# Patient Record
Sex: MALE | Race: WHITE | HISPANIC OR LATINO | URBAN - METROPOLITAN AREA
[De-identification: names, ages, dates, MRNs, and addresses within clinical notes are randomized per-mention and may not be internally consistent; named-entity substitution may affect disease eponyms.]

---

## 2023-06-10 ENCOUNTER — OFFICE VISIT (OUTPATIENT)
Dept: FAMILY MEDICINE | Facility: CLINIC | Age: 18
End: 2023-06-10
Payer: MEDICAID

## 2023-06-10 VITALS
HEART RATE: 88 BPM | RESPIRATION RATE: 16 BRPM | OXYGEN SATURATION: 98 % | TEMPERATURE: 99.8 F | DIASTOLIC BLOOD PRESSURE: 82 MMHG | SYSTOLIC BLOOD PRESSURE: 140 MMHG

## 2023-06-10 DIAGNOSIS — Z90.5 HISTORY OF NEPHRECTOMY: Primary | ICD-10-CM

## 2023-06-10 DIAGNOSIS — R21 RASH OF FACE: ICD-10-CM

## 2023-06-10 LAB
ALBUMIN SERPL BCG-MCNC: 4.5 G/DL (ref 3.5–5.2)
ALP SERPL-CCNC: 83 U/L (ref 40–129)
ALT SERPL W P-5'-P-CCNC: 22 U/L (ref 10–50)
ANION GAP SERPL CALCULATED.3IONS-SCNC: 10 MMOL/L (ref 7–15)
AST SERPL W P-5'-P-CCNC: 28 U/L (ref 10–50)
BASOPHILS # BLD AUTO: 0 10E3/UL (ref 0–0.2)
BASOPHILS NFR BLD AUTO: 0 %
BILIRUB SERPL-MCNC: 0.2 MG/DL
BUN SERPL-MCNC: 13.4 MG/DL (ref 6–20)
CALCIUM SERPL-MCNC: 9.9 MG/DL (ref 8.6–10)
CHLORIDE SERPL-SCNC: 105 MMOL/L (ref 98–107)
CREAT SERPL-MCNC: 0.9 MG/DL (ref 0.67–1.17)
DEPRECATED HCO3 PLAS-SCNC: 27 MMOL/L (ref 22–29)
EOSINOPHIL # BLD AUTO: 0.1 10E3/UL (ref 0–0.7)
EOSINOPHIL NFR BLD AUTO: 1 %
ERYTHROCYTE [DISTWIDTH] IN BLOOD BY AUTOMATED COUNT: 12.1 % (ref 10–15)
GFR SERPL CREATININE-BSD FRML MDRD: >90 ML/MIN/1.73M2
GLUCOSE SERPL-MCNC: 89 MG/DL (ref 70–99)
HCT VFR BLD AUTO: 45.5 % (ref 40–53)
HGB BLD-MCNC: 15.4 G/DL (ref 13.3–17.7)
IMM GRANULOCYTES # BLD: 0 10E3/UL
IMM GRANULOCYTES NFR BLD: 0 %
LYMPHOCYTES # BLD AUTO: 2.4 10E3/UL (ref 0.8–5.3)
LYMPHOCYTES NFR BLD AUTO: 20 %
MCH RBC QN AUTO: 27.9 PG (ref 26.5–33)
MCHC RBC AUTO-ENTMCNC: 33.8 G/DL (ref 31.5–36.5)
MCV RBC AUTO: 83 FL (ref 78–100)
MONOCYTES # BLD AUTO: 1 10E3/UL (ref 0–1.3)
MONOCYTES NFR BLD AUTO: 8 %
NEUTROPHILS # BLD AUTO: 8.7 10E3/UL (ref 1.6–8.3)
NEUTROPHILS NFR BLD AUTO: 71 %
PLATELET # BLD AUTO: 434 10E3/UL (ref 150–450)
POTASSIUM SERPL-SCNC: 4.6 MMOL/L (ref 3.4–5.3)
PROT SERPL-MCNC: 8.1 G/DL (ref 6.4–8.3)
RBC # BLD AUTO: 5.51 10E6/UL (ref 4.4–5.9)
SODIUM SERPL-SCNC: 142 MMOL/L (ref 136–145)
WBC # BLD AUTO: 12.2 10E3/UL (ref 4–11)

## 2023-06-10 PROCEDURE — 99203 OFFICE O/P NEW LOW 30 MIN: CPT | Performed by: FAMILY MEDICINE

## 2023-06-10 PROCEDURE — 36415 COLL VENOUS BLD VENIPUNCTURE: CPT | Performed by: FAMILY MEDICINE

## 2023-06-10 PROCEDURE — 80053 COMPREHEN METABOLIC PANEL: CPT | Performed by: FAMILY MEDICINE

## 2023-06-10 PROCEDURE — 85025 COMPLETE CBC W/AUTO DIFF WBC: CPT | Performed by: FAMILY MEDICINE

## 2023-06-10 RX ORDER — DOXYCYCLINE 50 MG/1
50 CAPSULE ORAL 2 TIMES DAILY
Qty: 28 CAPSULE | Refills: 0 | Status: SHIPPED | OUTPATIENT
Start: 2023-06-10 | End: 2023-07-09

## 2023-06-10 RX ORDER — DOXYCYCLINE 50 MG/1
50 CAPSULE ORAL 2 TIMES DAILY
Qty: 28 CAPSULE | Refills: 0 | Status: SHIPPED | OUTPATIENT
Start: 2023-06-10 | End: 2023-06-10

## 2023-06-10 NOTE — PROGRESS NOTES
Assessment & Plan     Rash of face  Contact/irritant dermatitis vs eczema vs periorificial dermatitis. Favor periorificial dermatitis. Dermatology referral placed. While waiting for appt, recommend trial of treatment. 'Zero therapy' discussed. Stop using 1% hydrocortisone. Start metronidazole. I suspect he will not tolerate topical therapy, since the skin is so inflamed and burns with lotion application, so he was also prescribed doxycycline.   - Adult Dermatology Referral  - metroNIDAZOLE (METROCREAM) 0.75 % external cream  Dispense: 45 g; Refill: 0  - doxycycline monohydrate (MONODOX) 50 MG capsule  Dispense: 28 capsule; Refill: 0    History of nephrectomy  Elevated BP  New to [place], just got insurance coverage. Baseline labs drawn today, as he has not seen a provider in years. Recheck of BP improved but still borderline. Recommend he establish care asap w PCP.  WBC mildly elevated, but appears well with no signs/symptoms of infection. Consider recheck when he established care w PCP. CMP pending at completion of visit, will notify of results when available.   - Comprehensive metabolic panel (BMP + Alb, Alk Phos, ALT, AST, Total. Bili, TP)  - CBC with platelets and differential                 No follow-ups on file.    Vivian Gonzalez MD  [facility]   Quan is a 17 year old, presenting for the following health issues:  Facial Swelling (Started 3 months ago when he moved to [place] from [place]. Left eye is worse. Left eye has been swollen shut. Ice pack and warm compresses helps relieve at time. Right eye does not get as bad. Is bothering vision. Using lotions. on left arm there is a spot that has been there same amount of time and has noticed around lips. Pain and itching and constant burning.  COntstant stuffy nose, )         View : No data to display.              HPI   Rash, dry skin around eyes, lips x 3 mos. Burns. Skin around eyes gets puffy. Does not recall anything  specific that started it    Has tried 4 types of OTC lotions for sensitive skin, everything burns/stings, makes it worse  Has tried 1% hydrocortisone    Inflammation comes and goes, can get so severe that eye swells shut    When eyes swell, lashes invert and scratch eye    Tries not to rub, but cannot help it sometimes    PMH: Had renal obstruction, kidney was removed about 5 years ago. Previously on lisinopril for HTN, but stopped after nephrectomy, states he no longer needed it. Has not seen a kidney doctor in at least a few years. No PCP.     Just got insurance coverage, and is getting set up w nephrologist here              Review of Systems         Objective    BP (!) 140/82   Pulse 88   Temp 99.8  F (37.7  C) (Tympanic)   Resp 16   SpO2 98%   There is no height or weight on file to calculate BMI.  Physical Exam   General: Pleasant well-appearing no acute distress  Skin: skin around eyes is erythematous, lichenified, and slightly swollen. Skin around lips is erythematous, with scattered tiny erythematous papules. On left inner upper arm, lichenified erythematous plaque approx 2-3cm, well demarcated

## 2023-06-11 NOTE — PATIENT INSTRUCTIONS
Schedule follow up wit a primary care doctor for your BP, kidney, and general health    Schedule with dermatology (skin specialist) for your rash.  In the meantime you can start doxycycline 50 mg twice daily, I have prescribed 2 weeks worth.  You should get a call from dermatology within 2 business days to schedule.  In the meantime, do not use any products other than plain vaseline

## 2023-06-12 ENCOUNTER — TELEPHONE (OUTPATIENT)
Dept: OTHER | Age: 18
End: 2023-06-12

## 2023-06-12 NOTE — TELEPHONE ENCOUNTER
This encounter is being sent to inform the clinic that this patient has a referral from Vivian Gonzalez MD for the diagnoses of Rash of face and has requested that this patient be seen within Priority: 1-2 Weeks. Based on the availability of our provider(s), we are unable to accommodate this request.      Were all sites offered this patient?  Yes      Does scheduling algorithm request to schedule next available?  Patient has been scheduled for the first available opening with Jake Lundberg MD on 12/21/2023.  We have informed the patient that the clinic will review their referral and reach out if a sooner appointment is medically necessary.     Verbal consent from patient that can speak with sister/Kelly if he is unavailable.

## 2023-06-15 ENCOUNTER — OFFICE VISIT (OUTPATIENT)
Dept: DERMATOLOGY | Facility: CLINIC | Age: 18
End: 2023-06-15
Attending: FAMILY MEDICINE
Payer: MEDICAID

## 2023-06-15 DIAGNOSIS — R21 RASH OF FACE: ICD-10-CM

## 2023-06-15 DIAGNOSIS — H01.119 CONTACT DERMATITIS OF EYELID, UNSPECIFIED LATERALITY: Primary | ICD-10-CM

## 2023-06-15 PROCEDURE — 99204 OFFICE O/P NEW MOD 45 MIN: CPT | Mod: GC | Performed by: DERMATOLOGY

## 2023-06-15 RX ORDER — DESONIDE 0.5 MG/G
OINTMENT TOPICAL 2 TIMES DAILY
Qty: 60 G | Refills: 1 | Status: SHIPPED | OUTPATIENT
Start: 2023-06-15 | End: 2023-09-26

## 2023-06-15 RX ORDER — TACROLIMUS 1 MG/G
OINTMENT TOPICAL 2 TIMES DAILY
Qty: 60 G | Refills: 1 | Status: SHIPPED | OUTPATIENT
Start: 2023-06-15 | End: 2023-09-26

## 2023-06-15 ASSESSMENT — PAIN SCALES - GENERAL: PAINLEVEL: MODERATE PAIN (4)

## 2023-06-15 NOTE — LETTER
6/15/2023       RE: Quan Davidson  [address]  [place] [address]     Dear Colleague,    Thank you for referring your patient, Quan Davidson, to the [facility] DERMAT[facility] [place] [facility]. Please see a copy of my visit note below.    [facility] Dermatology Note  Encounter Date: Shiva 15, 2023  Office Visit     Dermatology Problem List:  # Eyelid dermatitis, suspected ACD  - desonide ointment, protopic ointment, gentle skin cares  - allergy referral placed 06/15/23     ____________________________________________    Assessment & Plan:   # Eyelid dermatitis, suspect ACD  # Mild flexural eczema, keratosis pilaris, and rhinitis likely atopic predisposition   Physical exam as below likely consistent with a allergic contact dermatitis given eyelid involvement.  Likely has a background  of atopic dermatitis given other concurrent findings and symptoms.  Etiology natural history of this condition was discussed with the patient as well as treatment options and the rationale for use.  We will start topical therapies including desonide ointment twice daily for the next 2 weeks.  Side effects of steroids discussed including risk of glaucoma and cataracts with prolonged use.  We will then transition to topical tacrolimus thereafter with repeat use of desonide as needed.  Additionally allergy referral was placed for further evaluation and work-up.   - desonide (DESOWEN) 0.05 % external ointment; Apply topically 2 times daily  Dispense: 60 g; Refill: 1  - tacrolimus (PROTOPIC) 0.1 % external ointment; Apply topically 2 times daily  Dispense: 60 g; Refill: 1  - stop doxycycline, stop metronidazole  - Adult Dermatology Referral; Future    Procedures Performed:   None    Follow-up: 2 months, prn for new or changing lesions    Staff and Resident: Shakila Perez MD  PGY-3 Dermatology  Pager: [phone]    I have  personally examined this patient and agree with the resident doctor's documentation and plan of care. I have reviewed and amended the resident's note above. The documentation accurately reflects my clinical observations, diagnoses, treatment and follow-up plans.     Lashawn Jhaveri MD  Dermatology Staff  ____________________________________________    CC: Rash (Patient reports rash on face causes severe swelling. Patient states rash on L arm also swells.)    HPI:  Mr. Quan Davidson is a(n) 17 year old male who presents today as a new patient for rash.    Patient reports approximately 3 months ago he started having intermittent rash around his eyes to the point that they would swell and close chart.  Has happened approximately 4-5 times and severe episodes.  Does note a little bit of runny nose.  Has sometimes has a rash on his left flexural elbow.  Recently moved here from [place].  Wonders if related to sunlight as worsens with sunlight exposure.  Tries to wear sunscreen on a regular basis.  Currently unemployed.  Washes hands with Dove soap.  Uses [employer] Spring soap for body wash and dress and may for shampoo.  Was prescribed doxycycline as well as metronidazole cream for suspected periorificial dermatitis however is not using those as did not find any layer of them helpful.  Currently just using Carmex topically.  No other concerns at this time  - Patient is otherwise feeling well, without additional skin concerns.    Labs Reviewed:  N/A    Physical Exam:  Vitals: There were no vitals taken for this visit.  SKIN: Waist-up skin, which includes the head/face, neck, both arms, chest, back, abdomen, digits and/or nails was examined.  -Lichenified and eczematous papules and plaques on the upper and lower eyelids extending onto the cheeks.  Increase hyper linearity of the lower eyelid folds.  Few eczematous papules and plaques on the left flexural elbow with a larger nummular lichenified hyperpigmented plaque.   Tiny hard  papules on the posterior arms.   No other lesions of concern on areas examined.             Medications:  Current Outpatient Medications   Medication    doxycycline monohydrate (MONODOX) 50 MG capsule    metroNIDAZOLE (METROCREAM) 0.75 % external cream     No current facility-administered medications for this visit.      Past Medical History:   There is no problem list on file for this patient.    No past medical history on file.    CC Vivian Gonzalez MD  [address] [place] [address] on close of this encounter.    Rub eyes  3 months  405 times  Worsens with the sun  Not using metrondaziole  Eczema on the arms   Hx of sensitive skin to sun  - no eczema  - no seasonal allergies  - no asthma  Little brother with eczema  - shower day  - shampoo uses tressme  - soap, [place] spring  No rhass elsewhere     Thinks related to the sun    V

## 2023-06-15 NOTE — NURSING NOTE
Dermatology Rooming Note    Quan Davidson's goals for this visit include:   Chief Complaint   Patient presents with     Rash     Patient reports rash on face causes severe swelling. Patient states rash on L arm also swells.     Adilene Lopez

## 2023-06-15 NOTE — PROGRESS NOTES
Rub eyes  3 months  405 times  Worsens with the sun  Not using metrondaziole  Eczema on the arms   Hx of sensitive skin to sun  - no eczema  - no seasonal allergies  - no asthma  Little brother with eczema  - shower day  - shampoo uses tressme  - soap, mar[employer] spring  No rhass elsewhere     Thinks related to the sun    V

## 2023-06-15 NOTE — PATIENT INSTRUCTIONS
- Start desonide ointment twice a day for 2 weeks on the eyelids and face  - Then start topical tacrolimus twice a day as needed  - Allergy referral placed        Gentle Skin Care    Below is a list of products our providers recommend for gentle skin care.  Moisturizers:  Lighter; Exederm Intensive Moisture Cream, Cetaphil Cream, CeraVe, Aveeno Positively radiant and Vanicream Light   Thicker; Aquaphor Ointment, Vaseline, Petroleum Jelly, Eucerin Original Healing Cream and Vanicream, CeraVe Healing Ointment, Aquaphor Body Spray  Avoid Lotions (too thin)  Mild Cleansers:  Dove- Fragrance Free bar or wash  CeraVe   Vanicream Cleansing bar  Cetaphil Cleanser   Aquaphor 2 in1 Gentle Wash and Shampoo  Dove Baby wash  Exederm Body wash       Laundry Products:    All Free and Clear  Cheer Free  Generic Brands are okay as long as they are  Fragrance Free    Avoid fabric softeners  and dryer sheets   Sunscreens: SPF 30 or greater     Sunscreens that contain Zinc Oxide and/or Titanium Dioxide should be applied, these are physical blockers. One or both of these should be listed in the  Active Ingredients   Any other listed ingredients under the active ingredients would be a chemically based sunscreen which might be irritating.  Spray sunscreens should be avoided because these are typically chemical sunscreens.      Shampoo and Conditioners:  Free and Clear by Vanicream  Aquaphor 2 in 1 Gentle Wash and Shampoo   Oils:  Mineral Oil   Emu Oil   For some patients: Coconut (raw, unrefined, organic) and Sunflower seed oil              Generic Products are an okay substitute, but make sure they are fragrance free.  *Reading the product ingredients list is very important  *Avoid product that have fragrance added to them.   *Organic does not mean  fragrance free.  In fact patients with sensitive skin can become quite irritated by some organic products.     Daily bathing is recommended. Make sure you are applying a good moisturizer  after bathing every time.  Use Moisturizing creams at least twice daily to the whole body. Your provider may recommend a lighter or heavier moisturizer based on your child s severity and that time of year it is.  Creams are more moisturizing than lotions.       Care Plan:  Keep bathing and showering short, less than 15 minutes   Always use lukewarm warm when possible. AVOID HOT or COLD water  DO NOT use bubble bath  Limit the use of soaps. Focus on the skin folds, face, armpits, groin and feet towards the end of the bath  Do NOT vigorously scrub when you cleanse the skin  After bathing, PAT your skin lightly with a towel. DO NOT rub or scrub when drying  ALWAYS apply a moisturizer immediately after bathing. This helps to  lock in  the moisture. * IF YOU WERE PRESCRIBED A TOPICAL MEDICATION, APPLY YOUR MEDICATION FIRST THEN COVER WITH YOUR DAILY MOISTURIZER  Reapply moisturizing agents at least twice daily to your whole body    Other helpful tips:  Do not use products such as powders, perfumes, or colognes on your skin  Diffusers can be harsh on sensitive skin, use with caution if you or your child has sensitive skin   Avoid saunas and steam baths. This temperature is too HOT  Avoid tight or  scratchy  clothing such as wool  Always wash new clothing before wearing them for the first time  Sometimes a humidifier or vaporizer can be used at night can help the dry skin. Remember to keep these items clean to avoid mold growth.

## 2023-06-15 NOTE — PROGRESS NOTES
[facility] Dermatology Note  Encounter Date: Shiva 15, 2023  Office Visit     Dermatology Problem List:  # Eyelid dermatitis, suspected ACD  - desonide ointment, protopic ointment, gentle skin cares  - allergy referral placed 06/15/23     ____________________________________________    Assessment & Plan:   # Eyelid dermatitis, suspect ACD  # Mild flexural eczema, keratosis pilaris, and rhinitis likely atopic predisposition   Physical exam as below likely consistent with a allergic contact dermatitis given eyelid involvement.  Likely has a background  of atopic dermatitis given other concurrent findings and symptoms.  Etiology natural history of this condition was discussed with the patient as well as treatment options and the rationale for use.  We will start topical therapies including desonide ointment twice daily for the next 2 weeks.  Side effects of steroids discussed including risk of glaucoma and cataracts with prolonged use.  We will then transition to topical tacrolimus thereafter with repeat use of desonide as needed.  Additionally allergy referral was placed for further evaluation and work-up.   - desonide (DESOWEN) 0.05 % external ointment; Apply topically 2 times daily  Dispense: 60 g; Refill: 1  - tacrolimus (PROTOPIC) 0.1 % external ointment; Apply topically 2 times daily  Dispense: 60 g; Refill: 1  - stop doxycycline, stop metronidazole  - Adult Dermatology Referral; Future    Procedures Performed:   None    Follow-up: 2 months, prn for new or changing lesions    Staff and Resident: Shakila Perez MD  PGY-3 Dermatology  Pager: [phone]    I have personally examined this patient and agree with the resident doctor's documentation and plan of care. I have reviewed and amended the resident's note above. The documentation accurately reflects my clinical observations, diagnoses, treatment and follow-up plans.     Lashawn Jhaveri MD  Dermatology  Staff  ____________________________________________    CC: Rash (Patient reports rash on face causes severe swelling. Patient states rash on L arm also swells.)    HPI:  Mr. Quan Davidson is a(n) 17 year old male who presents today as a new patient for rash.    Patient reports approximately 3 months ago he started having intermittent rash around his eyes to the point that they would swell and close chart.  Has happened approximately 4-5 times and severe episodes.  Does note a little bit of runny nose.  Has sometimes has a rash on his left flexural elbow.  Recently moved here from [place].  Wonders if related to sunlight as worsens with sunlight exposure.  Tries to wear sunscreen on a regular basis.  Currently unemployed.  Washes hands with Dove soap.  Uses [employer] Spring soap for body wash and dress and may for shampoo.  Was prescribed doxycycline as well as metronidazole cream for suspected periorificial dermatitis however is not using those as did not find any layer of them helpful.  Currently just using Carmex topically.  No other concerns at this time  - Patient is otherwise feeling well, without additional skin concerns.    Labs Reviewed:  N/A    Physical Exam:  Vitals: There were no vitals taken for this visit.  SKIN: Waist-up skin, which includes the head/face, neck, both arms, chest, back, abdomen, digits and/or nails was examined.  -Lichenified and eczematous papules and plaques on the upper and lower eyelids extending onto the cheeks.  Increase hyper linearity of the lower eyelid folds.  Few eczematous papules and plaques on the left flexural elbow with a larger nummular lichenified hyperpigmented plaque.  Tiny hard  papules on the posterior arms.   No other lesions of concern on areas examined.             Medications:  Current Outpatient Medications   Medication     doxycycline monohydrate (MONODOX) 50 MG capsule     metroNIDAZOLE (METROCREAM) 0.75 % external cream     No current  facility-administered medications for this visit.      Past Medical History:   There is no problem list on file for this patient.    No past medical history on file.    CC Vivian Gonzalez MD  [address] [place] [address] on close of this encounter.

## 2023-06-26 ENCOUNTER — OFFICE VISIT (OUTPATIENT)
Dept: FAMILY MEDICINE | Facility: CLINIC | Age: 18
End: 2023-06-26
Payer: MEDICAID

## 2023-06-26 VITALS
HEIGHT: 63 IN | DIASTOLIC BLOOD PRESSURE: 96 MMHG | HEART RATE: 102 BPM | OXYGEN SATURATION: 98 % | BODY MASS INDEX: 55.81 KG/M2 | RESPIRATION RATE: 14 BRPM | TEMPERATURE: 98.6 F | WEIGHT: 315 LBS | SYSTOLIC BLOOD PRESSURE: 146 MMHG

## 2023-06-26 DIAGNOSIS — M25.561 CHRONIC PAIN OF RIGHT KNEE: Primary | ICD-10-CM

## 2023-06-26 DIAGNOSIS — H53.9 VISION CHANGES: ICD-10-CM

## 2023-06-26 DIAGNOSIS — G89.29 CHRONIC PAIN OF RIGHT KNEE: Primary | ICD-10-CM

## 2023-06-26 DIAGNOSIS — E66.01 MORBID OBESITY (H): ICD-10-CM

## 2023-06-26 DIAGNOSIS — Z90.5 S/P NEPHRECTOMY: ICD-10-CM

## 2023-06-26 PROCEDURE — 99214 OFFICE O/P EST MOD 30 MIN: CPT | Performed by: FAMILY MEDICINE

## 2023-06-26 RX ORDER — DOXYCYCLINE 50 MG/1
1 TABLET ORAL
COMMUNITY
Start: 2023-06-10 | End: 2023-09-26

## 2023-06-26 NOTE — PATIENT INSTRUCTIONS
Give me the day to write an exercise prescription for you!  It was nice to meet you. Meet with the orthopedic specialists for the knee pain.  Meet with the weight center for the weight management.  We will repeat a blood pressure after this, but meet with the kidney doctors.

## 2023-06-26 NOTE — PROGRESS NOTES
"  Assessment & Plan     Chronic pain of right knee  Chronic, stable. Patient would like to follow up with orthopedic surgeon. Referral placed.   - Orthopedic  Referral    Vision changes  Chronic, likely age related myopia. Referral for optometrist.   - REVIEW OF HEALTH MAINTENANCE PROTOCOL ORDERS  - Adult Eye  Referral  - Primary Care - Care Coordination Referral    S/p nephrectomy  Chronic, stable. Denies any hematuria. BP elevated today. Would want to be cautious given solitary kidney. Referral placed.   - Adult Nephrology  Referral    Morbid obesity (H)  Chronic, patient would like to consider medication versus surgery.   - Adult Comprehensive Weight Management  Referral      Ordering of each unique test  I spent a total of 29 minutes on the day of the visit.   Time spent by me doing chart review, history and exam, documentation and further activities per the note       BMI:   Estimated body mass index is 57.71 kg/m  as calculated from the following:    Height as of this encounter: 1.607 m (5' 3.25\").    Weight as of this encounter: 149 kg (328 lb 6.4 oz).   Weight management plan: Patient referred to endocrine and/or weight management specialty Discussed healthy diet and exercise guidelines    See Patient Instructions    DO EUGENIO Gilliam [facility] AMIRA Glass is a 18 year old, presenting for the following health issues:  Establish Care        6/26/2023    10:32 AM   Additional Questions   Roomed by Courtney WILLIS CMA   Accompanied by sister     History of Present Illness       Reason for visit:  To start primary care    He eats 0-1 servings of fruits and vegetables daily.He consumes 0 sweetened beverage(s) daily.He exercises with enough effort to increase his heart rate 10 to 19 minutes per day.  He exercises with enough effort to increase his heart rate 4 days per week.   He is taking medications regularly.     Patient would like a new primary. " "He is s/p nephrectomy due to a congenital kidney pathology?    Knee Injury  -Traumatic fall to the knee when in flexion  -Patient experiences knee giving out frequently  -Patient is seeing a chiropractor    Patient has a target weight of 170-180 lb  Lowest weight was 199 lb  Patient used to be on lisinopril.     Review of Systems   Constitutional, HEENT, cardiovascular, pulmonary, gi and gu systems are negative, except as otherwise noted.      Objective    BP (!) 144/82 (BP Location: Left arm, Patient Position: Sitting, Cuff Size: Adult Large)   Pulse 102   Temp 98.6  F (37  C) (Oral)   Resp 14   Ht 1.607 m (5' 3.25\")   Wt 149 kg (328 lb 6.4 oz)   SpO2 98%   BMI 57.71 kg/m    Body mass index is 57.71 kg/m .  Physical Exam   GENERAL: healthy, alert and no distress  EYES: Eyes grossly normal to inspection, PERRL and conjunctivae and sclerae normal  NECK: no adenopathy, no asymmetry, masses, or scars and thyroid normal to palpation  MS: no gross musculoskeletal defects noted, no edema  SKIN: no suspicious lesions or rashes  PSYCH: mentation appears normal, affect normal/bright                "

## 2023-06-26 NOTE — LETTER
June 26, 2023      Quan FLORA Davidson  [address]  [place] [address]        {Comm Man dear:[id number]}          Sincerely,        Nidhi Louis, DO

## 2023-06-27 ENCOUNTER — TELEPHONE (OUTPATIENT)
Dept: NEPHROLOGY | Facility: CLINIC | Age: 18
End: 2023-06-27

## 2023-06-27 ENCOUNTER — PATIENT OUTREACH (OUTPATIENT)
Dept: CARE COORDINATION | Facility: CLINIC | Age: 18
End: 2023-06-27

## 2023-06-27 NOTE — PROGRESS NOTES
Clinic Care Coordination Contact  Community Health Worker Initial Outreach    CHW Initial Information Gathering:  Referral Source: PCP  Preferred Urgent Care: [facility] [place], [phone]  Current living arrangement:: I live in a private home with family  Type of residence:: Private home - stairs  Community Resources: [facility]  Informal Support system:: Family, Friends, Parent  No PCP office visit in Past Year: No  Transportation means:: Family, Friend, Regular car  CHW Additional Questions  If ED/Hospital discharge, follow-up appointment scheduled as recommended?: N/A  Medication changes made following ED/Hospital discharge?: N/A  MyChart active?: Yes  Patient sent Social Determinants of Health questionnaire?: Yes    Patient accepts CC: Yes. Patient scheduled for assessment with CC RN  on Wednesday 6/28/2023 at 3:00pm. Patient noted desire to discuss recent CC referral- patient requesting to establish care with new providers.     Order Questions    Question Answer   Reason for Referral: Other   My Clinical Question Is: Resource support- looking for establishing care with all new providers   Clinical Staff have discussed the Care Coordination Referral with the patient and/or caregiver: Yes         Siri EVANS  Community Health Worker  [facility] Care Coordination  [facility] [email or web address]  Office: [phone]

## 2023-06-27 NOTE — TELEPHONE ENCOUNTER
Labs will be placed closer to appointment  
[facility] Call Center    Phone Message    May a detailed message be left on voicemail: yes     Reason for Call: Order(s): Other:   Reason for requested: Labs  Date needed: 8/30/23  Provider name: Stacey      Action Taken: Other: Neph    Travel Screening: Not Applicable                                                                      
abd

## 2023-06-28 ENCOUNTER — PATIENT OUTREACH (OUTPATIENT)
Dept: NURSING | Facility: CLINIC | Age: 18
End: 2023-06-28

## 2023-06-28 ASSESSMENT — ACTIVITIES OF DAILY LIVING (ADL): DEPENDENT_IADLS:: INDEPENDENT
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
## 2023-06-28 NOTE — CONFIDENTIAL NOTE
DIAGNOSIS:  S/p nephrectomy   DATE RECEIVED: 09.06.2023   NOTES STATUS DETAILS   OFFICE NOTE from referring provider Internal 06/28/2023 Nidhi Louis, DO   OFFICE NOTE from other specialist  Internal 06.10.2023 Vivian Gonzalez MD   *Only VASCULITIS or LUPUS gather office notes for the following     *PULMONARY       *ENT     *DERMATOLOGY     *RHEUMATOLOGY     DISCHARGE SUMMARY from hospital     DISCHARGE REPORT from the ER     MEDICATION LIST Internal    IMAGING  (NEED IMAGES AND REPORTS)     KIDNEY CT SCAN     KIDNEY ULTRASOUND     MR ABDOMEN     NUCLEAR MEDICINE RENAL     LABS     CBC Internal 06.10.2023   CMP Internal 06.10.2023   BMP     UA     URINE PROTEIN     RENAL PANEL     BIOPSY     KIDNEY BIOPSY

## 2023-06-28 NOTE — LETTER
[facility] CARE COORDINATION  [facility]    July 9, 2023    Quan HINES Joey Mendezra  [address]  [place] [address]      Dear Quan,      I am a  clinic care coordinator who works with Nidhi Louis DO with the [facility]. I wanted to thank you for spending the time to talk with me.  Below is a description of clinic care coordination and how I can further assist you.       The clinic care coordination team is made up of a registered nurse, , financial resource worker and community health worker who understand the health care system. The goal of clinic care coordination is to help you manage your health and improve access to the health care system. Our team works alongside your provider to assist you in determining your health and social needs. We can help you obtain health care and community resources, providing you with necessary information and education. We can work with you through any barriers and develop a care plan that helps coordinate and strengthen the communication between you and your care team.  Our services are voluntary and are offered without charge to you personally.    Please feel free to contact me with any questions or concerns regarding care coordination and what we can offer.      We are focused on providing you with the highest-quality healthcare experience possible.    Sincerely,     David Myhre, RN  CCC RN    Enclosed: I have enclosed a copy of the Patient Centered Plan of Care. This has helpful information and goals that we have talked about. Please keep this in an easy to access place to use as needed.

## 2023-06-28 NOTE — LETTER
[facility]  Patient Centered Plan of Care  About Me:        Patient Name:  Quan Davidson    YOB: 2005  Age:         18 year old   Thai MRN:    [id number] Telephone Information:  Home Phone [phone]   Mobile [phone]   Home Phone [phone]   Home Phone [phone]       Address:  [address] [place] [address] Email address:  [email or web address]      Emergency Contact(s)    Name Relationship Lgl Grd Work Phone Home Phone Mobile Phone   1. JASON JACKSON Father   [phone]    2. CAROLYN JACKSON Mother   [phone]    3. SEPIDEH JACKSON Father   [phone]    4. WILMAR JOHNS Sister    [phone]           Primary language:  English     needed? No   [employer]:  [phone] op. 1  Other communication barriers:None    Preferred Method of Communication:     Current living arrangement: I live in a private home with family    Mobility Status/ Medical Equipment: Independent        Health Maintenance  Health Maintenance Reviewed: Due/Overdue   Health Maintenance Due   Topic Date Due    YEARLY PREVENTIVE VISIT  Never done    ADVANCE CARE PLANNING  Never done    HEPATITIS B IMMUNIZATION (1 of 3 - 3-dose series) Never done    COVID-19 Vaccine (1) Never done    DTAP/TDAP/TD IMMUNIZATION (1 - Tdap) Never done    HPV IMMUNIZATION (1 - Risk male 3-dose series) Never done    HIV SCREENING  Never done    HEPATITIS C SCREENING  Never done          My Access Plan  Medical Emergency 911   Primary Clinic Line  - [phone]   24 Hour Appointment Line [phone] or  [phone]) (toll-free)   24 Hour Nurse Line [phone] (toll-free)   Preferred Urgent Care [facility] [phone]     [facility]  [phone]     Preferred Pharmacy [facility] [address] [facility] [address] [place] [address]     Behavioral Health Crisis Line The National Suicide Prevention  [facility] at 1-775.663.6848 or Text/Call 988             My Care Team Members  Patient Care Team         Relationship Specialty Notifications Start End    Nidhi Louis DO PCP - General Family Medicine  7/9/23     Phone: [phone] Fax: [phone]         [address] [facility] [address]    Edyta Hawkins, NP Nurse Practitioner Nurse Practitioner  3/3/15     ref to nephro    Phone: [phone] Fax: [phone]         [facility] [address] [address]    Piil Hernandez MD MD Pediatric Nephrology  3/3/15     Phone: [phone] Fax: [phone]         [address] [address]    Edyta Hawkins NP  Nurse Practitioner  6/10/23     Merged    Phone: [phone] Fax: [phone]         [facility] [address] [facility] [address]    Vivian Gonzalez MD MD Family Medicine  6/12/23     Referring to DERM    Phone: [phone] Fax: [phone]         [address] [facility] [address] [facility] [address]    Jake Lundberg MD MD Dermapathology  6/12/23     Phone: [phone] Fax: [phone]         [address] [address]    Bebeto Vinson MD MD Allergy & Immunology  6/15/23     Phone: [phone] Fax: [phone]         [address] [place] [address]    Lashawn Jhaveri MD MD Dermatology  6/15/23     referring to Allergy    Phone: [phone] Fax: [phone]         [address] [place] [address] [address] [place] [address]    Enrrique Ibarra MD MD Nephrology  6/27/23     Phone: [phone] Fax: [phone]         [address] [place] [address]              My Care Plans  Self Management and Treatment Plan  Care Plan  Care Plan: General       Problem: HP GENERAL PROBLEM       Long-Range Goal: I would like to work with the Bariatric Clinic to assist me with weight loss.       Start Date: 6/28/2023 Expected End Date: 9/28/2023    This Visit's Progress: 10%    Priority: High    Note:     Barriers: elevated  BMI  Strengths: strong advocate for himself.  Patient expressed understanding of goal: yes  Action steps to achieve this goal:  1. I understand Dr. Louis has sent  referral to the Bariatric Clinic on my behalf.   2. I understand a representative from the Bariatric Clinic will contact me directly to scheduled an intake appointment.   3. I will report progress towards this goal at scheduled outreach telephone calls from my Care Coordination team.                                 Action Plans on File:                       Advance Care Plans/Directives Type:   No data recorded    My Medical and Care Information  Problem List   Patient Active Problem List   Diagnosis    Elevated blood pressure    UPJ (ureteropelvic junction) obstruction      Current Medications and Allergies:  See printed Medication Report.    Care Coordination Start Date: 6/26/2023   Frequency of Care Coordination: monthly     Form Last Updated: 07/09/2023

## 2023-07-09 ENCOUNTER — HEALTH MAINTENANCE LETTER (OUTPATIENT)
Age: 18
End: 2023-07-09
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
## 2023-07-26 ENCOUNTER — OFFICE VISIT (OUTPATIENT)
Dept: OPHTHALMOLOGY | Facility: CLINIC | Age: 18
End: 2023-07-26
Attending: OPTOMETRIST
Payer: MEDICAID

## 2023-07-26 DIAGNOSIS — H40.053 BORDERLINE GLAUCOMA OF BOTH EYES WITH OCULAR HYPERTENSION: ICD-10-CM

## 2023-07-26 DIAGNOSIS — H04.123 DRY EYE SYNDROME OF BOTH EYES: ICD-10-CM

## 2023-07-26 DIAGNOSIS — H52.13 HIGH MYOPIA, BILATERAL: ICD-10-CM

## 2023-07-26 DIAGNOSIS — H35.9 WHITE WITHOUT PRESSURE OF PERIPHERAL RETINA OF BOTH EYES: ICD-10-CM

## 2023-07-26 DIAGNOSIS — H52.203 HIGH DEGREE OF ASTIGMATISM IN BOTH EYES: Primary | ICD-10-CM

## 2023-07-26 PROCEDURE — 92004 COMPRE OPH EXAM NEW PT 1/>: CPT | Performed by: OPTOMETRIST

## 2023-07-26 PROCEDURE — G0463 HOSPITAL OUTPT CLINIC VISIT: HCPCS | Performed by: OPTOMETRIST

## 2023-07-26 PROCEDURE — 92015 DETERMINE REFRACTIVE STATE: CPT

## 2023-07-26 ASSESSMENT — REFRACTION_WEARINGRX
OS_AXIS: 074
OS_CYLINDER: +6.25
OD_SPHERE: -7.25
OD_AXIS: 100
OD_CYLINDER: +5.75
OS_SPHERE: -7.25

## 2023-07-26 ASSESSMENT — REFRACTION_MANIFEST
OS_SPHERE: -7.25
OS_AXIS: 065
OD_AXIS: 105
OD_SPHERE: -7.50
OD_CYLINDER: +5.25
OS_CYLINDER: +6.00

## 2023-07-26 ASSESSMENT — SLIT LAMP EXAM - LIDS
COMMENTS: NORMAL
COMMENTS: NORMAL

## 2023-07-26 ASSESSMENT — CONF VISUAL FIELD
OD_SUPERIOR_TEMPORAL_RESTRICTION: 0
OS_SUPERIOR_TEMPORAL_RESTRICTION: 0
OD_NORMAL: 1
OS_NORMAL: 1
OD_INFERIOR_TEMPORAL_RESTRICTION: 0
OS_INFERIOR_TEMPORAL_RESTRICTION: 0
OD_SUPERIOR_NASAL_RESTRICTION: 0
OS_SUPERIOR_NASAL_RESTRICTION: 0
METHOD: COUNTING FINGERS
OD_INFERIOR_NASAL_RESTRICTION: 0
OS_INFERIOR_NASAL_RESTRICTION: 0

## 2023-07-26 ASSESSMENT — VISUAL ACUITY
OS_PH_CC+: -1
OS_CC+: -1
OD_CC+: -2
OD_CC: 20/25
OS_PH_CC: 20/25
METHOD: SNELLEN - LINEAR
OS_CC: 20/30

## 2023-07-26 ASSESSMENT — TONOMETRY
OD_IOP_MMHG: 27
IOP_METHOD: APPLANATION
OD_IOP_MMHG: 22
OD_IOP_MMHG: 24
OS_IOP_MMHG: 25
IOP_METHOD: APPLANATION
OS_IOP_MMHG: 24
IOP_METHOD: TONOPEN
OS_IOP_MMHG: 24

## 2023-07-26 ASSESSMENT — EXTERNAL EXAM - LEFT EYE: OS_EXAM: NORMAL

## 2023-07-26 ASSESSMENT — EXTERNAL EXAM - RIGHT EYE: OD_EXAM: NORMAL

## 2023-07-26 NOTE — NURSING NOTE
Chief Complaints and History of Present Illnesses   Patient presents with    Annual Eye Exam     Chief Complaint(s) and History of Present Illness(es)       Annual Eye Exam              Laterality: both eyes    Onset: gradual    Associated symptoms: tearing (LE) and itching (LE).  Negative for dryness, eye pain, flashes and floaters    Pain scale: 0/10              Comments    Quan is here new to clinic, referred by Dr Louis (Optometrist) for a vision changes. He wears glasses but they ar not working for him. He has a history of glaucoma and has not seen anyone for glaucoma for the past eight months.     Will Mixon COT 8:33 AM July 26, 2023

## 2023-08-02 ENCOUNTER — PATIENT OUTREACH (OUTPATIENT)
Dept: CARE COORDINATION | Facility: CLINIC | Age: 18
End: 2023-08-02
Payer: MEDICAID

## 2023-08-02 DIAGNOSIS — H40.053 BORDERLINE GLAUCOMA OF BOTH EYES WITH OCULAR HYPERTENSION: Primary | ICD-10-CM

## 2023-08-02 NOTE — PROGRESS NOTES
Clinic Care Coordination Contact  Follow Up Progress Note      Assessment: CCC RN spoke with patient today to follow up on goals and to discuss any needs or concerns. Patient stated she was able to get scheduled with the the Bariatric Clinic in October 2023. He stated in the meantime, he was been working losing weight through diet and exercise. He stated he has followed up with the Ophthalmologist and said she feels she is really going to help him with his vision concerns. Writer went over all upcoming appointments with patient. He denied any other needs or concerns at this time.     Care Gaps:    Health Maintenance Due   Topic Date Due    YEARLY PREVENTIVE VISIT  Never done    ADVANCE CARE PLANNING  Never done    HEPATITIS B IMMUNIZATION (1 of 3 - 3-dose series) Never done    COVID-19 Vaccine (1) Never done    DTAP/TDAP/TD IMMUNIZATION (1 - Tdap) Never done    HPV IMMUNIZATION (1 - Risk male 3-dose series) Never done    HIV SCREENING  Never done    HEPATITIS C SCREENING  Never done       Scheduled with PCP on 9/26/23 for preventative care visit.       Care Plans  Care Plan: General       Problem: HP GENERAL PROBLEM       Long-Range Goal: I would like to work with the Bariatric Clinic to assist me with weight loss.       Start Date: 6/28/2023 Expected End Date: 9/28/2023    Recent Progress: 10%    Priority: High    Note:     Barriers: elevated BMI  Strengths: strong advocate for himself.  Patient expressed understanding of goal: yes  Action steps to achieve this goal:  1. I understand Dr. Louis has sent  referral to the Bariatric Clinic on my behalf.   2. I understand a representative from the Bariatric Clinic will contact me directly to scheduled an intake appointment.   3. I will report progress towards this goal at scheduled outreach telephone calls from my Care Coordination team.      Discussed on 8/2/23                              Intervention/Education provided during outreach: Encouraged patient to continue  to work on his goal. Encouraged him to attend all scheduled appointments. Encouraged him to contact Care Coordination for any other needs or concerns.      Outreach Frequency: monthly        Plan: CCC RN will continue to monitor, support patient with current goals and will be available to assist as nursing needs arise. CCC CHW will reach out to patient on a monthly basis to discuss progression of his goal.     Care Coordinator will perform Chart Review in 45 days.

## 2023-08-31 DIAGNOSIS — R79.89 ELEVATED SERUM CREATININE: Primary | ICD-10-CM

## 2023-09-01 ENCOUNTER — PATIENT OUTREACH (OUTPATIENT)
Dept: CARE COORDINATION | Facility: CLINIC | Age: 18
End: 2023-09-01
Payer: COMMERCIAL

## 2023-09-05 NOTE — PROGRESS NOTES
Nephrology Clinic Visit  Quan Davidson MRN: [id number] YOB: 2005  Primary Care Provider: Nidhi Little  ----------------------------------------------------------------------------------------------------------------------  Visit 2023:  --BP Control: 143/73 today in office.   --Current Regimen: None (previously on Lisinopril - this was about 4 years ago)  -DM Control: Fasting glucose acceptable. Unlikely to have DM  --Current Regimen: None  -Creatinine Trend: 0.94 (2023) from 0.9 (6/10/2023)  -Nocturia:0x  -Hematuria: No  -Nephrolithiasis: No  -NSAID Use: Tylenol, very rare.   -Herbal/OTC Medication Use: no  -Family History of Kidney Disease: No  -Family/Friends on RRT/Kidney Transplant: No/No    -Other:  --History of Nephrectomy: Procedure went smoothly.   --History of Hypertension: Was treated for about 2.5 years with Lisnoril. Came off about 4 years ago.;   --History of Obesity: Referred to medical weight management clinic.  Also doing intermittent fasting and this has helped. Has lost about 5lbs.   --Last saw Nephrology about 4 years ago.   --He had COVID when he lived in [place]   --He is currently doing [employer] classes online. Interesting in [employer]    Objective:  PAST MEDICAL HISTORY:  Past Medical History:   Diagnosis Date    Glaucoma (increased eye pressure)     Term birth of male      Traumatic brain injury (H) 2014    UPJ obstruction, congenital 2014    identified on imaging for unrelated trauma       PAST SURGICAL HISTORY:  Past Surgical History:   Procedure Laterality Date    NO HISTORY OF SURGERY         MEDICATIONS:  Current Outpatient Medications   Medication Instructions    desonide (DESOWEN) 0.05 % external ointment Topical, 2 TIMES DAILY    doxycycline monohydrate (ADOXA) 50 MG tablet 1 tablet, Oral, 2 TIMES DAILY (Diuretics and Nitrates)    tacrolimus (PROTOPIC) 0.1 % external ointment Topical, 2 TIMES DAILY       FAMILY MEDICAL HISTORY:  "  Family History   Problem Relation Age of Onset    Diabetes Father     Hypertension Father        PHYSICAL EXAM:   BP (!) 143/73   Pulse 101   Ht 1.651 m (5' 5\")   Wt 143.9 kg (317 lb 4.8 oz)   BMI 52.80 kg/m    GENERAL APPEARANCE: alert and no distress  EYES: nonicteric  HENT: mouth without ulcers or lesions  RESP: lungs clear to auscultation   CV: regular rhythm, normal rate, no rub  ABDOMEN: soft, nontender, normal bowel sounds, no HSM   Extremities: No LE edema  MS: no evidence of inflammation in joints, no muscle tenderness  SKIN: no rash  NEURO: mentation intact and speech normal  PSYCH: affect normal    LABS REVIEWED BY ME:   ANEMIA  Recent Labs   Lab Test 06/10/23  1901   HGB 15.4       BMP  Recent Labs   Lab Test 06/10/23  1901      POTASSIUM 4.6   CHLORIDE 105   CO2 27   ANIONGAP 10   BUN 13.4   CR 0.90   GFRESTIMATED >90   PROTTOTAL 8.1       CBC  Recent Labs   Lab Test 06/10/23  1901   HGB 15.4   WBC 12.2*   HCT 45.5   MCV 83          DIABETESNo lab results found.    HYPONATREMIANo lab results found.    Invalid input(s): UOSM, OSM    MBD  Recent Labs   Lab Test 06/10/23  1901   JOSE ARMANDO 9.9   ALBUMIN 4.5        URINE STUDIES  No lab results found.  No lab results found.    ADDITIONAL LABS ORDERED/REVIEWED BY ME:  See below    Assessment/Plan  CKD Stage G1A1  Established care with me at [facility] 9/6/2023    History of UPJ Obstruction and s/p R Nephrectomy.(Due to poor function and pediatric HTN). Was previously on Lisinopril for about 2.5 years or so. Thinks he came off BP medications about 4 years prior to establishign care with me. His BP is elevated in office today. He does not have a home BP cuff.    Creatinine is stable at his baseline of 0.9. No hematuria or pyuria on his UA. UPCR is 0.11g/gr.     He has glucosuria. We are sending A1c    He has appt with medical weight loss clinic.     CKD Etiology (Biopsy Proven: No):  -Reduced Renal Mass (R Nephrectomy 2015    Plan:  -Optimize BP " Control: I have sent a DME order for BP cuff. He does nto currenlty have proteinuria. Follow up A1c and his BP trends (he will send me readings in 1-2 weeks). Pending his readings and his A1c results we will likely want to get him on an ACEi or ARB. I think we are okay to hold off on secondary workup for HTN at this point given his history of morbid obesity and his history of nephrectomy.   -Avoid NSAIDs  -Agree with referral to medical weight loss clinic. We briefly discussed some medication options to help with weight loss. Encouraged him to keep his appt with the medical weight loss folks.     Other:  -Specialty Care Coordination Referral - CKD G1-G3b w/o imminent RRT planning/needs (Yes/no, Date Referral Placed): No  -Med Therapy Management Referral (Yes/No, Date of Referral): No    Return to clinic: 6 months    Enrrique Ibarra MD   of Medicine  Division of Nephrology and Hypertension  [facility]    35 minutes spent on the date of the encounter doing chart review, history and exam, documentation and further activities as noted above

## 2023-09-06 ENCOUNTER — LAB (OUTPATIENT)
Dept: LAB | Facility: CLINIC | Age: 18
End: 2023-09-06
Payer: COMMERCIAL

## 2023-09-06 ENCOUNTER — OFFICE VISIT (OUTPATIENT)
Dept: NEPHROLOGY | Facility: CLINIC | Age: 18
End: 2023-09-06
Attending: FAMILY MEDICINE
Payer: COMMERCIAL

## 2023-09-06 ENCOUNTER — PRE VISIT (OUTPATIENT)
Dept: NEPHROLOGY | Facility: CLINIC | Age: 18
End: 2023-09-06

## 2023-09-06 VITALS
HEIGHT: 65 IN | DIASTOLIC BLOOD PRESSURE: 73 MMHG | BODY MASS INDEX: 52.48 KG/M2 | SYSTOLIC BLOOD PRESSURE: 143 MMHG | WEIGHT: 315 LBS | HEART RATE: 101 BPM

## 2023-09-06 DIAGNOSIS — Z90.5 S/P NEPHRECTOMY: ICD-10-CM

## 2023-09-06 DIAGNOSIS — R79.89 ELEVATED SERUM CREATININE: ICD-10-CM

## 2023-09-06 LAB
ALBUMIN MFR UR ELPH: 15.9 MG/DL
ALBUMIN SERPL BCG-MCNC: 4.4 G/DL (ref 3.5–5.2)
ALBUMIN UR-MCNC: 10 MG/DL
ANION GAP SERPL CALCULATED.3IONS-SCNC: 11 MMOL/L (ref 7–15)
APPEARANCE UR: CLEAR
BILIRUB UR QL STRIP: NEGATIVE
BUN SERPL-MCNC: 9.3 MG/DL (ref 6–20)
CALCIUM SERPL-MCNC: 9.6 MG/DL (ref 8.6–10)
CHLORIDE SERPL-SCNC: 104 MMOL/L (ref 98–107)
COLOR UR AUTO: ABNORMAL
CREAT SERPL-MCNC: 0.94 MG/DL (ref 0.67–1.17)
CREAT UR-MCNC: 184 MG/DL
CREAT UR-MCNC: 185 MG/DL
DEPRECATED HCO3 PLAS-SCNC: 25 MMOL/L (ref 22–29)
EGFRCR SERPLBLD CKD-EPI 2021: >90 ML/MIN/1.73M2
ERYTHROCYTE [DISTWIDTH] IN BLOOD BY AUTOMATED COUNT: 12.5 % (ref 10–15)
GLUCOSE SERPL-MCNC: 163 MG/DL (ref 70–99)
GLUCOSE UR STRIP-MCNC: 300 MG/DL
HBA1C MFR BLD: 6 %
HCT VFR BLD AUTO: 43.9 % (ref 40–53)
HGB BLD-MCNC: 14.9 G/DL (ref 13.3–17.7)
HGB UR QL STRIP: NEGATIVE
KETONES UR STRIP-MCNC: NEGATIVE MG/DL
LEUKOCYTE ESTERASE UR QL STRIP: NEGATIVE
MCH RBC QN AUTO: 27.3 PG (ref 26.5–33)
MCHC RBC AUTO-ENTMCNC: 33.9 G/DL (ref 31.5–36.5)
MCV RBC AUTO: 80 FL (ref 78–100)
MICROALBUMIN UR-MCNC: 37.3 MG/L
MICROALBUMIN/CREAT UR: 20.16 MG/G CR (ref 0–17)
MUCOUS THREADS #/AREA URNS LPF: PRESENT /LPF
NITRATE UR QL: NEGATIVE
PH UR STRIP: 5.5 [PH] (ref 5–7)
PHOSPHATE SERPL-MCNC: 2.6 MG/DL (ref 2.5–4.5)
PLATELET # BLD AUTO: 419 10E3/UL (ref 150–450)
POTASSIUM SERPL-SCNC: 3.9 MMOL/L (ref 3.4–5.3)
PROT/CREAT 24H UR: 0.09 MG/MG CR (ref 0–0.2)
RBC # BLD AUTO: 5.46 10E6/UL (ref 4.4–5.9)
RBC URINE: 1 /HPF
SODIUM SERPL-SCNC: 140 MMOL/L (ref 136–145)
SP GR UR STRIP: 1.02 (ref 1–1.03)
UROBILINOGEN UR STRIP-MCNC: NORMAL MG/DL
WBC # BLD AUTO: 10.5 10E3/UL (ref 4–11)
WBC URINE: 1 /HPF

## 2023-09-06 PROCEDURE — G0463 HOSPITAL OUTPT CLINIC VISIT: HCPCS | Performed by: STUDENT IN AN ORGANIZED HEALTH CARE EDUCATION/TRAINING PROGRAM

## 2023-09-06 PROCEDURE — 85027 COMPLETE CBC AUTOMATED: CPT | Performed by: PATHOLOGY

## 2023-09-06 PROCEDURE — 80069 RENAL FUNCTION PANEL: CPT | Performed by: PATHOLOGY

## 2023-09-06 PROCEDURE — 99000 SPECIMEN HANDLING OFFICE-LAB: CPT | Performed by: PATHOLOGY

## 2023-09-06 PROCEDURE — 99204 OFFICE O/P NEW MOD 45 MIN: CPT | Performed by: STUDENT IN AN ORGANIZED HEALTH CARE EDUCATION/TRAINING PROGRAM

## 2023-09-06 PROCEDURE — 82570 ASSAY OF URINE CREATININE: CPT | Performed by: STUDENT IN AN ORGANIZED HEALTH CARE EDUCATION/TRAINING PROGRAM

## 2023-09-06 PROCEDURE — 81001 URINALYSIS AUTO W/SCOPE: CPT | Performed by: PATHOLOGY

## 2023-09-06 PROCEDURE — 36415 COLL VENOUS BLD VENIPUNCTURE: CPT | Performed by: PATHOLOGY

## 2023-09-06 PROCEDURE — 84156 ASSAY OF PROTEIN URINE: CPT | Performed by: PATHOLOGY

## 2023-09-06 PROCEDURE — 83036 HEMOGLOBIN GLYCOSYLATED A1C: CPT | Performed by: STUDENT IN AN ORGANIZED HEALTH CARE EDUCATION/TRAINING PROGRAM

## 2023-09-06 ASSESSMENT — PAIN SCALES - GENERAL: PAINLEVEL: NO PAIN (0)

## 2023-09-06 NOTE — PATIENT INSTRUCTIONS
"It was a pleasure to see you in nephrology clinic today. I've included a brief summary of our discussion and care plan from today's visit below.  _______________________________________________________________________    My recommendations are summarized as follows:  -Keep a Blood Pressure log. Please make sure that you are using a validated blood pressure device (check \"www.validatebp.org\").  -Avoid all NSAID's. Examples include Ibuprofen (Advil, Motrin), naprosyn (Aleve), celebrex among others. Acetaminophen (Tylenol) is ok with maximum dose in 24 hours of 3000mg.  -Healthy lifestyle measures will keep your kidney's functioning at their current best. This includes regular exercise, maintaining a healthy body weight and smoking cessation.   -Please get a blood pressure cuff. I am sending a DME prescription and my nurses will contact you. Let me know if this doesn't work out.   -Please send me your blood pressure readings in 1 week after you have your cuff.     Please return to Nephrology Clinic in 6 months to review your progress.     Who do I call with any questions after my visit?  There are multiple ways to contact your nephrology care team:    -To schedule or reschedule an appointment, please call [phone].  -Reach out via [employer]. These messages are answered by your nurse or doctor during business hours and typically in 1-2 days. [employer] messages are best for quick questions/clarifications/updates. Frequently, your doctor or nurse will recommend setting up a follow up appointment to address any significant questions/concerns.  -For urgent questions after business hours, you may reach the on-call Nephrology Fellow by contacting the [facility] at [phone].    To schedule imaging:   -Please call [phone]     To schedule your lab appointment at the [facility]:  -Please call [phone]    Sincerely,    Dr. Enrrique Ibarra   of Medicine  Division of " Nephrology and Hypertension  [facility]

## 2023-09-06 NOTE — NURSING NOTE
"Chief Complaint   Patient presents with    Consult     Establish care     BP (!) 143/73   Pulse 101   Ht 1.651 m (5' 5\")   Wt 143.9 kg (317 lb 4.8 oz)   BMI 52.80 kg/m    Rhoda Ervin CMA on 9/6/2023 at 2:23 PM    "

## 2023-09-06 NOTE — LETTER
2023       RE: Quan Davidson  [address]  [place] [address]     Dear Colleague,    Thank you for referring your patient, Quan Davidson, to the [facility] [place] [facility]. Please see a copy of my visit note below.        Nephrology Clinic Visit  Quan Davidson MRN: [id number] YOB: 2005  Primary Care Provider: Nidhi Little  ----------------------------------------------------------------------------------------------------------------------  Visit 2023:  --BP Control: 143/73 today in office.   --Current Regimen: None (previously on Lisinopril - this was about 4 years ago)  -DM Control: Fasting glucose acceptable. Unlikely to have DM  --Current Regimen: None  -Creatinine Trend: 0.94 (2023) from 0.9 (6/10/2023)  -Nocturia:0x  -Hematuria: No  -Nephrolithiasis: No  -NSAID Use: Tylenol, very rare.   -Herbal/OTC Medication Use: no  -Family History of Kidney Disease: No  -Family/Friends on RRT/Kidney Transplant: No/No    -Other:  --History of Nephrectomy: Procedure went smoothly.   --History of Hypertension: Was treated for about 2.5 years with Lisnoril. Came off about 4 years ago.;   --History of Obesity: Referred to medical weight management clinic.  Also doing intermittent fasting and this has helped. Has lost about 5lbs.   --Last saw Nephrology about 4 years ago.   --He had COVID when he lived in [place]   --He is currently doing [employer] classes online. Interesting in [employer]    Objective:  PAST MEDICAL HISTORY:  Past Medical History:   Diagnosis Date    Glaucoma (increased eye pressure)     Term birth of male      Traumatic brain injury (H) 2014    UPJ obstruction, congenital 2014    identified on imaging for unrelated trauma       PAST SURGICAL HISTORY:  Past Surgical History:   Procedure Laterality Date    NO HISTORY OF SURGERY    "      MEDICATIONS:  Current Outpatient Medications   Medication Instructions    desonide (DESOWEN) 0.05 % external ointment Topical, 2 TIMES DAILY    doxycycline monohydrate (ADOXA) 50 MG tablet 1 tablet, Oral, 2 TIMES DAILY (Diuretics and Nitrates)    tacrolimus (PROTOPIC) 0.1 % external ointment Topical, 2 TIMES DAILY       FAMILY MEDICAL HISTORY:   Family History   Problem Relation Age of Onset    Diabetes Father     Hypertension Father        PHYSICAL EXAM:   BP (!) 143/73   Pulse 101   Ht 1.651 m (5' 5\")   Wt 143.9 kg (317 lb 4.8 oz)   BMI 52.80 kg/m    GENERAL APPEARANCE: alert and no distress  EYES: nonicteric  HENT: mouth without ulcers or lesions  RESP: lungs clear to auscultation   CV: regular rhythm, normal rate, no rub  ABDOMEN: soft, nontender, normal bowel sounds, no HSM   Extremities: No LE edema  MS: no evidence of inflammation in joints, no muscle tenderness  SKIN: no rash  NEURO: mentation intact and speech normal  PSYCH: affect normal    LABS REVIEWED BY ME:   ANEMIA  Recent Labs   Lab Test 06/10/23  1901   HGB 15.4       BMP  Recent Labs   Lab Test 06/10/23  1901      POTASSIUM 4.6   CHLORIDE 105   CO2 27   ANIONGAP 10   BUN 13.4   CR 0.90   GFRESTIMATED >90   PROTTOTAL 8.1       CBC  Recent Labs   Lab Test 06/10/23  1901   HGB 15.4   WBC 12.2*   HCT 45.5   MCV 83          DIABETESNo lab results found.    HYPONATREMIANo lab results found.    Invalid input(s): UOSM, OSM    MBD  Recent Labs   Lab Test 06/10/23  1901   JOSE ARMANDO 9.9   ALBUMIN 4.5        URINE STUDIES  No lab results found.  No lab results found.    ADDITIONAL LABS ORDERED/REVIEWED BY ME:  See below    Assessment/Plan  CKD Stage G1A1  Established care with me at [facility] 9/6/2023    History of UPJ Obstruction and s/p R Nephrectomy.(Due to poor function and pediatric HTN). Was previously on Lisinopril for about 2.5 years or so. Thinks he came off BP medications about 4 years prior to establishign care with me. His BP " is elevated in office today. He does not have a home BP cuff.    Creatinine is stable at his baseline of 0.9. No hematuria or pyuria on his UA. UPCR is 0.11g/gr.     He has glucosuria. We are sending A1c    He has appt with medical weight loss clinic.     CKD Etiology (Biopsy Proven: No):  -Reduced Renal Mass (R Nephrectomy 2015    Plan:  -Optimize BP Control: I have sent a DME order for BP cuff. He does nto currenlty have proteinuria. Follow up A1c and his BP trends (he will send me readings in 1-2 weeks). Pending his readings and his A1c results we will likely want to get him on an ACEi or ARB. I think we are okay to hold off on secondary workup for HTN at this point given his history of morbid obesity and his history of nephrectomy.   -Avoid NSAIDs  -Agree with referral to medical weight loss clinic. We briefly discussed some medication options to help with weight loss. Encouraged him to keep his appt with the medical weight loss folks.     Other:  -Specialty Care Coordination Referral - CKD G1-G3b w/o imminent RRT planning/needs (Yes/no, Date Referral Placed): No  -Med Therapy Management Referral (Yes/No, Date of Referral): No    Return to clinic: 6 months    Enrrique Ibarra MD   of Medicine  Division of Nephrology and Hypertension  [facility]    35 minutes spent on the date of the encounter doing chart review, history and exam, documentation and further activities as noted above      Again, thank you for allowing me to participate in the care of your patient.      Sincerely,    Enrrique Ibarra MD

## 2023-09-14 ENCOUNTER — PATIENT OUTREACH (OUTPATIENT)
Dept: CARE COORDINATION | Facility: CLINIC | Age: 18
End: 2023-09-14
Payer: COMMERCIAL

## 2023-09-14 NOTE — LETTER
[facility] CARE COORDINATION  [address] [address]    September 14, 2023    Quandelroy Davidson  [address]  [place] [address]      Dear Quan,    I have been attempting to reach you since our last contact. I would like to continue to work with you and provide any additional support you may need on achieving your health care related goals. I would appreciate if you would give me a call at [phone] to let me know if you would like to continue working together. I know that there are many things that can affect our ability to communicate and I hope we can continue to work together.    All of us at the [facility] are invested in your health and are here to assist you in meeting your goals.     Sincerely,    Siri EVANS  Community Health Worker  [facility] Care Coordination  [email or web address]  [email or web address]  Office: [phone]

## 2023-09-14 NOTE — PROGRESS NOTES
Clinic Care Coordination Contact  [facility]/Voicemail       Clinical Data: Care Coordinator Outreach  Outreach attempted x 2.  Left message on patient's voicemail with call back information and requested return call.  Plan: Care Coordinator will send unable to contact letter with care coordinator contact information via [employer]. Care Coordinator will try to reach patient again in 3 weeks= 10/5/2023.    Next PCP appointment is scheduled - 9/26/2023    Siri EVANS  Community Health Worker  [facility] Care Coordination  [facility] [email or web address]  Office: [phone]    
Adequate: hears normal conversation without difficulty

## 2023-09-26 ENCOUNTER — OFFICE VISIT (OUTPATIENT)
Dept: FAMILY MEDICINE | Facility: CLINIC | Age: 18
End: 2023-09-26
Attending: FAMILY MEDICINE
Payer: COMMERCIAL

## 2023-09-26 VITALS
TEMPERATURE: 98.1 F | SYSTOLIC BLOOD PRESSURE: 142 MMHG | OXYGEN SATURATION: 97 % | BODY MASS INDEX: 53.35 KG/M2 | RESPIRATION RATE: 20 BRPM | HEART RATE: 109 BPM | HEIGHT: 64 IN | DIASTOLIC BLOOD PRESSURE: 92 MMHG | WEIGHT: 312.5 LBS

## 2023-09-26 DIAGNOSIS — I10 ESSENTIAL HYPERTENSION: ICD-10-CM

## 2023-09-26 DIAGNOSIS — J30.89 SEASONAL ALLERGIC RHINITIS DUE TO OTHER ALLERGIC TRIGGER: ICD-10-CM

## 2023-09-26 DIAGNOSIS — H01.119 CONTACT DERMATITIS OF EYELID, UNSPECIFIED LATERALITY: ICD-10-CM

## 2023-09-26 DIAGNOSIS — Z00.00 ROUTINE GENERAL MEDICAL EXAMINATION AT A HEALTH CARE FACILITY: Primary | ICD-10-CM

## 2023-09-26 DIAGNOSIS — Z23 ENCOUNTER FOR VACCINATION: ICD-10-CM

## 2023-09-26 DIAGNOSIS — R49.0 VOICE HOARSENESS: ICD-10-CM

## 2023-09-26 PROCEDURE — 90472 IMMUNIZATION ADMIN EACH ADD: CPT | Performed by: FAMILY MEDICINE

## 2023-09-26 PROCEDURE — 90471 IMMUNIZATION ADMIN: CPT | Performed by: FAMILY MEDICINE

## 2023-09-26 PROCEDURE — 90651 9VHPV VACCINE 2/3 DOSE IM: CPT | Performed by: FAMILY MEDICINE

## 2023-09-26 PROCEDURE — 99214 OFFICE O/P EST MOD 30 MIN: CPT | Mod: 25 | Performed by: FAMILY MEDICINE

## 2023-09-26 PROCEDURE — 99395 PREV VISIT EST AGE 18-39: CPT | Mod: 25 | Performed by: FAMILY MEDICINE

## 2023-09-26 PROCEDURE — 90715 TDAP VACCINE 7 YRS/> IM: CPT | Performed by: FAMILY MEDICINE

## 2023-09-26 PROCEDURE — 90686 IIV4 VACC NO PRSV 0.5 ML IM: CPT | Performed by: FAMILY MEDICINE

## 2023-09-26 RX ORDER — CETIRIZINE HYDROCHLORIDE 10 MG/1
10 TABLET ORAL DAILY
Qty: 90 TABLET | Refills: 3 | Status: SHIPPED | OUTPATIENT
Start: 2023-09-26 | End: 2023-11-28

## 2023-09-26 RX ORDER — DESONIDE 0.5 MG/G
OINTMENT TOPICAL 2 TIMES DAILY
Qty: 60 G | Refills: 1 | Status: SHIPPED | OUTPATIENT
Start: 2023-09-26 | End: 2023-11-28

## 2023-09-26 RX ORDER — DOXYCYCLINE 50 MG/1
50 TABLET ORAL
Qty: 120 TABLET | Refills: 0 | Status: SHIPPED | OUTPATIENT
Start: 2023-09-26 | End: 2023-11-28

## 2023-09-26 RX ORDER — TACROLIMUS 1 MG/G
OINTMENT TOPICAL 2 TIMES DAILY
Qty: 60 G | Refills: 1 | Status: SHIPPED | OUTPATIENT
Start: 2023-09-26 | End: 2023-11-28

## 2023-09-26 ASSESSMENT — ENCOUNTER SYMPTOMS
DIZZINESS: 0
JOINT SWELLING: 0
HEARTBURN: 0
PALPITATIONS: 0
SORE THROAT: 0
EYE PAIN: 0
NAUSEA: 0
DYSURIA: 0
PARESTHESIAS: 0
HEMATOCHEZIA: 0
HEADACHES: 0
FREQUENCY: 0
DIARRHEA: 0
CONSTIPATION: 0
WEAKNESS: 0
MYALGIAS: 0
HEMATURIA: 0
NERVOUS/ANXIOUS: 0
FEVER: 0
CHILLS: 0
ARTHRALGIAS: 0
ABDOMINAL PAIN: 0
SHORTNESS OF BREATH: 0
COUGH: 0

## 2023-09-26 ASSESSMENT — PAIN SCALES - GENERAL: PAINLEVEL: MODERATE PAIN (4)

## 2023-09-26 NOTE — PROGRESS NOTES
"SUBJECTIVE:   CC: Quan is an 18 year old who presents for preventative health visit.       9/26/2023    12:49 PM   Additional Questions   Roomed by Maninder NASCIMENTO   Accompanied by N/A       Healthy Habits:     Getting at least 3 servings of Calcium per day:  NO    Bi-annual eye exam:  Yes    Dental care twice a year:  NO    Sleep apnea or symptoms of sleep apnea:  None    Diet:  Regular (no restrictions)    Frequency of exercise:  4-5 days/week    Duration of exercise:  15-30 minutes    Taking medications regularly:  Yes    Medication side effects:  None    Additional concerns today:  Yes    Difficulty Swallowing  -Whenever he has a swallowing action, he has a \"bruised sensation\" on swallowing for five seconds afterwards  -He stopped smoking three years ago, and he moved his nhan's apple and heard a cracking nose.   -Feeling ongoing for a month, getting progressively worse.  -He can \"pop and snap it\". Stopped smoking two years ago, no history of seasonal allergies. 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     PreDiabetes  -No restrictions in his diet  -Patient has only been drinking elias, no sugary beverages, energy drinks, coffee drinks.  -Patient is doing intermittent fasting, he will fast for 36 hours and will eat for 12 hours. He has been doing this for three months.   -Patient was also adding in cardio activity, he feels this is fairly sustainable. He is working up to 100 sit ups, push ups, and and curl ups. Patient reports he eats a lot less during these times.     Have you ever done Advance Care Planning? (For example, a Health Directive, POLST, or a discussion with a medical provider or your loved ones about your wishes): No, advance care planning information given to patient to review.  Advanced care planning was discussed at today's visit.    Social History     Tobacco Use    Smoking status: Never    Smokeless tobacco: Never   Substance Use Topics    Alcohol " "use: Not on file         9/26/2023    12:42 PM   Alcohol Use   Prescreen: >3 drinks/day or >7 drinks/week? Not Applicable       Last PSA: No results found for: PSA    Reviewed orders with patient. Reviewed health maintenance and updated orders accordingly - Yes  Lab work is in process    Reviewed and updated as needed this visit by clinical staff   Tobacco  Allergies  Meds  Problems  Med Hx  Surg Hx  Fam Hx          Reviewed and updated as needed this visit by Provider   Tobacco  Allergies  Meds  Problems  Med Hx  Surg Hx  Fam Hx           Review of Systems   Constitutional:  Negative for chills and fever.   HENT:  Positive for congestion. Negative for ear pain, hearing loss and sore throat.    Eyes:  Negative for pain and visual disturbance.   Respiratory:  Negative for cough and shortness of breath.    Cardiovascular:  Negative for chest pain, palpitations and peripheral edema.   Gastrointestinal:  Negative for abdominal pain, constipation, diarrhea, heartburn, hematochezia and nausea.   Genitourinary:  Negative for dysuria, frequency, genital sores, hematuria and urgency.   Musculoskeletal:  Negative for arthralgias, joint swelling and myalgias.   Skin:  Positive for rash.   Neurological:  Negative for dizziness, weakness, headaches and paresthesias.   Psychiatric/Behavioral:  Negative for mood changes. The patient is not nervous/anxious.      OBJECTIVE:   BP (!) 142/92 (BP Location: Right arm, Patient Position: Sitting, Cuff Size: Adult Large)   Pulse 109   Temp 98.1  F (36.7  C) (Oral)   Resp 20   Ht 1.613 m (5' 3.5\")   Wt 141.7 kg (312 lb 8 oz)   SpO2 97%   BMI 54.49 kg/m      Physical Exam  GENERAL: healthy, alert and no distress  EYES: Eyes grossly normal to inspection, PERRL and conjunctivae and sclerae normal  HENT: normal cephalic/atraumatic, ear canals and TM's normal, nose and mouth without ulcers or lesions, oral mucous membranes moist, tonsillar erythema, and cobblestoning present "   NECK: no adenopathy, no asymmetry, masses, or scars and thyroid normal to palpation  RESP: lungs clear to auscultation - no rales, rhonchi or wheezes  CV: regular rate and rhythm, normal S1 S2, no S3 or S4, no murmur, click or rub, no peripheral edema and peripheral pulses strong  ABDOMEN: soft, nontender, no hepatosplenomegaly, no masses and bowel sounds normal  MS: no gross musculoskeletal defects noted, no edema  SKIN: scattered small hyperpigmented, scaly lesions on the arm with some excoriations present  PSYCH: mentation appears normal, affect normal/bright    Diagnostic Test Results:  NONE    ASSESSMENT/PLAN:   Quan was seen today for physical, pre-diabetic and throat problem.    Diagnoses and all orders for this visit:    Routine general medical examination at a health care facility  Spent majority of the visit reviewing healthy exercise and dietary habits. Patient is intermittent fasting, concerned that he is not getting recommended dietary intake of minerals, vitamins. Patient however feels mentally well and is seeing excellent results. Will message RD to see if other monitoring is needed.    Essential hypertension  Chronic, not well controlled. Concern for possible white coat hypertension, or natural spike due to break in his fasting schedule. Recommend patient track it at home over the next month and revisit.   -     Home Blood Pressure Monitor Order for DME - ONLY FOR DME    Voice hoarseness  Chronic, not well controlled. Concern for possible oshea's nodule. Patient denies any symptoms of acid reflux.   -     Adult ENT  Referral; Future    Contact dermatitis of eyelid, unspecified laterality  Chronic, worsened due to lapse in medication. Refills as below.   -     desonide (DESOWEN) 0.05 % external ointment; Apply topically 2 times daily  -     doxycycline monohydrate (ADOXA) 50 MG tablet; Take 1 tablet (50 mg) by mouth 2 times daily  -     tacrolimus (PROTOPIC) 0.1 % external ointment; Apply  topically 2 times daily    Seasonal allergic rhinitis due to other allergic trigger  -     cetirizine (ZYRTEC) 10 MG tablet; Take 1 tablet (10 mg) by mouth daily    Encounter for vaccination  -     INFLUENZA VACCINE >6 MONTHS (AFLURIA/FLUZONE)  -     HPV 9Y+ (Gardasil 9)  -     TDAP 7+ (ADACEL,BOOSTRIX)    Other orders  -     PRIMARY CARE FOLLOW-UP SCHEDULING        Patient has been advised of split billing requirements and indicates understanding: Yes      COUNSELING:   Reviewed preventive health counseling, as reflected in patient instructions        He reports that he has never smoked. He has never used smokeless tobacco.      NACHO AGUILAR [facility]

## 2023-09-26 NOTE — Clinical Note
Dsetin Mariano, I am the primary care provider for Quan. He is doing a great job with losing weight, and is on a schedule of 36 hour fasting, 12 hours with eating. Due to this severe calorie reduction, I am concerned about monitoring labs. I advised him on getting adequate amounts of protein. Is this something that bariatric medicine docs specialize in and will help with?  Thanks.  NACHO AGUILAR, DO

## 2023-09-26 NOTE — PROGRESS NOTES
SUBJECTIVE:   CC: Quan is an 18 year old who presents for preventative health visit.   {(!) Visit Details have not yet been documented.  Please enter Visit Details and then use this list to pull in documentation. (Optional):300348}    Healthy Habits:     Getting at least 3 servings of Calcium per day:  NO    Bi-annual eye exam:  Yes    Dental care twice a year:  NO    Sleep apnea or symptoms of sleep apnea:  None    Diet:  Regular (no restrictions)    Frequency of exercise:  4-5 days/week    Duration of exercise:  15-30 minutes    Taking medications regularly:  Yes    Medication side effects:  None    Additional concerns today:  Yes          {Add if <65 person on Medicare  - Required Questions (Optional):730045}  {Outside tests to abstract? :716598}    {additional problems to add (Optional):350093}  Have you ever done Advance Care Planning? (For example, a Health Directive, POLST, or a discussion with a medical provider or your loved ones about your wishes): { :587953}    Social History     Tobacco Use    Smoking status: Never    Smokeless tobacco: Never   Substance Use Topics    Alcohol use: Not on file     {Rooming staff  Click this link to complete the Prescreen if response below is not for today's visit  Alcohol Use Prescreen >3 drinks/day or > 7 drinks/week.  If the prescreen question answer is YES, complete the full AUDIT  :964581}        9/26/2023    12:42 PM   Alcohol Use   Prescreen: >3 drinks/day or >7 drinks/week? Not Applicable   {add AUDIT responses (Optional) (A score of 7 for adult men is an indication of hazardous drinking; a score of 8 or more is an indication of an alcohol use disorder.  A score of 7 or more for adult women is an indication of hazardous drinking or an alchohol use disorder):868682}    Last PSA: No results found for: PSA    Reviewed orders with patient. Reviewed health maintenance and updated orders accordingly - { :532151}  {Chronicprobdata (optional):959957}    Reviewed and  "updated as needed this visit by clinical staff                  Reviewed and updated as needed this visit by Provider                 {HISTORY OPTIONS (Optional):593984}    Review of Systems   Constitutional:  Negative for chills and fever.   HENT:  Positive for congestion. Negative for ear pain, hearing loss and sore throat.    Eyes:  Negative for pain and visual disturbance.   Respiratory:  Negative for cough and shortness of breath.    Cardiovascular:  Negative for chest pain, palpitations and peripheral edema.   Gastrointestinal:  Negative for abdominal pain, constipation, diarrhea, heartburn, hematochezia and nausea.   Genitourinary:  Negative for dysuria, frequency, genital sores, hematuria and urgency.   Musculoskeletal:  Negative for arthralgias, joint swelling and myalgias.   Skin:  Positive for rash.   Neurological:  Negative for dizziness, weakness, headaches and paresthesias.   Psychiatric/Behavioral:  Negative for mood changes. The patient is not nervous/anxious.      {MALE ROS (Optional):787983}    OBJECTIVE:   There were no vitals taken for this visit.    Physical Exam  {Exam Choices (Optional):836326}    {Diagnostic Test Results (Optional):636756}    ASSESSMENT/PLAN:   {Diag Picklist:594642}    {Patient advised of split billing (Optional):709869}      COUNSELING:   {MALE COUNSELING MESSAGES:663244::\"Reviewed preventive health counseling, as reflected in patient instructions\"}        He reports that he has never smoked. He has never used smokeless tobacco.      {Counseling Resources  [place] Preventive Services Task Force  Cholesterol Screening  Health diet/nutrition  Pooled Cohorts Equation Calculator  USDA's MyPlate  ASA Prophylaxis  Lung CA Screening  Osteoporosis prevention/bone health :621687}  {Prostate Cancer Screening  Consider for men 55-69 per guidance from USPSTF :928968}    NACHO AGUILAR DO  [facility]  "

## 2023-09-28 ENCOUNTER — OFFICE VISIT (OUTPATIENT)
Dept: OPHTHALMOLOGY | Facility: CLINIC | Age: 18
End: 2023-09-28
Attending: OPTOMETRIST
Payer: COMMERCIAL

## 2023-09-28 DIAGNOSIS — H10.89 PAPILLARY CONJUNCTIVITIS: ICD-10-CM

## 2023-09-28 DIAGNOSIS — H52.203 HIGH DEGREE OF ASTIGMATISM IN BOTH EYES: ICD-10-CM

## 2023-09-28 DIAGNOSIS — H04.129 DRY EYE: Primary | ICD-10-CM

## 2023-09-28 PROCEDURE — 99213 OFFICE O/P EST LOW 20 MIN: CPT | Performed by: OPTOMETRIST

## 2023-09-28 RX ORDER — OLOPATADINE HYDROCHLORIDE 2 MG/ML
1 SOLUTION/ DROPS OPHTHALMIC DAILY
Qty: 2.5 ML | Refills: 4 | Status: SHIPPED | OUTPATIENT
Start: 2023-09-28 | End: 2023-11-28

## 2023-09-28 RX ORDER — FLUOROMETHOLONE 0.1 %
1 SUSPENSION, DROPS(FINAL DOSAGE FORM)(ML) OPHTHALMIC (EYE) 2 TIMES DAILY
Qty: 5 ML | Refills: 0 | Status: SHIPPED | OUTPATIENT
Start: 2023-09-28 | End: 2023-11-28

## 2023-09-28 ASSESSMENT — CONF VISUAL FIELD
OD_SUPERIOR_NASAL_RESTRICTION: 0
OD_SUPERIOR_TEMPORAL_RESTRICTION: 0
OD_INFERIOR_TEMPORAL_RESTRICTION: 0
OS_SUPERIOR_NASAL_RESTRICTION: 0
METHOD: COUNTING FINGERS
OD_INFERIOR_NASAL_RESTRICTION: 0
OD_NORMAL: 1
OS_SUPERIOR_TEMPORAL_RESTRICTION: 0
OS_INFERIOR_NASAL_RESTRICTION: 0
OS_INFERIOR_TEMPORAL_RESTRICTION: 0
OS_NORMAL: 1

## 2023-09-28 ASSESSMENT — VISUAL ACUITY
OD_CC: 20/20
METHOD: SNELLEN - LINEAR
OD_CC+: -2
OS_CC+: -2
CORRECTION_TYPE: GLASSES
OS_CC: 20/20

## 2023-09-28 ASSESSMENT — KERATOMETRY
OS_K2POWER_DIOPTERS: 47.00
OD_K2POWER_DIOPTERS: 46.75
OD_AXISANGLE_DEGREES: 097
OS_K1POWER_DIOPTERS: 41.25
OS_AXISANGLE_DEGREES: 068
OS_AXISANGLE2_DEGREES: 158
OD_K1POWER_DIOPTERS: 41.50
OD_AXISANGLE2_DEGREES: 007

## 2023-09-28 ASSESSMENT — REFRACTION_MANIFEST
OS_CYLINDER: +7.75
OD_SPHERE: -9.25
METHOD_AUTOREFRACTION: 1
OD_AXIS: 097
OS_SPHERE: -9.00
OS_AXIS: 065
OD_CYLINDER: +7.25

## 2023-09-28 ASSESSMENT — REFRACTION_WEARINGRX
OD_SPHERE: -7.50
OD_AXIS: 105
OD_CYLINDER: +5.25
OS_CYLINDER: +6.00
OS_SPHERE: -7.25
OS_AXIS: 065

## 2023-09-28 ASSESSMENT — REFRACTION_CURRENTRX
OS_BRAND: ZENLENS RC
OD_DIAMETER: 14.8
OS_SPHERE: -2.00
OD_SPHERE: -2.00
OS_DIAMETER: 15.4
OD_BRAND: ZENLENS RC

## 2023-09-28 ASSESSMENT — TONOMETRY
OS_IOP_MMHG: 25
OD_IOP_MMHG: 25
IOP_METHOD: ICARE

## 2023-09-28 NOTE — PATIENT INSTRUCTIONS
Diagnoses:  Dry eye  - Primary H04.129    High degree of astigmatism in both eyes H52.203      Lens code:  Scleral Cover Shell. OR  Scleral, Gas permeable    ------------------------    What is a scleral lens?  A scleral lens is a large diameter rigid gas permeable contact lens that vaults completely over the cornea (clear tissue on the front of the eye) and touches only on the white part of the eye (sclera). A liquid layer between the lens and the cornea lubricates the ocular surface and protects from physical rubbing/irritation on the sensitive cornea. These lenses are helpful for a variety of conditions including dry eyes and irregularly shaped corneas.    How are they different than small RGP lenses?  Scleral lenses do not move much in the eye and do not touch the sensitive cornea, so they tend to be much easier to get used to. Insertion is quite different, but removal is similar. To insert them properly they need to be filled with preservative free saline    How do you insert and remove them?  See this website for video demonstration: https://sclerallens.org/for-patients/patient-videos/   We will start you out with all the solutions you need at first, but you will need to buy more if you continue to wear lenses.    How long do they last?  Once the final fit is obtained (the best lens shape for your eye may require several revisions), lenses can last from 1-2 years depending on your eyes.    Does insurance cover them?  Sometimes - this is very dependent on your insurance and which diagnoses they are for. It is usually best if you check directly with your insurance, however we are able to help you with this: call [phone]. We always submit medically necessary lenses to insurance but if your insurance does not cover them you may be responsible for them.     What happens if I lose or break a lens?  In the first 90 days after initial order you are covered under the manufacturing lab's warranty. Please  just call the clinic to let us know. After 90 days we typically need to order a new lens, which will carry a new charge. Custom lenses take 1-2 weeks to arrive, so if you cannot function well without a lens it may be worth getting a backup.

## 2023-09-28 NOTE — PROGRESS NOTES
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 Would prefer scleral lens in the setting of ocular surface disease    Order lenses, RTC 2-3 weeks for lens dispense, I&R    I have confirmed the patient's CC, HPI and reviewed Past Medical History, Past Surgical History, Social History, Family History, Problem List, Medication List and agree with Tech note.     Michelle Rosario, [facility]

## 2023-09-28 NOTE — NURSING NOTE
Chief Complaints and History of Present Illnesses   Patient presents with    Follow Up     Chief Complaint(s) and History of Present Illness(es)       Follow Up              Laterality: both eyes    Associated symptoms: Negative for eye pain, headache, fatigue and floaters    Pain scale: 0/10              Comments    Patient states vision issues each eye. Patient states pressure issue each eye.   TIFFANY Gomez September 28, 2023 11:44 AM

## 2023-10-02 ENCOUNTER — TELEPHONE (OUTPATIENT)
Dept: NEPHROLOGY | Facility: CLINIC | Age: 18
End: 2023-10-02
Payer: COMMERCIAL

## 2023-10-02 NOTE — TELEPHONE ENCOUNTER
LVM & sent mychart // pt needs to schedule 6 month Return Neph with Dr. Ibarra around 3.6.24 with labs prior // first attempt, AN 10.2.23

## 2023-10-04 ENCOUNTER — TELEPHONE (OUTPATIENT)
Dept: FAMILY MEDICINE | Facility: CLINIC | Age: 18
End: 2023-10-04
Payer: COMMERCIAL

## 2023-10-04 ENCOUNTER — PATIENT OUTREACH (OUTPATIENT)
Dept: CARE COORDINATION | Facility: CLINIC | Age: 18
End: 2023-10-04
Payer: COMMERCIAL

## 2023-10-04 NOTE — PROGRESS NOTES
Clinic Care Coordination Contact  CCC RN attempted to reach patient today, but was only able to reach his sister. She stated patient was not available. She asked writer to call back another time. Writer will call back in one week.

## 2023-10-04 NOTE — LETTER
[facility]  Patient Centered Plan of Care  About Me:        Patient Name:  Quan Glass,     I hope you are well! Please give me a call when you can. I would like to hear how you are doing.   Here is a copy of your Care Plan with the goal we are supporting you with at this time. Please let me know if we can help in any other way.     Thanks,     Dave Myhre, RN   CCC RN  [phone]    YOB: 2005  Age:         18 year old   Thai MRN:    [id number] Telephone Information:  Home Phone [phone]   Mobile [phone]   Home Phone [phone]   Home Phone [phone]       Address:  [address] [place] [address] [address] [place], [place] [id number] Email address:  [email or web address]      Emergency Contact(s)    Name Relationship Lgl Grd Work Phone Home Phone Mobile Phone   1. WILMAR JOHNS Sister    [phone]           Primary language:  English     needed? No   [employer]:  [phone] op. 1  Other communication barriers:None    Preferred Method of Communication:     Current living arrangement: I live in a private home with family    Mobility Status/ Medical Equipment: Independent        Health Maintenance  Health Maintenance Reviewed: Due/Overdue   Health Maintenance Due   Topic Date Due    HEPATITIS B IMMUNIZATION (1 of 3 - 3-dose series) Never done    COVID-19 Vaccine (1) Never done    HIV SCREENING  Never done    HEPATITIS C SCREENING  Never done          My Access Plan  Medical Emergency 911   Primary Clinic Line [facility] - [phone]   24 Hour Appointment Line [phone] or  [phone]) (toll-free)   24 Hour Nurse Line [phone] (toll-free)   Preferred Urgent Care [facility] [phone]     Preferred [facility]  [phone]     Preferred Pharmacy [id number] - [place], [place] - [address]      Behavioral Health Crisis Line The National Suicide Prevention Lifeline at 1-539.441.3043 or Text/Call 988             My Care Team Members  Patient Care Team         Relationship Specialty Notifications Start End    Nidhi Little DO PCP - General Family Medicine  7/9/23     Phone: [phone] Fax: [phone] [address] [facility] [address]    Edyta Hawkins, NP Nurse Practitioner Nurse Practitioner  3/3/15     ref to nephro    Phone: [phone] Fax: [phone]         [facility] [address] [facility] [address]    Pili Hernandez MD MD Pediatric Nephrology  3/3/15     Phone: [phone] Fax: [phone]         [address] [address]    Edyta Hawkins NP  Nurse Practitioner  6/10/23     Merged    Phone: [phone] Fax: [phone]         [facility] [address] [facility] [address]    Vivian Gonzalez MD MD Family Medicine  6/12/23     Referring to DERM    Phone: [phone] Fax: [phone]         [address] [facility] [address] [facility] [address]    Jake Lundberg MD MD Dermapathology  6/12/23     Phone: [phone] Fax: [phone]         [address] [address]    Bebeto Vinson MD MD Allergy & Immunology  6/15/23     Phone: [phone] Fax: [phone]         [address] [place] [address]    Lashawn Jhaveri MD MD Dermatology  6/15/23     referring to Allergy    Phone: [phone] Fax: [phone]         [address] [place] [address] [address] [place] [address]    Nidhi Little DO Assigned PCP   6/15/23     Phone: [phone] Fax: [phone] [address] [facility] [address]    Enrrique Ibarra MD MD Nephrology  6/27/23     Phone: [phone] Fax: [phone] [address] [facility] [address]    Myhre, David J, RN Lead Care Coordinator Primary Care - CC Admissions 6/27/23     Nidhi Little DO Referring Physician Family Medicine   7/10/23     Referring Physician to eye    Phone: [phone] Fax: [phone]         [address] [place] [address]    Kailyn Matias MD MD Ophthalmology  7/10/23     Phone: [phone] Fax: [phone]         [address] [facility] [address]    Veronica Abbasi Chi, OD MD Optometry  7/18/23     Phone: [phone] Fax: [phone]          [facility] [address]    Shaina Saunders Community Health Worker  [facility] 8/2/23     Norman Couch MD MD Dermatology  8/30/23     Phone: [phone] Fax: [phone]         [address] [facility] [address]    Uche Islas MD MD Otolaryngology  9/11/23     Phone: [phone] Fax: [phone]         [address] [place] [address]    Enrrique Ibarra MD Assigned Nephrology Provider   9/16/23     Phone: [phone] Fax: [phone]         [address] [place] [address]    Michelle Rosario OD Assigned Surgical Provider   9/30/23     Phone: [phone] Fax: [phone]         [address] [place] [address]              My Care Plans  Self Management and Treatment Plan  Care Plan  Care Plan: General       Problem: HP GENERAL PROBLEM       Long-Range Goal: I would like to work with the [facility] Clinic to assist me with weight loss.       Start Date: 6/28/2023 Expected End Date: 9/28/2023    Recent Progress: 10%    Priority: High    Note:     Barriers: elevated BMI  Strengths: strong advocate for himself.  Patient expressed understanding of goal: yes  Action steps to achieve this goal:  1. I understand Dr. Louis has sent  referral to the B[facility]ric Clinic on my behalf.   2. I understand a representative from the Bariatric Clinic will contact me directly to scheduled an intake appointment.   3. I will report progress towards this goal at scheduled outreach telephone calls from my Care Coordination team.      Discussed on 8/2/23                               Action Plans on File:                        Advance Care Plans/Directives Type:   No data recorded    My Medical and Care Information  Problem List   Patient Active Problem List   Diagnosis    Elevated blood pressure    UPJ (ureteropelvic junction) obstruction      Current Medications and Allergies:  See printed Medication Report.    Care Coordination Start Date: 6/26/2023   Frequency of Care Coordination: monthly     Form Last Updated: 10/04/2023

## 2023-10-04 NOTE — TELEPHONE ENCOUNTER
FYI - Status Update    Who is Calling: nurseStacia with [facility]    Update: Stacia is with patient, [id number] reviewing AFS from 9/26/23 office visit with Reinier Farris NP. Noticed that DME order for patient Quan Rothman attached to AFS for [id number]    Calling to make sure patient receives DME order.      Does caller want a call/response back: No if care team does need to speak with Stacia call back number is

## 2023-10-05 ENCOUNTER — TELEPHONE (OUTPATIENT)
Dept: NEPHROLOGY | Facility: CLINIC | Age: 18
End: 2023-10-05
Payer: COMMERCIAL

## 2023-10-05 NOTE — TELEPHONE ENCOUNTER
Spoke with Behzad MATHIS. Patient she is seeing has the DME for this patient attached to her 9/26/23 AVS summary. DME request has been submitted to [facility].  [facility] report submitted.

## 2023-10-12 ASSESSMENT — SLEEP AND FATIGUE QUESTIONNAIRES
HOW LIKELY ARE YOU TO NOD OFF OR FALL ASLEEP WHILE WATCHING TV: WOULD NEVER DOZE
HOW LIKELY ARE YOU TO NOD OFF OR FALL ASLEEP WHILE SITTING AND TALKING TO SOMEONE: WOULD NEVER DOZE
HOW LIKELY ARE YOU TO NOD OFF OR FALL ASLEEP WHILE SITTING AND READING: WOULD NEVER DOZE
HOW LIKELY ARE YOU TO NOD OFF OR FALL ASLEEP WHILE SITTING INACTIVE IN A PUBLIC PLACE: WOULD NEVER DOZE
HOW LIKELY ARE YOU TO NOD OFF OR FALL ASLEEP WHEN YOU ARE A PASSENGER IN A CAR FOR AN HOUR WITHOUT A BREAK: WOULD NEVER DOZE
HOW LIKELY ARE YOU TO NOD OFF OR FALL ASLEEP WHILE LYING DOWN TO REST IN THE AFTERNOON WHEN CIRCUMSTANCES PERMIT: MODERATE CHANCE OF DOZING
HOW LIKELY ARE YOU TO NOD OFF OR FALL ASLEEP IN A CAR, WHILE STOPPED FOR A FEW MINUTES IN TRAFFIC: WOULD NEVER DOZE
HOW LIKELY ARE YOU TO NOD OFF OR FALL ASLEEP WHILE SITTING QUIETLY AFTER LUNCH WITHOUT ALCOHOL: WOULD NEVER DOZE

## 2023-10-13 NOTE — TELEPHONE ENCOUNTER
FUTURE VISIT INFORMATION      FUTURE VISIT INFORMATION:  Date: 1.10.24  Time: 1:00  Location: [facility]  REFERRAL INFORMATION:  Referring provider:  [facility]  Referring providers clinic:  Derm  Reason for visit/diagnosis  Contact dermatitis of eyelid, unspecified laterality [H01.119]/ referred by Dr Lashawn Jhaveri/ recs in Epic/ appt sched per pt      RECORDS REQUESTED FROM:       Clinic name Comments Records Status Imaging Status   Ophth 9.28.23  [facility]    FP 9.26.23  [facility]    6.10.23  [facility]    Derm 6.15.23  [facility] Epic

## 2023-10-17 ENCOUNTER — VIRTUAL VISIT (OUTPATIENT)
Dept: ENDOCRINOLOGY | Facility: CLINIC | Age: 18
End: 2023-10-17
Payer: COMMERCIAL

## 2023-10-17 VITALS — BODY MASS INDEX: 53.78 KG/M2 | WEIGHT: 315 LBS | HEIGHT: 64 IN

## 2023-10-17 DIAGNOSIS — E66.813 CLASS 3 SEVERE OBESITY WITH SERIOUS COMORBIDITY AND BODY MASS INDEX (BMI) OF 50.0 TO 59.9 IN ADULT, UNSPECIFIED OBESITY TYPE (H): Primary | ICD-10-CM

## 2023-10-17 DIAGNOSIS — E66.01 CLASS 3 SEVERE OBESITY WITH SERIOUS COMORBIDITY AND BODY MASS INDEX (BMI) OF 50.0 TO 59.9 IN ADULT, UNSPECIFIED OBESITY TYPE (H): Primary | ICD-10-CM

## 2023-10-17 PROBLEM — R73.03 PRE-DIABETES: Status: ACTIVE | Noted: 2023-10-17

## 2023-10-17 PROCEDURE — 99205 OFFICE O/P NEW HI 60 MIN: CPT | Mod: 95

## 2023-10-17 ASSESSMENT — PAIN SCALES - GENERAL: PAINLEVEL: MODERATE PAIN (5)

## 2023-10-17 NOTE — NURSING NOTE
Is the patient currently in the state of [place]? YES    Visit mode:VIDEO    If the visit is dropped, the patient can be reconnected by: VIDEO VISIT: Text to cell phone:   Telephone Information:   Mobile [phone]       Will anyone else be joining the visit? NO  (If patient encounters technical issues they should call [phone] :[phone])    How would you like to obtain your AVS? MyChart    Are changes needed to the allergy or medication list? Pt stated no changes to allergies and Pt stated no med changes    Reason for visit: Consult    Paula ROMEROF

## 2023-10-17 NOTE — PROGRESS NOTES
"Virtual Visit Details    Type of service:  Video Visit   Video Start Time: 2:00PM  Video End Time:2:45PM    Originating Location (pt. Location): Home    Distant Location (provider location):  Off-site  Platform used for Video Visit: Meniga    60 minutes spent by me on the date of the encounter doing chart review, history and exam, documentation and further activities per the note    New Medical Weight Management Consult    PATIENT:  Quan Davidson  MRN:         [id number]  :         2005  RINA:         10/17/2023        I had the pleasure of seeing your patient, Quan Davidson. Full intake/assessment was done to determine barriers to weight loss success and develop a treatment plan. Quan Davidson is a 18 year old male interested in treatment of medical problems associated with excess weight. He has a height of 5' 3.504\", a weight of 315 lbs 0 oz, and the calculated Body mass index is 54.92 kg/m .        Assessment & Plan   Problem List Items Addressed This Visit       Class 3 severe obesity with serious comorbidity and body mass index (BMI) of 50.0 to 59.9 in adult (H) - Primary     Overweight onset in childhood. Was 260lbs in high school. Was able to lose from 260lbs to 214lb wyatt year through going to the gym daily and low calorie diet. Was not sustainble through the pandemic, and regain weight. Weight also influenced by ETOH and drug use. Has been sober for the past 2 years. Highest weight in life of 330lbs. Recently abl to lose 15lbs through extreme intermittent fasting for 1.5 months. Has tried to lose weight in the past year through other diets, but did not see any results. Wants to lose weight to improve overall health.     Discussed current diet of intermittent fasting - 36 hour fast and 12 hours of eating. Was doing this for 1.5 months and lost weight, but did not find it sustainable long term. However, he has a goal to complete for a year to see continued weight loss. Discussed " "my concerns for how this long of a fast is unhealthy, especially long term. Also concerned for longevity and sustainability, like he has already seen - stopped fast so could enjoy meals with family. Will have further discussion with dietitian about more healthy and sustainable options. Also discussed all or nothing mentality and finding more middle group with diet and exercise in general.     Discussed that AOMs in the future could be an option. He would like to start with life style changes at this time. Will discuss AOMs in the future.              Will continue to work on life style changes at this time. Can consider weight loss medications in the future.   GILDARDO Mariano on 10/19/23 - discuss sustainable diets   Follow up with Nirmala Nelson in 3 months         Quan FLORA Joey Davidson is a 18 year old male who presents to clinic today for the following health issues.     He has the following co-morbidities:        10/12/2023     9:22 AM   --   I have the following health issues associated with obesity Pre-Diabetes    High Blood Pressure   I have the following symptoms associated with obesity Knee Pain    Back Pain    Fatigue    Hip Pain     Pre-diabetes - A1C 6.0 9/6/23     HTN - followed by nephrology and PCP. 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 Very Concerned   I became overweight As a Teenager   The following factors have contributed to my weight gain Eating Wrong Types of Food    Eating Too Much    Lack of Exercise    Stress   I have tried the following methods to lose weight Watching Portions or Calories    Exercise    Fasting   My lowest weight since age 16 was 285   My " highest weight since age 16 was 333   The most weight I have ever lost was (lbs) 40   I have the following family history of obesity/being overweight Many of my relatives are overweight   How has your weight changed over the last year? Gained   How many pounds? 50     Overweight onset in childhood. Previously was able to lose weight through increase activity and low calorie diet - lost from 260lbs to 214lbs in a years time. Weight regain was gradual after it was no longer sustainable. Influenced by significant symptoms of depression - leading to increase in THC and ETOH. 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 Highest weight of 330lbs. Started intermitenet fasting and has lost 14lbs in the past year. Stopped strict intermintent fasting due to wanting to enjoy time with family. Wants to lose weight improve overall health    Is currently fasting 36hours and eating 12hours - will this twice a week. Not currently very strict Decrease in portion sizes, otherwise will vomit. Previously had increase hunger and thoughts of food prior to fasting. No cravings. Can get full, previously would eat past full. But struggles to stay full. Drinks water. Eats out/orders food daily.     Activity - Is doing working out daily.     Has not tried AOMs in the past.         10/12/2023     9:22 AM   Diet Recall Review with Patient   If you do eat lunch, what types of food do you typically eat? some kind of protein with tortilla chips   If you do eat supper, what types of food do you typically eat? typically leftover of lunch   If you do snack, what types of food do you typically eat? sweets and chips or peanuts   How many glasses of juice do you drink in a typical day? 0   How many of glasses of milk do you drink in a typical day? 0   How many 8oz glasses of sugar containing drinks such as Leobardo-Aid/sweet tea do you drink in a day? 0   How many cans/bottles of sugar pop/soda/tea/sports drinks do you drink in a day? 0   How many cans/bottles of diet  pop/soda/tea or sports drink do you drink in a day? 0   How often do you have a drink of alcohol? Never           10/12/2023     9:22 AM   Eating Habits   Generally, my meals include foods like these bread, pasta, rice, potatoes, corn, crackers, sweet dessert, pop, or juice Less Than Weekly   Generally, my meals include foods like these fried meats, brats, burgers, french fries, pizza, cheese, chips, or ice cream Once a Week   Eat fast food (like McDonal[employer], [employer], Taco Bell) Less Than Weekly   Eat at a buffet or sit-down restaurant Less Than Weekly   Eat most of my meals in front of the TV or computer A Few Times a Week   Often skip meals, eat at random times, have no regular eating times Everyday   Rarely sit down for a meal but snack or graze throughout Less Than Weekly   Eat extra snacks between meals A Few Times a Week   Eat most of my food at the end of the day Less Than Weekly   Eat in the middle of the night or wake up at night to eat Never   Eat extra snacks to prevent or correct low blood sugar Never   Eat to prevent acid reflux or stomach pain Never   Worry about not having enough food to eat Never   I eat when I am depressed Never   I eat when I am stressed Never   I eat when I am bored Never   I eat when I am anxious Never   I eat when I am happy or as a reward A Few Times a Week   I feel hungry all the time even if I just have eaten Never   Feeling full is important to me Never   I finish all the food on my plate even if I am already full Less Than Weekly   I can't resist eating delicious food or walk past the good food/smell Never   I eat/snack without noticing that I am eating Never   I eat when I am preparing the meal Less Than Weekly   I eat more than usual when I see others eating Never   I have trouble not eating sweets, ice cream, cookies, or chips if they are around the house A Few Times a Week   I think about food all day Never   What foods, if any, do you crave? Chips/Crackers   Please  list any other foods you crave? pastas or anything with tomato sauce           10/12/2023     9:22 AM   Amount of Food   I feel out of control when eating Never   I eat a large amount of food, like a loaf of bread, a box of cookies, a pint/quart of ice cream, all at once Never   I eat a large amount of food even when I am not hungry Never   I eat rapidly Never   I eat alone because I feel embarrassed and do not want others to see how much I have eaten Never   I eat until I am uncomfortably full Never   I feel bad, disgusted, or guilty after I overeat Almost Everyday           10/12/2023     9:22 AM   Activity/Exercise History   How much of a typical 12 hour day do you spend sitting? Most of the Day   How much of a typical 12 hour day do you spend lying down? Less Than Half the Day   How much of a typical day do you spend walking/standing? Half the Day   How many hours (not including work) do you spend on the TV/Video Games/Computer/Tablet/Phone? 6 Hours or More   How many times a week are you active for the purpose of exercise? 6-7 Times a Week   What keeps you from being more active? Unsure What To Do    Other   How many total minutes do you spend doing some activity for the purpose of exercising when you exercise? 15-30 Minutes       PAST MEDICAL HISTORY:  Past Medical History:   Diagnosis Date    Glaucoma (increased eye pressure)     Term birth of male      Traumatic brain injury (H) 2014    UPJ obstruction, congenital 2014    identified on imaging for unrelated trauma           10/12/2023     9:22 AM   Work/Social History Reviewed With Patient   My employment status is Unemployed    Student   My job is single/school   How much of your job is spent on the computer or phone? 100%   How many hours do you spend commuting to work daily? 0   What is your marital status? Single   Who do you live with? my sister   Who does the food shopping? my sister     Currently in [employer] programs - is online. Has 3  "more tests to complete. Very supportive     No ETOH, drugs, THC, or nicotine         10/12/2023     9:22 AM   Mental Health History Reviewed With Patient   Have you ever been physically or sexually abused? No   How often in the past 2 weeks have you felt little interest or pleasure in doing things? Not at all   Over the past 2 weeks how often have you felt down, depressed, or hopeless? Not at all           10/12/2023     9:22 AM   Sleep History Reviewed With Patient   How many hours do you sleep at night? 6       MEDICATIONS:   Current Outpatient Medications   Medication Sig Dispense Refill    cetirizine (ZYRTEC) 10 MG tablet Take 1 tablet (10 mg) by mouth daily 90 tablet 3    fluorometholone (FML LIQUIFILM) 0.1 % ophthalmic suspension Place 1 drop into both eyes 2 times daily 5 mL 0    olopatadine (PATADAY) 0.2 % ophthalmic solution Place 0.05 mLs (1 drop) into both eyes daily 2.5 mL 4    desonide (DESOWEN) 0.05 % external ointment Apply topically 2 times daily 60 g 1    doxycycline monohydrate (ADOXA) 50 MG tablet Take 1 tablet (50 mg) by mouth 2 times daily 120 tablet 0    tacrolimus (PROTOPIC) 0.1 % external ointment Apply topically 2 times daily 60 g 1       ALLERGIES:   Allergies   Allergen Reactions    Ibuprofen            10/12/2023     9:01 AM   KAROL Score (Last Two)   KAROL Raw Score 39   Activation Score 90.2   KAROL Level 4           Objective    Ht 1.613 m (5' 3.5\")   Wt 142.9 kg (315 lb)   BMI 54.92 kg/m           Vitals:  No vitals were obtained today due to virtual visit.    Physical Exam   GENERAL: Healthy, alert and no distress  EYES: Eyes grossly normal to inspection.  No discharge or erythema, or obvious scleral/conjunctival abnormalities.  RESP: No audible wheeze, cough, or visible cyanosis.  No visible retractions or increased work of breathing.    SKIN: Visible skin clear. No significant rash, abnormal pigmentation or lesions.  NEURO: Cranial nerves grossly intact.  Mentation and speech " appropriate for age.  PSYCH: Mentation appears normal, affect normal/bright, judgement and insight intact, normal speech and appearance well-groomed.     Anti-obesity medication ROS:    HEENT  Hx of glaucoma: No    Cardiovascular  CAD:No  HTN:Yes    Gastrointestinal  GERD:No  Constipation:No  Liver Dz:No  H/O Pancreatitis:No    Psychiatric  Bipolar: No  Anxiety:No  Depression:No  History of alcohol/drug abuse: No  Hx of eating disorder:No    Endocrine  Personal or family hx of MTC or MEN2:No  Diabetes/prediabetes: Yes    Neurologic:  Hx of seizures: No  Hx of migraines: No  Memory Impairment: No      History of kidney stones: No  Kidney disease: No, hx of nephrectomy   Current birth control:  N/A    Taking Opioid/Narcotic: No        Sincerely,    OJ FERNANDEZ PA-C

## 2023-10-17 NOTE — LETTER
"10/17/2023       RE: Quan Davidson  [address]  [place] [address]     Dear Colleague,    Thank you for referring your patient, Quan Davidson, to the [facility] [place] at [facility]. Please see a copy of my visit note below.    Virtual Visit Details    Type of service:  Video Visit   Video Start Time: 2:00PM  Video End Time:2:45PM    Originating Location (pt. Location): Home    Distant Location (provider location):  Off-site  Platform used for Video Visit: InRiver    60 minutes spent by me on the date of the encounter doing chart review, history and exam, documentation and further activities per the note    New Medical Weight Management Consult    PATIENT:  Quan Davidson  MRN:         [id number]  :         2005  RINA:         10/17/2023        I had the pleasure of seeing your patient, Quan Davidson. Full intake/assessment was done to determine barriers to weight loss success and develop a treatment plan. Quan Davidson is a 18 year old male interested in treatment of medical problems associated with excess weight. He has a height of 5' 3.504\", a weight of 315 lbs 0 oz, and the calculated Body mass index is 54.92 kg/m .        Assessment & Plan  Problem List Items Addressed This Visit       Class 3 severe obesity with serious comorbidity and body mass index (BMI) of 50.0 to 59.9 in adult (H) - Primary     Overweight onset in childhood. Was 260lbs in high school. Was able to lose from 260lbs to 214lb wyatt year through going to the gym daily and low calorie diet. Was not sustainble through the pandemic, and regain weight. Weight also influenced by ETOH and drug use. Has been sober for the past 2 years. Highest weight in life of 330lbs. Recently abl to lose 15lbs through extreme intermittent fasting for 1.5 months. Has tried to lose weight in the past year through other diets, but " "did not see any results. Wants to lose weight to improve overall health.     Discussed current diet of intermittent fasting - 36 hour fast and 12 hours of eating. Was doing this for 1.5 months and lost weight, but did not find it sustainable long term. However, he has a goal to complete for a year to see continued weight loss. Discussed my concerns for how this long of a fast is unhealthy, especially long term. Also concerned for longevity and sustainability, like he has already seen - stopped fast so could enjoy meals with family. Will have further discussion with dietitian about more healthy and sustainable options. Also discussed all or nothing mentality and finding more middle group with diet and exercise in general.     Discussed that AOMs in the future could be an option. He would like to start with life style changes at this time. Will discuss AOMs in the future.              Will continue to work on life style changes at this time. Can consider weight loss medications in the future.   GILDARDO Mariano on 10/19/23 - discuss sustainable diets   Follow up with Nirmala Nelson in 3 months         Quan I Joey Davidson is a 18 year old male who presents to clinic today for the following health issues.     He has the following co-morbidities:        10/12/2023     9:22 AM   --   I have the following health issues associated with obesity Pre-Diabetes    High Blood Pressure   I have the following symptoms associated with obesity Knee Pain    Back Pain    Fatigue    Hip Pain     Pre-diabetes - A1C 6.0 9/6/23     HTN - followed by nephrology and PCP. 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 Very Concerned   I became overweight As a Teenager   The following factors have contributed to my weight gain Eating Wrong Types of Food    Eating Too Much    Lack of Exercise    Stress   I have tried the following methods to lose weight Watching Portions or Calories    Exercise    Fasting   My lowest weight since age 16 was 285   My highest weight since age 16 was 333   The most weight I have ever lost was (lbs) 40   I have the following family history of obesity/being overweight Many of my relatives are overweight   How has your weight changed over the last year? Gained   How many pounds? 50     Overweight onset in childhood. Previously was able to lose weight through increase activity and low calorie diet - lost from 260lbs to 214lbs in a years time. Weight regain was gradual after it was no longer sustainable. Influenced by significant symptoms of depression - leading to increase in THC and ETOH. 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 Highest weight of 330lbs. Started intermitenet fasting and has lost 14lbs in the past year. Stopped strict intermintent fasting due to wanting to enjoy time with family. Wants to lose weight improve overall health    Is currently fasting 36hours and eating 12hours - will this twice a week. Not currently very strict Decrease in portion sizes, otherwise will vomit. Previously had increase hunger and thoughts of food prior to fasting. No cravings. Can get full, previously would eat past full. But struggles to stay full. Drinks water. Eats out/orders food daily.     Activity - Is doing working out daily.     Has not tried AOMs in the past.         10/12/2023     9:22 AM   Diet Recall Review with Patient   If you do eat lunch, what types of food do you typically eat? some kind of protein with tortilla chips   If you do eat supper, what types of food do you typically eat? typically leftover of lunch   If you do snack, what types of food do you typically eat? sweets and chips or peanuts    How many glasses of juice do you drink in a typical day? 0   How many of glasses of milk do you drink in a typical day? 0   How many 8oz glasses of sugar containing drinks such as Leobardo-Aid/sweet tea do you drink in a day? 0   How many cans/bottles of sugar pop/soda/tea/sports drinks do you drink in a day? 0   How many cans/bottles of diet pop/soda/tea or sports drink do you drink in a day? 0   How often do you have a drink of alcohol? Never           10/12/2023     9:22 AM   Eating Habits   Generally, my meals include foods like these bread, pasta, rice, potatoes, corn, crackers, sweet dessert, pop, or juice Less Than Weekly   Generally, my meals include foods like these fried meats, brats, burgers, french fries, pizza, cheese, chips, or ice cream Once a Week   Eat fast food (like McDonalds, Burger Lew, Taco Bell) Less Than Weekly   Eat at a buffet or sit-down restaurant Less Than Weekly   Eat most of my meals in front of the TV or computer A Few Times a Week   Often skip meals, eat at random times, have no regular eating times Everyday   Rarely sit down for a meal but snack or graze throughout Less Than Weekly   Eat extra snacks between meals A Few Times a Week   Eat most of my food at the end of the day Less Than Weekly   Eat in the middle of the night or wake up at night to eat Never   Eat extra snacks to prevent or correct low blood sugar Never   Eat to prevent acid reflux or stomach pain Never   Worry about not having enough food to eat Never   I eat when I am depressed Never   I eat when I am stressed Never   I eat when I am bored Never   I eat when I am anxious Never   I eat when I am happy or as a reward A Few Times a Week   I feel hungry all the time even if I just have eaten Never   Feeling full is important to me Never   I finish all the food on my plate even if I am already full Less Than Weekly   I can't resist eating delicious food or walk past the good food/smell Never   I eat/snack without  noticing that I am eating Never   I eat when I am preparing the meal Less Than Weekly   I eat more than usual when I see others eating Never   I have trouble not eating sweets, ice cream, cookies, or chips if they are around the house A Few Times a Week   I think about food all day Never   What foods, if any, do you crave? Chips/Crackers   Please list any other foods you crave? pastas or anything with tomato sauce           10/12/2023     9:22 AM   Amount of Food   I feel out of control when eating Never   I eat a large amount of food, like a loaf of bread, a box of cookies, a pint/quart of ice cream, all at once Never   I eat a large amount of food even when I am not hungry Never   I eat rapidly Never   I eat alone because I feel embarrassed and do not want others to see how much I have eaten Never   I eat until I am uncomfortably full Never   I feel bad, disgusted, or guilty after I overeat Almost Everyday           10/12/2023     9:22 AM   Activity/Exercise History   How much of a typical 12 hour day do you spend sitting? Most of the Day   How much of a typical 12 hour day do you spend lying down? Less Than Half the Day   How much of a typical day do you spend walking/standing? Half the Day   How many hours (not including work) do you spend on the TV/Video Games/Computer/Tablet/Phone? 6 Hours or More   How many times a week are you active for the purpose of exercise? 6-7 Times a Week   What keeps you from being more active? Unsure What To Do    Other   How many total minutes do you spend doing some activity for the purpose of exercising when you exercise? 15-30 Minutes       PAST MEDICAL HISTORY:  Past Medical History:   Diagnosis Date     Glaucoma (increased eye pressure)      Term birth of male       Traumatic brain injury (H) 2014     UPJ obstruction, congenital 2014    identified on imaging for unrelated trauma           10/12/2023     9:22 AM   Work/Social History Reviewed With Patient  "  My employment status is Unemployed    Student   My job is single/school   How much of your job is spent on the computer or phone? 100%   How many hours do you spend commuting to work daily? 0   What is your marital status? Single   Who do you live with? my sister   Who does the food shopping? my sister     Currently in [employer] programs - is online. Has 3 more tests to complete. Very supportive     No ETOH, drugs, THC, or nicotine         10/12/2023     9:22 AM   Mental Health History Reviewed With Patient   Have you ever been physically or sexually abused? No   How often in the past 2 weeks have you felt little interest or pleasure in doing things? Not at all   Over the past 2 weeks how often have you felt down, depressed, or hopeless? Not at all           10/12/2023     9:22 AM   Sleep History Reviewed With Patient   How many hours do you sleep at night? 6       MEDICATIONS:   Current Outpatient Medications   Medication Sig Dispense Refill     cetirizine (ZYRTEC) 10 MG tablet Take 1 tablet (10 mg) by mouth daily 90 tablet 3     fluorometholone (FML LIQUIFILM) 0.1 % ophthalmic suspension Place 1 drop into both eyes 2 times daily 5 mL 0     olopatadine (PATADAY) 0.2 % ophthalmic solution Place 0.05 mLs (1 drop) into both eyes daily 2.5 mL 4     desonide (DESOWEN) 0.05 % external ointment Apply topically 2 times daily 60 g 1     doxycycline monohydrate (ADOXA) 50 MG tablet Take 1 tablet (50 mg) by mouth 2 times daily 120 tablet 0     tacrolimus (PROTOPIC) 0.1 % external ointment Apply topically 2 times daily 60 g 1       ALLERGIES:   Allergies   Allergen Reactions     Ibuprofen            10/12/2023     9:01 AM   KAROL Score (Last Two)   KAROL Raw Score 39   Activation Score 90.2   KAROL Level 4           Objective   Ht 1.613 m (5' 3.5\")   Wt 142.9 kg (315 lb)   BMI 54.92 kg/m           Vitals:  No vitals were obtained today due to virtual visit.    Physical Exam   GENERAL: Healthy, alert and no distress  EYES: Eyes grossly " normal to inspection.  No discharge or erythema, or obvious scleral/conjunctival abnormalities.  RESP: No audible wheeze, cough, or visible cyanosis.  No visible retractions or increased work of breathing.    SKIN: Visible skin clear. No significant rash, abnormal pigmentation or lesions.  NEURO: Cranial nerves grossly intact.  Mentation and speech appropriate for age.  PSYCH: Mentation appears normal, affect normal/bright, judgement and insight intact, normal speech and appearance well-groomed.     Anti-obesity medication ROS:    HEENT  Hx of glaucoma: No    Cardiovascular  CAD:No  HTN:Yes    Gastrointestinal  GERD:No  Constipation:No  Liver Dz:No  H/O Pancreatitis:No    Psychiatric  Bipolar: No  Anxiety:No  Depression:No  History of alcohol/drug abuse: No  Hx of eating disorder:No    Endocrine  Personal or family hx of MTC or MEN2:No  Diabetes/prediabetes: Yes    Neurologic:  Hx of seizures: No  Hx of migraines: No  Memory Impairment: No      History of kidney stones: No  Kidney disease: No, hx of nephrectomy   Current birth control:  N/A    Taking Opioid/Narcotic: No        Sincerely,    OJ FERNANDEZ PA-C      Again, thank you for allowing me to participate in the care of your patient.      Sincerely,    OJ FERNANDEZ PA-C

## 2023-10-17 NOTE — PROGRESS NOTES
"Virtual Visit Details    Type of service:  Video Visit   Video Start Time: {video visit start/end time for provider to select:742741}  Video End Time:{video visit start/end time for provider to select:533283}    Originating Location (pt. Location): {video visit patient location:452559::\"Home\"}  {PROVIDER LOCATION On-site should be selected for visits conducted from your clinic location or adjoining [facility] hospital, academic office, or other nearby [facility] building. Off-site should be selected for all other provider locations, including home:395492}  Distant Location (provider location):  {virtual location provider:633381}  Platform used for Video Visit: {Virtual Visit Platforms:879676::\"[employer]\"}    "

## 2023-10-18 PROBLEM — E66.813 CLASS 3 SEVERE OBESITY WITH SERIOUS COMORBIDITY AND BODY MASS INDEX (BMI) OF 50.0 TO 59.9 IN ADULT (H): Status: ACTIVE | Noted: 2023-10-18

## 2023-10-18 PROBLEM — E66.01 MORBID OBESITY (H): Status: ACTIVE | Noted: 2023-10-18

## 2023-10-19 ENCOUNTER — VIRTUAL VISIT (OUTPATIENT)
Dept: ENDOCRINOLOGY | Facility: CLINIC | Age: 18
End: 2023-10-19
Payer: COMMERCIAL

## 2023-10-19 DIAGNOSIS — E66.813 CLASS 3 SEVERE OBESITY WITH SERIOUS COMORBIDITY AND BODY MASS INDEX (BMI) OF 50.0 TO 59.9 IN ADULT, UNSPECIFIED OBESITY TYPE (H): ICD-10-CM

## 2023-10-19 DIAGNOSIS — E66.01 CLASS 3 SEVERE OBESITY WITH SERIOUS COMORBIDITY AND BODY MASS INDEX (BMI) OF 50.0 TO 59.9 IN ADULT, UNSPECIFIED OBESITY TYPE (H): ICD-10-CM

## 2023-10-19 DIAGNOSIS — Z71.3 NUTRITIONAL COUNSELING: Primary | ICD-10-CM

## 2023-10-19 PROCEDURE — 97802 MEDICAL NUTRITION INDIV IN: CPT | Mod: 95 | Performed by: DIETITIAN, REGISTERED

## 2023-10-19 PROCEDURE — 99207 PR NO CHARGE LOS: CPT | Mod: 95 | Performed by: DIETITIAN, REGISTERED

## 2023-10-19 NOTE — PATIENT INSTRUCTIONS
Destin Helton,     Follow-up with RD in 1-2 months    Thank you,    Montserrat Nazario, RD, LD  If you would like to schedule or reschedule an appointment with the RD, please call [phone]    Nutrition Goals  1) Follow 2300 calorie/day plan  2) At least 80 gm protein daily, at most 150 gm/day.   3) Limit carb to 60-75 gm per meal, and less than 15 gm per snack.   4) Follow 16:8 time-restricted eating. Fasting for 16 hours, 8 hour eating window daily.        The Plate Method:  Plate method can be used for general guidance on balanced meals/portion sizes (see link below for picture/more information)                 Make 1/2 your plate non starchy vegetables (cauliflower, broccoli, asparagus, [place] sprouts, lettuce, carrots, for example).                3+ oz lean protein sources (salmon/skinless chicken/turkey breast/pork loin/lean cuts of beef/ or non-animal protein such as black, kidney or regalado beans, tofu/edamame/tempeh)    (Note: 3 oz = deck of cards size)                1/2 to 1 cup carbohydrate choices such as whole grain starches or starchy vegetables or fruit. For example: quinoa, brown rice, barley, potatoes, sweet potatoes, winter squash, peas, corn, or fruit.                Choose ~0-2 added fat servings at a meal (avocados, nut butters, nuts or seeds, olive oil, vegetable oils).    Http://www.[place]/[email or web address]    https://[place]/[email or web address]    https://www.cdc.gov/diabetes/images/managing/Diabetes-Manage-Eat-Well-Plate-Graphic_600px.jpg    Protein Sources   http://[place]/755401.[email or web address]     Carbohydrates  [email or web address]://[email or web address]     Mindful Eating  http://[place]/[email or web address]     Summary of Volumetrics Eating Plan  [email or web address]     Meal Replacement Shake Options:   *Protein Shake Criteria: no more than 210 Calories, at least 20 grams of protein, and less than 10 grams of sugar   [employer] [facility] smoothie (160 Calories, 20 g protein)   Premier Protein (160 Calories, 30 g  protein)  Slim Fast Advanced Nutrition (180 Calories, 20 g protein)  Muscle Milk, lactose-free, 17 oz bottle (210 Calories, 30 g protein)  Integrated Supplements, no artificial sugars (110 Calories, 20 g protein)  Boost/Ensure Max (160 calories, 30 gm protein)   Fairlife Protein Shakes (160-230 calories, 26-42 gm protein)  Aldi's Elevation Protein Powder (180 calories, 30 gm protein)   Orgain Protein Shakes (130-160 calories, 20-26 gm protein)     Meal Replacement Bar Options:  [facility] Protein Shake (160 Calories, 15 g protein)  Quest Protein Bars (190 Calories, 20 g protein)  Built Bar (170 Calories, 15-20 g protein)  One Protein Bar (210 calories, 20 g protein)  G[place]man Signature Protein Bar (Costco) (190 Calories, 21 g protein)  Pure Protein Bars (180 Calories, 21 g protein)    Low Calorie Frozen Meal:  Healthy Choice Power Bowls  Lean Cuisine  Smart Ones  Danilo Olivares    At Home Exercise Resources  ACE Fitness - Exercises by Muscle Group  https://www.acefitness.org/resources/everyone/exercise-library/    Plus Size Beginner Workout - Standing (low impact)  https://www.BabyListube.com/watch?v=6FlAPUz3DMU      Plus Size Full Body (created by a woman who struggled to find videos that were inclusive to larger bodies so she decided to make her own!)   https://www.BabyListube.com/watch?v=2NgSE5e-go1     Standing Core Workout for Beginners   https://www.BabyListube.com/watch?v=L83z3glrg21     Dance Workouts: The Fitness Liborio   https://www.BabyListube.com/user/Sailaja    HIIT Workouts: PopSugar Fitness  https://www.BabyListube.com/user/popsugartvfit    Pilates: Blogilates  https://www.BabyListube.com/user/blogilates    Yoga: Yoga with Suma   https://www.BabyListube.com/user/yogawithadriene/featured    Strength Training: HASfit  https://www.BabyListube.com/channel/OOBAW3-ZAFNf65IM-r1GMxmC    Handouts Relating to Exercise :    1.     Learning About Being Physically Active     2.      Muscle Conditionin Exercises    3.      Resistance Training with Free Weights: 3 Exercises    4.      Resistance Training with Surgical Tubin Exercises    5.     Stretchin Exercises    6.     Seated Exercises for Arms and Legs: 11 Exercises        COMPREHENSIVE WEIGHT MANAGEMENT PROGRAM  VIRTUAL SUPPORT GROUPS    For Support Group Information:      We offer support groups for patients who are working on weight loss and considering, preparing for or have had weight loss surgery.   There is no cost for this opportunity.  You are invited to attend the?Virtual Support Groups?provided by any of the following locations:    [facility] via Microsoft Teams with Karen Hendrix RN  2.   [place] via Pensqr with Bebeto Liu, PhD, LP  3.   [place] via Pensqr with Brianda Short RN  4.   [facility] via Care Team Connect Teams with Brianda Miller [facility]    The following Support Group information can also be found on our website:  https://www.[email or web address]/treatments/weight-loss-surgery-support-groups    https://www.[facility].org/treatments/weight-loss-and-weight-loss-surgery-support-groups      [facility] Weight Loss Surgery Support Group    [facility] Weight Loss Surgery Support Group  The support group is a patient-lead forum that meets monthly to share experiences, encouragement and education. It is open to those who have had weight loss surgery, are scheduled for surgery, or are considering surgery.   WHEN: This group meets on the  of each month from 5:00PM - 6:00PM virtually using Microsoft Teams.   FACILITATOR: Led by Karen Abdullahi RD, BASIL, RN, the program's Clinical Coordinator.   TO REGISTER: Please contact the clinic via [employer] or call the nurse line directly at [phone] to inform our staff that you would like an invite sent to you and to let us know the email you would like the invite sent to. Prior to the meeting, a link with directions on how to join the meeting will  "be sent to you.    2023 Meetings April 19: Guest Speaker, Dav Hernadez, RD, LD, \"Maintaining Weight Loss after Bariatric Surgery\".  May 17: \"Let's Talk\" a time for the group to share.  June 21: \"Let's Talk\" a time for the group to share.  July 19  August 16  September 20  October 18  November 15  December 20    [facility] Support Groups    Connections Bariatric Care Support Group?  This is open to all pre- and post- operative bariatric surgery patients as well as their support system.   WHEN: This group meets the 2nd Tuesday of each month from 6:30 PM - 8:00 PM virtually using Microsoft Teams.   FACILITATOR: Led by Bebeto Liu, Ph.D who is a Licensed Psychologist with the [facility] Comprehensive Weight Management Program.   TO REGISTER: Please send an email to Bebeto Liu, Ph.D., LP at?[email or web address]?if you would like an invitation to the group and to learn about using Microsoft Teams.    2023 Meetings April 11: Guest speaker, Justine Calloway MD, Bariatrician, [facility] Comprehensive Weight Management Program, \"Injectable Weight Loss Medications\".  May 9  Nayely 13  July 11  August 8  September 12  October 10  November 14  December 12    Connections Post-Operative Bariatric Surgery Support Group  This is a support group for [facility] bariatric patients (and those external to [facility]) who have had bariatric surgery and are at least 3 months post-surgery.  WHEN: This support group meets the 4th Wednesday of the month from 11:00 AM - 12:00 PM virtually using Microsoft Teams.   FACILITATOR: Led by Certified Bariatric Nurse, Brianda Short RN.   TO REGISTER: Please send an email to Brianda at [email or web address] if you would like an invitation to the group and to learn about using Microsoft Teams.    2023 Meetings  April 26  May 24  Nayely 28  July 26  August 23  September 27  October 25  November 22  December 27      [facility]" "[facility]   Healthy Lifestyle Group    Healthy Lifestyle Group?  This is a 60 minute virtual coaching group for those who want to lead a healthier lifestyle. Come together to set goals and overcome barriers in a supportive group environment. We will address the four pillars of health--nutrition, exercise, sleep and emotional well-being.  This group is highly recommended for those who are participating in the 24 week Healthy Lifestyle Plan and our Health Coaching sessions.    WHEN: This group meets the first Friday of the month, 12:30 PM - 1:30 PM online, via a zoom meeting.      FACILITATOR: Led by National Board Certified Health and , Brianda Miller [facility].     TO REGISTER: Please call the Call Center at [phone] to register. You will get an appointment to attend in [facility]. Fifteen minutes prior to the meeting, complete the e-check in and you will get the link to join the meeting.  There is no charge to attend this group and space is limited.       2023 and 2024 Meeting Topics and Dates:  November 3: Introduction to Mindfulness (Learn simple and effective mindfulness practices and how it can benefit you)  December 8: Let's Talk (guided discussion on our wins and challenges)  January 5: New Years Vision: Manifest your Best 2024! (Guided imagery,  journaling and discussion)  February 2: Let's Talk  March 1: 10 Percent Happier by Juan Jose Munoz (Book Bites; a guided discussion on the nuggets of wisdom from favorite wellness books; no need to read the book but highly encouraged)  April 5: Let's Talk  May 3: \"Essentialism; The Disciplined Pursuit of Less by Glen Lara (book bites discussion)  June 7: Let's Talk  July 5: NO MEETING, off for the 4th of July Holiday  August 2: The Blue Zones, Secrets for Living a Longer Life by Juan Jose Hopkins (book bites discussion)                         "

## 2023-10-19 NOTE — PROGRESS NOTES
"Video-Visit Details    Type of service:  Video Visit    Video Start Time: 1:32 PM   Video End Time: 2:13 PM    Originating Location (pt. Location): Home    Distant Location (provider location): Offsite (providers home) [facility] [place]     Platform used for Video Visit: [employer]    New Weight Management Nutrition Consultation    Quan \"Danie\" FLORA Davidson is a 18 year old male presents today for new weight management nutrition consultation.  Patient referred by Nirmala Torre PA-C on October 19, 2023.    Patient with Co-morbidities of obesity including:      10/12/2023     9:22 AM   --   I have the following health issues associated with obesity Pre-Diabetes    High Blood Pressure   I have the following symptoms associated with obesity Knee Pain    Back Pain    Fatigue    Hip Pain   Only has one kidney.     Anthropometrics:  Estimated body mass index is 54.92 kg/m  as calculated from the following:    Height as of 10/17/23: 1.613 m (5' 3.5\").    Weight as of 10/17/23: 142.9 kg (315 lb).    Medications for Weight Loss:  None    NUTRITION HISTORY  Food allergies: None  Food intolerances: seafood  Vitamin/Mineral Supplements: None   Previous methods of diet modification for weight loss: Was 260lbs in high school. Was able to lose from 260lbs to 214lb wyatt year through going to the gym daily and low calorie diet. Was not sustainble through the pandemic, and regain weight   Doing 36 hr fast followed by 12 h eating window, consistently for a month. Lost ~10-15 lbs while following. 2.5 years ago, stopped drinking all calorie-containing beverages. Also cut out eating chips and most desserts.   Tracked intake over past 3 days in [place] called \"Nutricheck.\" Consuming 7571-8088 cole/day, protein:  gm, carb 192-220 gm.     Recent Diet Recall:  Breakfast: 8 am eggs; pancake and sausage or skip most days  Lunch: 12-1 pm chicken tinga; beef, red sauce, potatoes and rice; spaghetti mashed " potato and corn  Dinner: 7 pm left-overs from lunch; bowl of cereal; candy; ice cream   Snacks: apple, bananas   Beverages: Water, milk with cereal   Alcohol: none   Dining Out: 1-2 times per week fast-food ([employer])     On days he is fasting will drink lots of water. In general drinking about 1 gallon daily.           10/12/2023     9:22 AM   Diet Recall Review with Patient   If you do eat lunch, what types of food do you typically eat? some kind of protein with tortilla chips   If you do eat supper, what types of food do you typically eat? typically leftover of lunch   If you do snack, what types of food do you typically eat? sweets and chips or peanuts   How many glasses of juice do you drink in a typical day? 0   How many of glasses of milk do you drink in a typical day? 0   How many 8oz glasses of sugar containing drinks such as Leobardo-Aid/sweet tea do you drink in a day? 0   How many cans/bottles of sugar pop/soda/tea/sports drinks do you drink in a day? 0   How many cans/bottles of diet pop/soda/tea or sports drink do you drink in a day? 0   How often do you have a drink of alcohol? Never           10/12/2023     9:22 AM   Eating Habits   Generally, my meals include foods like these bread, pasta, rice, potatoes, corn, crackers, sweet dessert, pop, or juice Less Than Weekly   Generally, my meals include foods like these fried meats, brats, burgers, french fries, pizza, cheese, chips, or ice cream Once a Week   Eat fast food (like McDonalds, Aníbal Hackett, Taco Bell) Less Than Weekly   Eat at a buffet or sit-down restaurant Less Than Weekly   Eat most of my meals in front of the TV or computer A Few Times a Week   Often skip meals, eat at random times, have no regular eating times Everyday   Rarely sit down for a meal but snack or graze throughout Less Than Weekly   Eat extra snacks between meals A Few Times a Week   Eat most of my food at the end of the day Less Than Weekly   Eat in the middle of the  night or wake up at night to eat Never   Eat extra snacks to prevent or correct low blood sugar Never   Eat to prevent acid reflux or stomach pain Never   Worry about not having enough food to eat Never   I eat when I am depressed Never   I eat when I am stressed Never   I eat when I am bored Never   I eat when I am anxious Never   I eat when I am happy or as a reward A Few Times a Week   I feel hungry all the time even if I just have eaten Never   Feeling full is important to me Never   I finish all the food on my plate even if I am already full Less Than Weekly   I can't resist eating delicious food or walk past the good food/smell Never   I eat/snack without noticing that I am eating Never   I eat when I am preparing the meal Less Than Weekly   I eat more than usual when I see others eating Never   I have trouble not eating sweets, ice cream, cookies, or chips if they are around the house A Few Times a Week   I think about food all day Never   What foods, if any, do you crave? Chips/Crackers   Please list any other foods you crave? pastas or anything with tomato sauce           10/12/2023     9:22 AM   Amount of Food   I feel out of control when eating Never   I eat a large amount of food, like a loaf of bread, a box of cookies, a pint/quart of ice cream, all at once Never   I eat a large amount of food even when I am not hungry Never   I eat rapidly Never   I eat alone because I feel embarrassed and do not want others to see how much I have eaten Never   I eat until I am uncomfortably full Never   I feel bad, disgusted, or guilty after I overeat Almost Everyday       Physical Activity:  Pt describes as light. 3500 steps/day. Doing 100 push-up, sit-ups and curls.         10/12/2023     9:22 AM   Activity/Exercise History   How much of a typical 12 hour day do you spend sitting? Most of the Day   How much of a typical 12 hour day do you spend lying down? Less Than Half the Day   How much of a typical day do you  spend walking/standing? Half the Day   How many hours (not including work) do you spend on the TV/Video Games/Computer/Tablet/Phone? 6 Hours or More   How many times a week are you active for the purpose of exercise? 6-7 Times a Week   What keeps you from being more active? Unsure What To Do    Other   How many total minutes do you spend doing some activity for the purpose of exercising when you exercise? 15-30 Minutes       Nutrition Prescription  Recommended energy/nutrient modification.  2300 calories/day    Nutrition Diagnosis  Obesity r/t long history of positive energy balance aeb BMI >30.    Nutrition Intervention  Materials/education provided on hypocaloric diet for weight loss. Discussed risk for micronutrient and protein deficiency with long fasts, and possible negative effect on energy metabolism. Encouraged shorter fast like 16:8, eating 3 meals about 4 hours apart in eating window.  Discussed 2300 calorie/day diet, Volumetric eating to help satiety level on fewer calories; portion control and healthy food choices (Plate Method and Volumetrics handouts), 100 calorie snack choices, meal and snack planning and websites, and low/moderate consistent carb intake for mgmt of prediabetes. Patient demonstrates understanding.  Co-developed goals to work towards.   Provided pt with list of goals and resources below via [employer].     Expected Engagement: good  Follow-Up Plans:      Nutrition Goals  1) Follow 2300 calorie/day plan  2) At least 80 gm protein daily, at most 150 gm/day.   3) Limit carb to 60-75 gm per meal, and less than 15 gm per snack.   4) Follow 16:8 time-restricted eating. Fasting for 16 hours, 8 hour eating window daily.        The Plate Method:  Plate method can be used for general guidance on balanced meals/portion sizes (see link below for picture/more information)                 Make 1/2 your plate non starchy vegetables (cauliflower, broccoli, asparagus, [place] sprouts, lettuce, carrots,  for example).                3+ oz lean protein sources (salmon/skinless chicken/turkey breast/pork loin/lean cuts of beef/ or non-animal protein such as black, kidney or regalado beans, tofu/edamame/tempeh)    (Note: 3 oz = deck of cards size)                1/2 to 1 cup carbohydrate choices such as whole grain starches or starchy vegetables or fruit. For example: quinoa, brown rice, barley, potatoes, sweet potatoes, winter squash, peas, corn, or fruit.                Choose ~0-2 added fat servings at a meal (avocados, nut butters, nuts or seeds, olive oil, vegetable oils).    Http://www.[employer]/168270.pdf    https://[employer]/332140.pdf    https://www.cdc.gov/diabetes/images/managing/Diabetes-Manage-Eat-Well-Plate-Graphic_600px.jpg    Protein Sources   http://[employer]/254251.pdf     Carbohydrates  http://fvfiles.[email or web address]     Mindful Eating  http://[employer]/647026.pdf     Summary of Volumetrics Eating Plan  http://[email or web address]     Meal Replacement Shake Options:   *Protein Shake Criteria: no more than 210 Calories, at least 20 grams of protein, and less than 10 grams of sugar   [facility] smoothie (160 Calories, 20 g protein)   Premier Protein (160 Calories, 30 g protein)  Slim Fast Advanced Nutrition (180 Calories, 20 g protein)  Muscle Milk, lactose-free, 17 oz bottle (210 Calories, 30 g protein)  Integrated Supplements, no artificial sugars (110 Calories, 20 g protein)  Boost/Ensure Max (160 calories, 30 gm protein)   Fairlife Protein Shakes (160-230 calories, 26-42 gm protein)  Aldi's Elevation Protein Powder (180 calories, 30 gm protein)   Orgain Protein Shakes (130-160 calories, 20-26 gm protein)     Meal Replacement Bar Options:  [facility] Protein Shake (160 Calories, 15 g protein)  Quest Protein Bars (190 Calories, 20 g protein)  Built Bar (170 Calories, 15-20 g protein)  One Protein Bar (210 calories, 20 g protein)  Grossman Signature Protein Bar ([employer]) (190 Calories, 21 g  protein)  Pure Protein Bars (180 Calories, 21 g protein)    Low Calorie Frozen Meal:  Healthy Choice Power Bowls  Lean Cuisine  Smart Ones  Danilo Reddyrichelle Olivares    At Home Exercise Resources  ACE Fitness - Exercises by Muscle Group  https://www.acefitness.org/resources/everyone/exercise-library/    Plus Size Beginner Workout - Standing (low impact)  https://www.Vanna's Vanityube.com/watch?v=5MuDGMw1SMZ      Plus Size Full Body (created by a woman who struggled to find videos that were inclusive to larger bodies so she decided to make her own!)   https://www.Vanna[employer] Vanityube.com/watch?v=5UoAR9k-gw4     Standing Core Workout for Beginners   https://www.Vanna's Vanityube.com/watch?v=E68i2flcy07     Dance Workouts: The Fitness Liborio   https://www.SecondMarket.Nextbit Systems/user/[facility] Center[facility]    HIIT Workouts: PopSugar Fitness  https://www.SecondMarket.Nextbit Systems/user/popsugartvfit    Pilates: Blogilates  https://www.SecondMarket.[employer]bit Systems/user/blogilates    Yoga: Yoga with Suma   https://www.Vanna's Vanityube.com/user/yogawithadriene/featured    Strength Training: HASfit  https://www.SecondMarket.com/channel/IQFJM1-YWXRn29PB-w9JMahH    Handouts Relating to Exercise :    1.     Learning About Being Physically Active     2.      Muscle Conditionin Exercises    3.     Resistance Training with Free Weights: 3 Exercises    4.      Resistance Training with Surgical Tubin Exercises    5.     Stretchin Exercises    6.     Seated Exercises for Arms and Legs: 11 Exercises          Follow-Up:  1-2 months, PRN    Time spent with patient: 41 minutes.  Montserrat Nazario RD, LD

## 2023-10-19 NOTE — ASSESSMENT & PLAN NOTE
Overweight onset in childhood. Was 260lbs in high school. Was able to lose from 260lbs to 214lb wyatt year through going to the gym daily and low calorie diet. Was not sustainble through the pandemic, and regain weight. Weight also influenced by ETOH and drug use. Has been sober for the past 2 years. Highest weight in life of 330lbs. Recently abl to lose 15lbs through extreme intermittent fasting for 1.5 months. Has tried to lose weight in the past year through other diets, but did not see any results. Wants to lose weight to improve overall health.     Discussed current diet of intermittent fasting - 36 hour fast and 12 hours of eating. Was doing this for 1.5 months and lost weight, but did not find it sustainable long term. However, he has a goal to complete for a year to see continued weight loss. Discussed my concerns for how this long of a fast is unhealthy, especially long term. Also concerned for longevity and sustainability, like he has already seen - stopped fast so could enjoy meals with family. Will have further discussion with dietitian about more healthy and sustainable options. Also discussed all or nothing mentality and finding more middle group with diet and exercise in general.     Discussed that AOMs in the future could be an option. He would like to start with life style changes at this time. Will discuss AOMs in the future.

## 2023-10-19 NOTE — NURSING NOTE
Is the patient currently in the state of [place]? YES    Visit mode:VIDEO    If the visit is dropped, the patient can be reconnected by: VIDEO VISIT: Text to cell phone:   Telephone Information:   Mobile [phone]    and VIDEO VISIT: Send to e-mail at: [email or web address]    Will anyone else be joining the visit? NO  (If patient encounters technical issues they should call [phone]    How would you like to obtain your AVS? MyChart    Are changes needed to the allergy or medication list? No    Reason for visit: Consult    Steph VEGA

## 2023-10-19 NOTE — PATIENT INSTRUCTIONS
"Destin Prajapatiaac, it was nice to meet you today!  Thank you for allowing us the privilege of caring for you. We hope we provided you with the excellent service you deserve.   Please let us know if there is anything else we can do for you so that we can be sure you are completely satisfied with your care experience.    To ensure the quality of our services you may be receiving a patient satisfaction survey from an independent patient satisfaction monitoring company.    The greatest compliment you can give is a \"Likely to Recommend\"    Your visit was with OJ FERNANDEZ PA-C today.    Instructions per today's visit:     Will continue to work on life style changes at this time. Can consider weight loss medications in the future.   GILDARDO Mariano on 10/19/23 - discuss sustainable diets   Follow up with Oj Nelson in 3 months     ___________________________________________________________________________  Important contact and scheduling information:  Please call our contact center at [phone] to schedule your next appointments.  For any nursing questions or concerns call Rhina Nava LPN at [phone] or Radha Braun RN at [phone]  Please call during clinic hours Monday through Friday 8:00a - 4:00p if you have questions or you can contact us via [employer] at anytime and we will reply during clinic hours.    Lab results will be communicated through My Chart or letter (if My Chart not used). Please call the clinic if you have not received communication after 1 week or if you have any questions.?  Clinic Fax: [phone]  __________________________________________________________________________    If labs were ordered today:    Please make an appointment to have them drawn at your convenience.     To schedule the Lab Appointment using [employer]:  Select \"Schedule an Appointment\"  Select \"Lab Only\"  For \"A couple of questions\", select \"Other\"  For \"Which locations work for you?, select the location and set up the " appointment    To schedule by phone call [phone] to schedule a lab only appointment at any [facility] lab.  ___________________________________________________________________________  Work with [facility] !  Virtual Sessions are Available through [facility]    To learn more, call to schedule a free, Health  Q&A appointment: [phone]     What is Health Coaching?  Do you know what you are supposed to do, but you just aren't doing it?  Then, HEALTH COACHING may help you!   Get unstuck and move forward with the support of a professionally trained NBC-HWC (National Board-Certified Health and ) who uses evidence-based approaches to help you move forward with healthy lifestyle changes in the areas of weight loss, stress management and overall well-being.    Health Coaches help you identify goals that will work best for you. Health Coaches provide support and encouragement with overcoming barriers and help you to find inspiration and motivation to lead a healthy lifestyle.    Option one:  Health Coaching 3-Pack; Three, 30-minute Health Coaching Visits, for $99  Visits are done virtually (phone or video)  This is a self pay service; we do not accept insurance for diego coaching.    Option two:   The 24 week Plan; 11 Health Coaching Visits, and a 7 months subscription to [employer]-- on-demand fitness, nutrition and mindfulness classes, for $499 (employee discounts may be available). Participants will also meet regularly with a weight management Medical Provider and a Registered/Licensed Dietician.  This is a self-pay service; we do not accept insurance for health coaching.    To Schedule a free Health  Q&A appointment to learn more,  call [phone].  ____________________________________________________________________  [facility]  Healthy Lifestyle Group    Healthy Lifestyle Group  This is a 60 minute  "virtual coaching group for those who want to lead a healthier lifestyle. Come together to set goals and overcome barriers in a supportive group environment. We will address the four pillars of health--nutrition, exercise, sleep and emotional well-being.  This group is highly recommended for those who are participating in the 24 week Healthy Lifestyle Plan and our Health Coaching sessions.    WHEN: This group meets the first Friday of the month, 12:30 PM - 1:30 PM online, via a zoom meeting.      FACILITATOR: Led by National Board Certified Health and , Brianda Miller, [facility].    TO REGISTER: Please call the Call Center at [phone] to register. You will get an appointment to attend in [employer]. Fifteen minutes prior to the meeting, complete the e-check in and you will get the link to join the meeting.  There is no charge to attend this group and space is limited.      2023 and 2024 Meeting Topics and Dates:    November 3: Introduction to Mindfulness (Learn simple and effective mindfulness practices and how it can benefit you)    December 8: Let's Talk (guided discussion on our wins and challenges)    January 5: New Years Vision: Manifest your Best 2024! (Guided imagery,  journaling and discussion)    February 2: Let's Talk    March 1: 10 Percent Happier by Juan Jose Munoz (Book Bites; a guided discussion on the nuggets of wisdom from favorite wellness books; no need to read the book but highly encouraged)    April 5: Let's Talk    May 3: \"Essentialism; The Disciplined Pursuit of Less by Glen Lara (book bites discussion)    June 7: Let's Talk    July 5: NO MEETING, off for the 4th of July Holiday    August 2: The Blue Zones, Secrets for Living a Longer Life by Juan Jose Hopkins (book bites discussion)      If you would like bariatric surgery specific support group info please let your care team know.         Thank you,   [facility] Comprehensive Weight Management Team                          "

## 2023-10-19 NOTE — LETTER
"10/19/2023       RE: Quan Davidson  [address]  [place] [address]     Dear Colleague,    Thank you for referring your patient, Quan Davidson, to the [facility] WEIGHT MANAGEMENT [facility]INIC [place] at [facility]. Please see a copy of my visit note below.    Video-Visit Details    Type of service:  Video Visit    Video Start Time: 1:32 PM   Video End Time: 2:13 PM    Originating Location (pt. Location): Home    Distant Location (provider location): Offsite (providers home) [facility] [place]     Platform used for Video Visit: [email or web address]    New Weight Management Nutrition Consultation    Quan \"Danie\" FLORA Davidson is a 18 year old male presents today for new weight management nutrition consultation.  Patient referred by Nirmala Torre PA-C on October 19, 2023.    Patient with Co-morbidities of obesity including:      10/12/2023     9:22 AM   --   I have the following health issues associated with obesity Pre-Diabetes    High Blood Pressure   I have the following symptoms associated with obesity Knee Pain    Back Pain    Fatigue    Hip Pain   Only has one kidney.     Anthropometrics:  Estimated body mass index is 54.92 kg/m  as calculated from the following:    Height as of 10/17/23: 1.613 m (5' 3.5\").    Weight as of 10/17/23: 142.9 kg (315 lb).    Medications for Weight Loss:  None    NUTRITION HISTORY  Food allergies: None  Food intolerances: seafood  Vitamin/Mineral Supplements: None   Previous methods of diet modification for weight loss: Was 260lbs in high school. Was able to lose from 260lbs to 214lb wyatt year through going to the gym daily and low calorie diet. Was not sustainble through the pandemic, and regain weight   Doing 36 hr fast followed by 12 h eating window, consistently for a month. Lost ~10-15 lbs while following. 2.5 years ago, stopped drinking all calorie-containing beverages. " "Also cut out eating chips and most desserts.   Tracked intake over past 3 days in guilherme called \"Nutricheck.\" Consuming 1147-6675 cole/day, protein:  gm, carb 192-220 gm.     Recent Diet Recall:  Breakfast: 8 am eggs; pancake and sausage or skip most days  Lunch: 12-1 pm chicken tinga; beef, red sauce, potatoes and rice; spaghetti mashed potato and corn  Dinner: 7 pm left-overs from lunch; bowl of cereal; candy; ice cream   Snacks: apple, bananas   Beverages: Water, milk with cereal   Alcohol: none   Dining Out: 1-2 times per week fast-food ([employer])     On days he is fasting will drink lots of water. In general drinking about 1 gallon daily.           10/12/2023     9:22 AM   Diet Recall Review with Patient   If you do eat lunch, what types of food do you typically eat? some kind of protein with tortilla chips   If you do eat supper, what types of food do you typically eat? typically leftover of lunch   If you do snack, what types of food do you typically eat? sweets and chips or peanuts   How many glasses of juice do you drink in a typical day? 0   How many of glasses of milk do you drink in a typical day? 0   How many 8oz glasses of sugar containing drinks such as Leobardo-Aid/sweet tea do you drink in a day? 0   How many cans/bottles of sugar pop/soda/tea/sports drinks do you drink in a day? 0   How many cans/bottles of diet pop/soda/tea or sports drink do you drink in a day? 0   How often do you have a drink of alcohol? Never           10/12/2023     9:22 AM   Eating Habits   Generally, my meals include foods like these bread, pasta, rice, potatoes, corn, crackers, sweet dessert, pop, or juice Less Than Weekly   Generally, my meals include foods like these fried meats, brats, burgers, french fries, pizza, cheese, chips, or ice cream Once a Week   Eat fast food (like McDonalds, Burger Lew, Taco Bell) Less Than Weekly   Eat at a buffet or sit-down restaurant Less Than Weekly   Eat most of my meals in " front of the TV or computer A Few Times a Week   Often skip meals, eat at random times, have no regular eating times Everyday   Rarely sit down for a meal but snack or graze throughout Less Than Weekly   Eat extra snacks between meals A Few Times a Week   Eat most of my food at the end of the day Less Than Weekly   Eat in the middle of the night or wake up at night to eat Never   Eat extra snacks to prevent or correct low blood sugar Never   Eat to prevent acid reflux or stomach pain Never   Worry about not having enough food to eat Never   I eat when I am depressed Never   I eat when I am stressed Never   I eat when I am bored Never   I eat when I am anxious Never   I eat when I am happy or as a reward A Few Times a Week   I feel hungry all the time even if I just have eaten Never   Feeling full is important to me Never   I finish all the food on my plate even if I am already full Less Than Weekly   I can't resist eating delicious food or walk past the good food/smell Never   I eat/snack without noticing that I am eating Never   I eat when I am preparing the meal Less Than Weekly   I eat more than usual when I see others eating Never   I have trouble not eating sweets, ice cream, cookies, or chips if they are around the house A Few Times a Week   I think about food all day Never   What foods, if any, do you crave? Chips/Crackers   Please list any other foods you crave? pastas or anything with tomato sauce           10/12/2023     9:22 AM   Amount of Food   I feel out of control when eating Never   I eat a large amount of food, like a loaf of bread, a box of cookies, a pint/quart of ice cream, all at once Never   I eat a large amount of food even when I am not hungry Never   I eat rapidly Never   I eat alone because I feel embarrassed and do not want others to see how much I have eaten Never   I eat until I am uncomfortably full Never   I feel bad, disgusted, or guilty after I overeat Almost Everyday       Physical  Activity:  Pt describes as light. 3500 steps/day. Doing 100 push-up, sit-ups and curls.         10/12/2023     9:22 AM   Activity/Exercise History   How much of a typical 12 hour day do you spend sitting? Most of the Day   How much of a typical 12 hour day do you spend lying down? Less Than Half the Day   How much of a typical day do you spend walking/standing? Half the Day   How many hours (not including work) do you spend on the TV/Video Games/Computer/Tablet/Phone? 6 Hours or More   How many times a week are you active for the purpose of exercise? 6-7 Times a Week   What keeps you from being more active? Unsure What To Do    Other   How many total minutes do you spend doing some activity for the purpose of exercising when you exercise? 15-30 Minutes       Nutrition Prescription  Recommended energy/nutrient modification.  2300 calories/day    Nutrition Diagnosis  Obesity r/t long history of positive energy balance aeb BMI >30.    Nutrition Intervention  Materials/education provided on hypocaloric diet for weight loss. Discussed risk for micronutrient and protein deficiency with long fasts, and possible negative effect on energy metabolism. Encouraged shorter fast like 16:8, eating 3 meals about 4 hours apart in eating window.  Discussed 2300 calorie/day diet, Volumetric eating to help satiety level on fewer calories; portion control and healthy food choices (Plate Method and Volumetrics handouts), 100 calorie snack choices, meal and snack planning and websites, and low/moderate consistent carb intake for mgmt of prediabetes. Patient demonstrates understanding.  Co-developed goals to work towards.   Provided pt with list of goals and resources below via InCytut.     Expected Engagement: good  Follow-Up Plans:      Nutrition Goals  1) Follow 2300 calorie/day plan  2) At least 80 gm protein daily, at most 150 gm/day.   3) Limit carb to 60-75 gm per meal, and less than 15 gm per snack.   4) Follow 16:8 time-restricted  eating. Fasting for 16 hours, 8 hour eating window daily.        The Plate Method:  Plate method can be used for general guidance on balanced meals/portion sizes (see link below for picture/more information)                 Make 1/2 your plate non starchy vegetables (cauliflower, broccoli, asparagus, [place] sprouts, lettuce, carrots, for example).                3+ oz lean protein sources (salmon/skinless chicken/turkey breast/pork loin/lean cuts of beef/ or non-animal protein such as black, kidney or regalado beans, tofu/edamame/tempeh)    (Note: 3 oz = deck of cards size)                1/2 to 1 cup carbohydrate choices such as whole grain starches or starchy vegetables or fruit. For example: quinoa, brown rice, barley, potatoes, sweet potatoes, winter squash, peas, corn, or fruit.                Choose ~0-2 added fat servings at a meal (avocados, nut butters, nuts or seeds, olive oil, vegetable oils).    Http://www.Baolab [email or web address].pdf    https://Ba[employer]systems/836169.pdf    https://www.cdc.gov/diabetes/images/managing/Diabetes-Manage-Eat-Well-Plate-Graphic_600px.jpg    Protein Sources   http://Ba[employer]/379082.pdf     Carbohydrates  http://[email or web address]     Mindful Eating  http://Baolab [employer]systems/[email or web address]     Summary of Volumetrics Eating Plan  [email or web address]     Meal Replacement Shake Options:   *Protein Shake Criteria: no more than 210 Calories, at least 20 grams of protein, and less than 10 grams of sugar   [facility] smoothie (160 Calories, 20 g protein)   Premier Protein (160 Calories, 30 g protein)  Slim Fast Advanced Nutrition (180 Calories, 20 g protein)  Muscle Milk, lactose-free, 17 oz bottle (210 Calories, 30 g protein)  Integrated Supplements, no artificial sugars (110 Calories, 20 g protein)  Boost/Ensure Max (160 calories, 30 gm protein)   Fairlife Protein Shakes (160-230 calories, 26-42 gm protein)  Aldi's Elevation Protein Powder (180 calories, 30 gm protein)   Orgain  Protein Shakes (130-160 calories, 20-26 gm protein)     Meal Replacement Bar Options:   [facility] Protein Shake (160 Calories, 15 g protein)  Quest Protein Bars (190 Calories, 20 g protein)  Built Bar (170 Calories, 15-20 g protein)  One Protein Bar (210 calories, 20 g protein)  Grossman Signature Protein Bar ([employer]) (190 Calories, 21 g protein)  Pure Protein Bars (180 Calories, 21 g protein)    Low Calorie Frozen Meal:  Healthy Choice Power Bowls  Lean Marisolisine  Smart Ones  Danilo Olivares    At Home Exercise Resources  ACE Fitness - Exercises by Muscle Group  https://www.aceFingoness.org/resources/everyone/exercise-library/    Plus Size Beginner Workout - Standing (low impact)  https://www.Gustube.com/watch?v=0IqYDHw9COU      Plus Size Full Body (created by a woman who struggled to find videos that were inclusive to larger bodies so she decided to make her own!)   https://www.Gustube.com/watch?v=4OnTO7f-ce9     Standing Core Workout for Beginners   https://www.Gustube.com/watch?v=W69d1nert16     Dance Workouts: The Fitness Liborio   https://www.Gustube.com/user/TheDuke University Hospitalall    HIIT Workouts: PopSugar Fitness  https://www.KnowledgeVision.Antares Energy/user/popsugartvfit    Pilates: Blogilates  https://www.Gustube.Antares Energy/user/blogilates    Yoga: Yoga with Suma   https://www.KnowledgeVision.com/user/yogawithadriene/featured    Strength Training: HASfit  https://www.KnowledgeVision.com/channel/DLMOF0-SRNEl80PB-j5AKhiQ    Handouts Relating to Exercise :    1.     Learning About Being Physically Active     2.      Muscle Conditionin Exercises    3.     Resistance Training with Free Weights: 3 Exercises    4.      Resistance Training with Surgical Tubin Exercises    5.     Stretchin Exercises    6.     Seated Exercises for Arms and Legs: 11 Exercises          Follow-Up:  1-2 months, PRN    Time spent with patient: 41 minutes.  Montserrat Nazario RD, LD        Again, thank you for allowing me to participate in the care of your patient.       Sincerely,    Montserrat Nazario RD

## 2023-10-25 ENCOUNTER — PATIENT OUTREACH (OUTPATIENT)
Dept: CARE COORDINATION | Facility: CLINIC | Age: 18
End: 2023-10-25
Payer: COMMERCIAL

## 2023-10-25 NOTE — PROGRESS NOTES
Clinic Care Coordination Contact  Community Health Worker Follow Up    Care Gaps:     Health Maintenance Due   Topic Date Due    HEPATITIS B IMMUNIZATION (1 of 3 - 3-dose series) Never done    COVID-19 Vaccine (1) Never done    HIV SCREENING  Never done    HEPATITIS C SCREENING  Never done    HPV IMMUNIZATION (2 - Risk male 3-dose series) 10/24/2023    DTAP/TDAP/TD IMMUNIZATION (2 - Td or Tdap) 10/24/2023       Care Gaps Last addressed on 10/25/2023    Care Plan:   Care Plan: General       Problem: HP GENERAL PROBLEM       Long-Range Goal: I would like to work with the Bariatric Clinic to assist me with weight loss.       Start Date: 6/28/2023 Expected End Date: 9/28/2023    This Visit's Progress: 20% Recent Progress: 10%    Priority: High    Note:     Barriers: elevated BMI  Strengths: strong advocate for himself.  Patient expressed understanding of goal: yes  Action steps to achieve this goal:  1. I understand Dr. Louis has sent  referral to the Bariatric Clinic on my behalf.   2. I understand a representative from the Bariatric Clinic will contact me directly to scheduled an intake appointment.   3. I will report progress towards this goal at scheduled outreach telephone calls from my Care Coordination team.      Discussed - completed virtual visit  Discussed on 8/2/23                              Intervention and Education during outreach: Supportive Listening and Encouragement.    CHW spoke to Quan today for monthly CC outreach call, patient states he is doing well and denies any questions or concerns at this time.  CHW and patient discussed current CC goal, patient informed CHW that he has completed two virtual visits for weight mgmt and has follow up appointment scheduled on 12/15/2023    Next PCP appointment is scheduled- 11/28/2023    CHW Plan: Continue with monthly outreach call to discuss, support and update CC goal progression    Next CHW outreach call: 11/22/2023    Siri EVANS  Community Health Worker  [place]  [facility] Care Coordination  [facility], [email or web address]  Office: [phone]

## 2023-10-30 ENCOUNTER — TELEPHONE (OUTPATIENT)
Dept: OPHTHALMOLOGY | Facility: CLINIC | Age: 18
End: 2023-10-30
Payer: COMMERCIAL

## 2023-10-30 NOTE — TELEPHONE ENCOUNTER
Spoke with patient's family member confirming appointment on 11/2/23. Confirmed appointment. -Per Patient's Family member

## 2023-11-08 ENCOUNTER — TELEPHONE (OUTPATIENT)
Dept: FAMILY MEDICINE | Facility: CLINIC | Age: 18
End: 2023-11-08
Payer: COMMERCIAL

## 2023-11-08 NOTE — TELEPHONE ENCOUNTER
Patient sister calling to ask if the letter that was talked about for her brother to get a free [employer] Membership has been done     Writer found info on this but couldn't find the letter/prescription       Return in about 4 weeks (around 7/24/2023) for routine physical.  Give me the day to write an exercise prescription for you!          Call Back to address    Call Back  Contact Information  [phone] (Mobile)

## 2023-11-10 NOTE — TELEPHONE ENCOUNTER
Written exercise prescription, copy to be scanned into chart. Patient can pick it up.     NACHO AGUILAR, DO

## 2023-11-16 ENCOUNTER — VIRTUAL VISIT (OUTPATIENT)
Dept: ENDOCRINOLOGY | Facility: CLINIC | Age: 18
End: 2023-11-16
Payer: COMMERCIAL

## 2023-11-16 VITALS — BODY MASS INDEX: 53.78 KG/M2 | WEIGHT: 315 LBS | HEIGHT: 64 IN

## 2023-11-16 DIAGNOSIS — E66.01 CLASS 3 SEVERE OBESITY WITH SERIOUS COMORBIDITY AND BODY MASS INDEX (BMI) OF 50.0 TO 59.9 IN ADULT, UNSPECIFIED OBESITY TYPE (H): Primary | ICD-10-CM

## 2023-11-16 DIAGNOSIS — E66.813 CLASS 3 SEVERE OBESITY WITH SERIOUS COMORBIDITY AND BODY MASS INDEX (BMI) OF 50.0 TO 59.9 IN ADULT, UNSPECIFIED OBESITY TYPE (H): Primary | ICD-10-CM

## 2023-11-16 PROCEDURE — 99213 OFFICE O/P EST LOW 20 MIN: CPT | Mod: 95

## 2023-11-16 ASSESSMENT — PAIN SCALES - GENERAL: PAINLEVEL: NO PAIN (0)

## 2023-11-16 NOTE — LETTER
2023       RE: Quan Davidson  [address]  [place] [address]     Dear Colleague,    Thank you for referring your patient, Quan Davidson, to the [facility] WEIGHT MANAGEMENT CLINIC [place] at [facility]. Please see a copy of my visit note below.      Return Medical Weight Management Note     Quan Davidson  MRN:  [id number]  :  2005  RINA:  2023    Dear NACHO AGUILAR DO,    I had the pleasure of seeing your patient Quan Davidson. He is a 18 year old male who I am continuing to see for treatment of obesity related to:        10/12/2023     9:22 AM   --   I have the following health issues associated with obesity Pre-Diabetes    High Blood Pressure   I have the following symptoms associated with obesity Knee Pain    Back Pain    Fatigue    Hip Pain       Assessment & Plan  Problem List Items Addressed This Visit       Class 3 severe obesity with serious comorbidity and body mass index (BMI) of 50.0 to 59.9 in adult (H) - Primary     First seen 10/17/2023 for new MWM. Since then has restarted intermittent fasting, but as extreme as before. Since then has increase fatigue and cravings, and found this no longer sustainable for weight loss. Would like to discuss AOMs to day.     AOM discussed:   - Phentermine currently contraindicated with HTN, and BP not at gaol of <140/80. Can consider in the future when BP at goal.   - Topiramate - concern for hx of nephrectomy, currently only has 1 working kidney. GFR  >90. Would need to discuss with nephrology prior to starting   - Naltrexone - can consider in the future. No contraindications. No hx of liver disease. Not taking opioids.   - GLP-1 to be started today. No contraindications. Discussed side effects, risks, and benefits. No hx of pancreatitis. No personal or family hx of MTC or MENII. Will start Saxenda. Goal to transition to Wegovy or Zepbound in  the future.          Relevant Medications    insulin pen needle (31G X 5 MM) 31G X 5 MM miscellaneous    liraglutide - Weight Management (SAXENDA) 18 MG/3ML pen    Other Relevant Orders    Med Therapy Management Referral        Start Saxenda ramp up to 3.0mg once daily. If tolerating and approve, will transition to Wegovy as needed.   Follow up with Lauren Bloch, MTM Pharmacist in 6 weeks  Follow up with Nirmala Nelson in 3 months       INTERVAL HISTORY:  New MWM on 10/17/2023  Started intermittent fasting  No interested in starting AOM at that time       Since last visit started a shorter intermittent fasting as advised by GILDARDO Mariano. But, started to heaving increase fatigue and cravings. Realized this was no longer working. Would like to start a AOM today to help with weight loss.       CURRENT WEIGHT:   317 lbs 0 oz    Initial Weight (lbs): 315 lbs  Last Visits Weight: 141.7 kg (312 lb 8 oz)  Cumulative weight loss (lbs): -2  Weight Loss Percentage: -0.63%         No data to display                  MEDICATIONS:   Current Outpatient Medications   Medication Sig Dispense Refill    cetirizine (ZYRTEC) 10 MG tablet Take 1 tablet (10 mg) by mouth daily 90 tablet 3    fluorometholone (FML LIQUIFILM) 0.1 % ophthalmic suspension Place 1 drop into both eyes 2 times daily 5 mL 0    insulin pen needle (31G X 5 MM) 31G X 5 MM miscellaneous Use Saxenda pen needles daily or as directed. 100 each 1    liraglutide - Weight Management (SAXENDA) 18 MG/3ML pen Week 1: 0.6 mg subcutaneous daily, Week 2: 1.2 mg daily, Week 3: 1.8 mg daily, Week 4: 2.4 mg daily, Week 5 & on: 3 mg daily 15 mL 1    olopatadine (PATADAY) 0.2 % ophthalmic solution Place 0.05 mLs (1 drop) into both eyes daily 2.5 mL 4    desonide (DESOWEN) 0.05 % external ointment Apply topically 2 times daily 60 g 1    doxycycline monohydrate (ADOXA) 50 MG tablet Take 1 tablet (50 mg) by mouth 2 times daily 120 tablet 0    tacrolimus (PROTOPIC) 0.1 % external ointment Apply  "topically 2 times daily 60 g 1            No data to display                  Objective   Ht 1.626 m (5' 4\")   Wt 143.8 kg (317 lb)   BMI 54.41 kg/m             PHYSICAL EXAM:    GENERAL: Healthy, alert and no distress  EYES: Eyes grossly normal to inspection.  No discharge or erythema, or obvious scleral/conjunctival abnormalities.  RESP: No audible wheeze, cough, or visible cyanosis.  No visible retractions or increased work of breathing.    SKIN: Visible skin clear. No significant rash, abnormal pigmentation or lesions.  NEURO: Cranial nerves grossly intact.  Mentation and speech appropriate for age.  PSYCH: Mentation appears normal, affect normal/bright, judgement and insight intact, normal speech and appearance well-groomed.        Sincerely,    OJ FERNANDEZ PA-C      20 minutes spent by me on the date of the encounter doing chart review, history and exam, documentation and further activities per the note  "

## 2023-11-16 NOTE — PROGRESS NOTES
Return Medical Weight Management Note     Quan FLORA Davidson  MRN:  [id number]  :  2005  RINA:  2023    Dear NACHO AGUILAR DO,    I had the pleasure of seeing your patient Quan FLORA Davidson. He is a 18 year old male who I am continuing to see for treatment of obesity related to:        10/12/2023     9:22 AM   --   I have the following health issues associated with obesity Pre-Diabetes    High Blood Pressure   I have the following symptoms associated with obesity Knee Pain    Back Pain    Fatigue    Hip Pain       Assessment & Plan   Problem List Items Addressed This Visit       Class 3 severe obesity with serious comorbidity and body mass index (BMI) of 50.0 to 59.9 in adult (H) - Primary     First seen 10/17/2023 for new MWM. Since then has restarted intermittent fasting, but as extreme as before. Since then has increase fatigue and cravings, and found this no longer sustainable for weight loss. Would like to discuss AOMs to day.     AOM discussed:   - Phentermine currently contraindicated with HTN, and BP not at gaol of <140/80. Can consider in the future when BP at goal.   - Topiramate - concern for hx of nephrectomy, currently only has 1 working kidney. GFR  >90. Would need to discuss with nephrology prior to starting   - Naltrexone - can consider in the future. No contraindications. No hx of liver disease. Not taking opioids.   - GLP-1 to be started today. No contraindications. Discussed side effects, risks, and benefits. No hx of pancreatitis. No personal or family hx of MTC or MENII. Will start Saxenda. Goal to transition to Wegovy or Zepbound in the future.          Relevant Medications    insulin pen needle (31G X 5 MM) 31G X 5 MM miscellaneous    liraglutide - Weight Management (SAXENDA) 18 MG/3ML pen    Other Relevant Orders    Med Therapy Management Referral        Start Saxenda ramp up to 3.0mg once daily. If tolerating and approve, will transition to Wegovy as needed.  "  Follow up with Lauren Bloch, MTEUGENIO Pharmacist in 6 weeks  Follow up with Nirmala Nelson in 3 months       INTERVAL HISTORY:  New MWM on 10/17/2023  Started intermittent fasting  No interested in starting AOM at that time       Since last visit started a shorter intermittent fasting as advised by GILDARDO Mariano. But, started to heaving increase fatigue and cravings. Realized this was no longer working. Would like to start a AOM today to help with weight loss.       CURRENT WEIGHT:   317 lbs 0 oz    Initial Weight (lbs): 315 lbs  Last Visits Weight: 141.7 kg (312 lb 8 oz)  Cumulative weight loss (lbs): -2  Weight Loss Percentage: -0.63%         No data to display                  MEDICATIONS:   Current Outpatient Medications   Medication Sig Dispense Refill    cetirizine (ZYRTEC) 10 MG tablet Take 1 tablet (10 mg) by mouth daily 90 tablet 3    fluorometholone (FML LIQUIFILM) 0.1 % ophthalmic suspension Place 1 drop into both eyes 2 times daily 5 mL 0    insulin pen needle (31G X 5 MM) 31G X 5 MM miscellaneous Use Saxenda pen needles daily or as directed. 100 each 1    liraglutide - Weight Management (SAXENDA) 18 MG/3ML pen Week 1: 0.6 mg subcutaneous daily, Week 2: 1.2 mg daily, Week 3: 1.8 mg daily, Week 4: 2.4 mg daily, Week 5 & on: 3 mg daily 15 mL 1    olopatadine (PATADAY) 0.2 % ophthalmic solution Place 0.05 mLs (1 drop) into both eyes daily 2.5 mL 4    desonide (DESOWEN) 0.05 % external ointment Apply topically 2 times daily 60 g 1    doxycycline monohydrate (ADOXA) 50 MG tablet Take 1 tablet (50 mg) by mouth 2 times daily 120 tablet 0    tacrolimus (PROTOPIC) 0.1 % external ointment Apply topically 2 times daily 60 g 1            No data to display                  Objective    Ht 1.626 m (5' 4\")   Wt 143.8 kg (317 lb)   BMI 54.41 kg/m             PHYSICAL EXAM:    GENERAL: Healthy, alert and no distress  EYES: Eyes grossly normal to inspection.  No discharge or erythema, or obvious scleral/conjunctival " abnormalities.  RESP: No audible wheeze, cough, or visible cyanosis.  No visible retractions or increased work of breathing.    SKIN: Visible skin clear. No significant rash, abnormal pigmentation or lesions.  NEURO: Cranial nerves grossly intact.  Mentation and speech appropriate for age.  PSYCH: Mentation appears normal, affect normal/bright, judgement and insight intact, normal speech and appearance well-groomed.        Sincerely,    OJ FERNANDEZ PA-C      20 minutes spent by me on the date of the encounter doing chart review, history and exam, documentation and further activities per the note

## 2023-11-16 NOTE — NURSING NOTE
Is the patient currently in the state of [place]? YES    Visit mode:VIDEO    If the visit is dropped, the patient can be reconnected by: VIDEO VISIT: Text to cell phone:   Telephone Information:   Mobile [phone]       Will anyone else be joining the visit? NO  (If patient encounters technical issues they should call [phone]    How would you like to obtain your AVS? MyChart    Are changes needed to the allergy or medication list? No    Reason for visit: RECHECK    Bobbilynn Grossaint VVF

## 2023-11-17 ENCOUNTER — ALLIED HEALTH/NURSE VISIT (OUTPATIENT)
Dept: OPHTHALMOLOGY | Facility: CLINIC | Age: 18
End: 2023-11-17
Payer: COMMERCIAL

## 2023-11-17 ENCOUNTER — OFFICE VISIT (OUTPATIENT)
Dept: OPTOMETRY | Facility: CLINIC | Age: 18
End: 2023-11-17
Payer: COMMERCIAL

## 2023-11-17 DIAGNOSIS — Z97.3 WEARS CONTACT LENSES: Primary | ICD-10-CM

## 2023-11-17 DIAGNOSIS — H52.203 HIGH DEGREE OF ASTIGMATISM IN BOTH EYES: Primary | ICD-10-CM

## 2023-11-17 DIAGNOSIS — H53.143 ASTHENOPIA OF BOTH EYES: ICD-10-CM

## 2023-11-17 DIAGNOSIS — H52.13 SEVERE MYOPIA OF BOTH EYES: ICD-10-CM

## 2023-11-17 PROCEDURE — 99207 PR NO CHARGE LOS: CPT

## 2023-11-19 NOTE — PATIENT INSTRUCTIONS
"Destin Helton,   Thank you for allowing us the privilege of caring for you. We hope we provided you with the excellent service you deserve.   Please let us know if there is anything else we can do for you so that we can be sure you are completely satisfied with your care experience.    To ensure the quality of our services you may be receiving a patient satisfaction survey from an independent patient satisfaction monitoring company.    The greatest compliment you can give is a \"Likely to Recommend\"    Your visit was with OJ FERNANDEZ PA-C today.    Instructions per today's visit:     Start Saxenda ramp up to 3.0mg once daily. If tolerating and approve, will transition to Wegovy as needed.   Follow up with Lauren Bloch, MTM Pharmacist in 6 weeks  Follow up with Oj Nelson in 3 months     ___________________________________________________________________________  Important contact and scheduling information:  Please call our contact center at [phone] to schedule your next appointments.  For any nursing questions or concerns call Rhina Nava LPN at [phone] or Radha Braun RN at [phone]  Please call during clinic hours Monday through Friday 8:00a - 4:00p if you have questions or you can contact us via [employer] at anytime and we will reply during clinic hours.    Lab results will be communicated through My Chart or letter (if My Chart not used). Please call the clinic if you have not received communication after 1 week or if you have any questions.?  Clinic Fax: [phone]  __________________________________________________________________________    If labs were ordered today:    Please make an appointment to have them drawn at your convenience.     To schedule the Lab Appointment using [employer]:  Select \"Schedule an Appointment\"  Select \"Lab Only\"  For \"A couple of questions\", select \"Other\"  For \"Which locations work for you?, select the location and set up the appointment    To schedule by phone call " [phone] to schedule a lab only appointment at any [facility] lab.  ___________________________________________________________________________  Work with A Health !  Virtual Sessions are Available through [facility] Weight Management Clinics    To learn more, call to schedule a free, Health  Q&A appointment: [phone]     What is Health Coaching?  Do you know what you are supposed to do, but you just aren't doing it?  Then, HEALTH COACHING may help you!   Get unstuck and move forward with the support of a professionally trained NBC-HWC (National Board-Certified Health and ) who uses evidence-based approaches to help you move forward with healthy lifestyle changes in the areas of weight loss, stress management and overall well-being.    Health Coaches help you identify goals that will work best for you. Health Coaches provide support and encouragement with overcoming barriers and help you to find inspiration and motivation to lead a healthy lifestyle.    Option one:  Health Coaching 3-Pack; Three, 30-minute Health Coaching Visits, for $99  Visits are done virtually (phone or video)  This is a self pay service; we do not accept insurance for diego coaching.    Option two:   The 24 week Plan; 11 Health Coaching Visits, and a 7 months subscription to [employer] (NIH)-- on-demand fitness, nutrition and mindfulness classes, for $499 (employee discounts may be available). Participants will also meet regularly with a weight management Medical Provider and a Registered/Licensed Dietician.  This is a self-pay service; we do not accept insurance for health coaching.    To Schedule a free Health  Q&A appointment to learn more,  call [phone].  ____________________________________________________________________  [facility]  Healthy Lifestyle Group    Healthy Lifestyle Group  This is a 60 minute virtual coaching group for those who want  "to lead a healthier lifestyle. Come together to set goals and overcome barriers in a supportive group environment. We will address the four pillars of health--nutrition, exercise, sleep and emotional well-being.  This group is highly recommended for those who are participating in the 24 week Healthy Lifestyle Plan and our Health Coaching sessions.    WHEN: This group meets the first Friday of the month, 12:30 PM - 1:30 PM online, via a zoom meeting.      FACILITATOR: Led by National Board Certified Health and , Brianda Miller [facility].    TO REGISTER: Please call the Call Center at [phone] to register. You will get an appointment to attend in [employer]. Fifteen minutes prior to the meeting, complete the e-check in and you will get the link to join the meeting.  There is no charge to attend this group and space is limited.      2023 and 2024 Meeting Topics and Dates:    November 3: Introduction to Mindfulness (Learn simple and effective mindfulness practices and how it can benefit you)    December 8: Let's Talk (guided discussion on our wins and challenges)    January 5: New Years Vision: Manifest your Best 2024! (Guided imagery,  journaling and discussion)    February 2: Let's Talk    March 1: 10 Percent Happier by Juan Jose Munoz (Book Bites; a guided discussion on the nuggets of wisdom from favorite wellness books; no need to read the book but highly encouraged)    April 5: Let's Talk    May 3: \"Essentialism; The Disciplined Pursuit of Less by Glen Lara (book bites discussion)    June 7: Let's Talk    July 5: NO MEETING, off for the 4th of July Holiday    August 2: The Blue Zones, Secrets for Living a Longer Life by Juan Jose Hopkins (book bites discussion)      If you would like bariatric surgery specific support group info please let your care team know.         Thank you,   [facility] Comprehensive Weight Management Team            SAXENDA (liralutide)    We are considering starting a GLP-1 " (Glucagon-like Peptide-1) medication called Saxenda. One of the ways it works is by slowing down the rate that food leaves your stomach. You feel lincoln and will eat less. It also helps regulate hormones that can help improve your blood sugars.    If you are a patient that checks blood sugars, continue to check as instructed by your doctor. Low blood sugars are rare but can happen if patients are on insulin or other oral agents. If you notice consistent low sugars or high sugars, your medication may need to be adjusted after your appointment. If this is the case, please call RN and provide her your blood sugar record from the last 3-4 days. The RN will get in touch with the doctor and call you back/3sun message with recommendations. We tolerate high sugars for a bit, so if sugars are running 180-200, this is ok. As weight starts dropping the blood sugars should too. If readings are consistently over 200 for 1-2 weeks, then you should call the doctor/nurse.    Dosing for this medication:   Week 1- Inject 0.6 mg daily  Week 2- Inject 1.2 mg daily  Week 3- Inject 1.8 mg daily  Week 4- Inject 2.4 mg daily  Week 5 and thereafter- Inject 3.0 mg daily    Side effects of GLP- Medications include: The most common side effects are all GI related and consist of: nausea, constipation, diarrhea, burping, or gassiness. Patients are advised to eat slowly and less, and nausea typically passes if people can stick it out.     The risk of pancreatitis (inflammation of the pancreas) has been associated with this type of medication, but is very rare.  If you have had pancreatitis in the past, this medication may not be for you. Please let us know about any past history of pancreas problems.    Symptoms of pancreatitis include: Pain in your upper stomach area which may travel to your back and be worse after eating. Your stomach area may be tender to the touch.  You may have vomiting or nausea and/or have a fever. If you should develop  any of these symptoms, stop the medication and contact your primary care doctor. They will do a blood test to check for pancreatitis.         There is a small chance you may have some low blood sugar after taking the medication.   The signs of low blood sugar are:  Weakness  Shaky   Hungry  Sweating  Confusion      See below for ways to treat low blood sugar without adding in lots of extra calories.      Treating Low Blood Sugar    If you have symptoms of low blood sugar (sweating, shaking, dizzy, confused) eat 15 grams of carbs and wait 15 minutes:    Glucose Tabs are best for sugars under 70 -  Dex4 or BD Glucose tablets are good, you will need to take 3-4 of these to equal 15 grams.     One small box of raisins  4 oz fruit juice box or   cup fruit juice  1 small apple  1 small banana    cup canned fruit in water    English muffin or a slice of bread with jelly   1 low fat frozen waffle with sugar-free syrup    cup cottage cheese with   cup frozen or fresh blueberries  1 cup skim or low-fat milk    cup whole grain cereal  4-6 crackers such as Triscuits      This medication is usually not covered by insurance and can be quite expensive. Sometimes a prior authorization is required, which may take up to 1-2 weeks for an insurance company to make a decision if they will cover the medication. Please be patient, you will be notified after a decision has been made.    For any questions or concerns please send a [employer] message to our team or call our weight management call center at [phone] during regular business hours. For questions during evenings or weekends your messages will be addressed during the next business day.  For emergencies please call 911 or seek immediate medical care.      (Do not stop taking it if you don't think it's working. For some people it works without them knowing it.)     In order to get refills of this or any medication we prescribe you must be seen in the medical weight mgmt clinic  every 2-4 months. Please have your pharmacy fax a refill request to [phone].

## 2023-11-19 NOTE — ASSESSMENT & PLAN NOTE
First seen 10/17/2023 for new MWM. Since then has restarted intermittent fasting, but as extreme as before. Since then has increase fatigue and cravings, and found this no longer sustainable for weight loss. Would like to discuss AOMs to day.     AOM discussed:   - Phentermine currently contraindicated with HTN, and BP not at gaol of <140/80. Can consider in the future when BP at goal.   - Topiramate - concern for hx of nephrectomy, currently only has 1 working kidney. GFR  >90. Would need to discuss with nephrology prior to starting   - Naltrexone - can consider in the future. No contraindications. No hx of liver disease. Not taking opioids.   - GLP-1 to be started today. No contraindications. Discussed side effects, risks, and benefits. No hx of pancreatitis. No personal or family hx of MTC or MENII. Will start Saxenda. Goal to transition to Wegovy or Zepbound in the future.

## 2023-11-20 ENCOUNTER — TELEPHONE (OUTPATIENT)
Dept: ENDOCRINOLOGY | Facility: CLINIC | Age: 18
End: 2023-11-20
Payer: COMMERCIAL

## 2023-11-20 ASSESSMENT — REFRACTION_CURRENTRX
OD_SPHERE: -6.00
OD_BASECURVE: 4.2/7.5
OS_ADDL_SPECS: BOSTON XO BLUE
OS_BASECURVE: 4.2/7.5
OS_DIAMETER: 14.8
OS_SPHERE: -4.75
OD_BRAND: ZENLENS RC
OD_DIAMETER: 14.8
OS_BRAND: ZENLENS RC

## 2023-11-20 NOTE — PROGRESS NOTES
No office visit. CL order only. Pt was dispensed lenses at today's [facility] visit. Right lens broke during I&R, reordered and mailed direct to him. He left with left lens    Contact Lens Billing  V-Code:  - GP scleral  Final Contact Lens Rx         Brand Base Curve Diameter Sphere Lens Addl. Specs    Right Zenlens RC 4.2/7.5 14.8 -6.00 2 flat H x std V [place] XO clear    Left Zenlens RC 4.2/7.5 14.8 -4.75 2 flat H x std V [place] XO blue           # of units: 2  Price per Unit: $215    This patient requires contact lenses that are medically necessary for either improvement in vision over spectacles, support of the ocular surface, or other therapeutic benefit. These are not cosmetic contact lenses.     Encounter Diagnoses   Name Primary?    High degree of astigmatism in both eyes Yes    Asthenopia of both eyes     Severe myopia of both eyes         Date of last eye exam: 9/28/23

## 2023-11-20 NOTE — TELEPHONE ENCOUNTER
Spoke with patient and scheduled follow-up appointment with Montserrat Nazario for 1/12/24 and follow-up with Nirmala Torre for 4/3/24 per the checkout order.

## 2023-11-21 ENCOUNTER — PATIENT OUTREACH (OUTPATIENT)
Dept: CARE COORDINATION | Facility: CLINIC | Age: 18
End: 2023-11-21
Payer: COMMERCIAL

## 2023-11-21 NOTE — COMMUNITY RESOURCES LIST (ENGLISH)
11/21/2023   [facility]  N/A  For questions about this resource list or additional care needs, please contact your primary care clinic or care manager.  Phone: [phone]   Email: N/A   Address: [address] [place] [id number]   Hours: N/A        Transportation       Free or low-cost transportation  1  [facility] - Free Bus and Gas Cards - Free or low-cost transportation Distance: 5.02 miles      [facility]   [address] [place] [address]  Language: English  Hours: Mon - Fri 9:00 AM - 4:00 PM , Sun 9:30 AM - 12:00 PM  Fees: Free   Phone: (644) 375-2638 Email: [email or web address] Website: [email or web address]     2  [employer] Distance: 5.15 miles      In-Person   [address] [place] [address]  Language: English, German  Hours: Mon 9:00 AM - 5:00 PM , Tue 9:30 AM - 7:00 PM , Wed 9:00 AM - 5:00 PM , Thu 9:30 AM - 7:00 PM , Fri 9:00 AM - 5:00 PM  Fees: Free   Phone: (369) 434-7453 Email: adelso@[employer] Website: http://www.[employer]     Transportation to medical appointments  3  [employer] Distance: 1.21 miles      In-Person   [address] [address]  Language: English  Hours: Mon - Thu 6:00 AM - 6:00 PM , Fri 6:00 AM - 5:00 PM  Fees: Insurance, Self Pay   Phone: (498) 192-3924 Email: office@[employer] Website: https://www.[employer]/     4  [facility] - Non-Emergency Medical Transportation Distance: 3.67 miles      In-Person   [address] [place] [address]  Language: English  Hours: Mon - Fri 8:00 AM - 4:00 PM Appt. Only  Fees: Self Pay   Phone: (972) 726-7229 Website: http://[email or web address]/Medical-Services/Emergency-medical-services/Non-emergency-transportation/          Important Numbers & Websites       Emergency Services   911  [facility] Services   311  Poison Control   (310) 766-9275  Suicide Prevention  Lifeline   (425) 822-2777 (TALK)  Child Abuse Hotline   (520) 927-8056 (4-A-Child)  Sexual Assault Hotline   (111) 504-4451 (HOPE)  National Runaway Safeline   (756) 692-3866 (RUNAWAY)  All-Options Talkline   (190) 785-4073  Substance Abuse Referral   (820) 633-4441 (HELP)

## 2023-11-21 NOTE — PROGRESS NOTES
Clinic Care Coordination Contact  Care Coordination Clinician Chart Review    Situation: Patient chart reviewed by Care Coordinator.       Background: Care Coordination Program started: 6/26/2023. Initial assessment completed and patient-centered care plan(s) were developed with participation from patient. Lead CC handed patient off to CHW for continued outreaches.       Assessment: Per chart review, patient outreach completed by CC CHW on 10/25/23.  Patient is actively working to accomplish goal(s). Patient's goal(s) appropriate and relevant at this time. Patient is not due for updated Plan of Care.  Assessments will be completed annually or as needed/with change of patient status.      Care Plan: General       Problem: HP GENERAL PROBLEM       Long-Range Goal: I would like to work with the Bariatric Clinic to assist me with weight loss.       Start Date: 6/28/2023 Expected End Date: 2/28/2024    This Visit's Progress: 30% Recent Progress: 20%    Priority: High    Note:     Barriers: elevated BMI  Strengths: strong advocate for himself.  Patient expressed understanding of goal: yes  Action steps to achieve this goal:  1. I understand Dr. Louis has sent  referral to the Bariatric Clinic on my behalf.   2. I understand a representative from the Bariatric Clinic will contact me directly to scheduled an intake appointment.   3. I will report progress towards this goal at scheduled outreach telephone calls from my Care Coordination team.      Discussed - completed virtual visit  Discussed on 8/2/23                                 Plan/Recommendations: The patient will continue working with Care Coordination to achieve goal(s) as above. CHW will continue outreaches at minimum every 30 days and will involve Lead CC as needed or if patient is ready to move to Maintenance. Lead CC will continue to monitor CHW outreaches and patient's progress to goal(s) every 6 weeks.     Plan of Care updated and sent to patient: No

## 2023-11-22 ENCOUNTER — PATIENT OUTREACH (OUTPATIENT)
Dept: CARE COORDINATION | Facility: CLINIC | Age: 18
End: 2023-11-22
Payer: COMMERCIAL

## 2023-11-22 ENCOUNTER — TELEPHONE (OUTPATIENT)
Dept: ENDOCRINOLOGY | Facility: CLINIC | Age: 18
End: 2023-11-22
Payer: COMMERCIAL

## 2023-11-22 NOTE — PROGRESS NOTES
Clinic Care Coordination Contact  Community Health Worker Follow Up    Care Gaps:     Health Maintenance Due   Topic Date Due    HEPATITIS B IMMUNIZATION (1 of 3 - 3-dose series) Never done    COVID-19 Vaccine (1) Never done    HIV SCREENING  Never done    HEPATITIS C SCREENING  Never done    DTAP/TDAP/TD IMMUNIZATION (2 - Td or Tdap) 10/24/2023    HPV IMMUNIZATION (2 - Risk male 3-dose series) 10/24/2023       Care Gaps Last addressed on 11/22/23    Care Plan:   Care Plan: General       Problem: HP GENERAL PROBLEM       Long-Range Goal: I would like to work with the Bariatric Clinic to assist me with weight loss.       Start Date: 6/28/2023 Expected End Date: 2/28/2024    This Visit's Progress: 40% Recent Progress: 30%    Priority: High    Note:     Barriers: elevated BMI  Strengths: strong advocate for himself.  Patient expressed understanding of goal: yes  Action steps to achieve this goal:  1. I understand Dr. Louis has sent  referral to the Bariatric Clinic on my behalf.   2. I understand a representative from the Bariatric Clinic will contact me directly to scheduled an intake appointment.   3. I will report progress towards this goal at scheduled outreach telephone calls from my Care Coordination team.      11/22 discussed, next PCP appt 11/28, next weight mgmt visit 1/12 and 1/16/24  Discussed - completed virtual visit  Discussed on 8/2/23                              Intervention and Education during outreach: Supportive Listening, CHW reminded patient of upcoming PCP appt on 11/28/23    CHW spoke to Quan today for monthly CC outreach call, patient states he is doing well no questions or concerns.  CHW discussed and update current CC goal progression.    Next PCP appt= 11/28/2023- arrive 12:40 pm      CHW Plan: Continue with monthly outreach call to discuss, support and update CC goal progression    Next CHW outreach call: 12/20/2023    Siri EVANS  Community Health Worker  [facility]  Coordination  Stacey Gore [email or web address]  Office: [phone]

## 2023-11-22 NOTE — TELEPHONE ENCOUNTER
PA Initiation    Medication: SAXENDA 18 MG/3ML SC SOPN  Insurance Company: [employer] Non-Specialty PA's - Phone [phone] Fax [phone]  Pharmacy Filling the Rx: [id number] - [place], [place] - [address] [facility]  Filling Pharmacy Phone: [phone]  Filling Pharmacy Fax: [phone]  Start Date: 11/22/2023     Prior Authorization Approval    Medication: SAXENDA 18 MG/3ML SC SOPN  Authorization Effective Date: 10/23/2023  Authorization Expiration Date: 5/20/2024  Approved Dose/Quantity:    Reference #: Key: [id number]   Insurance Company: [employer] Non-Specialty PA's - Phone [phone] Fax [phone]  Expected CoPay: $    CoPay Card Available:      Financial Assistance Needed:    Which Pharmacy is filling the prescription: [facility] #[id number] - [place] - [address] [facility]  Pharmacy Notified: Y  Patient Notified:

## 2023-11-27 DIAGNOSIS — E66.01 CLASS 3 SEVERE OBESITY WITH SERIOUS COMORBIDITY AND BODY MASS INDEX (BMI) OF 50.0 TO 59.9 IN ADULT, UNSPECIFIED OBESITY TYPE (H): Primary | ICD-10-CM

## 2023-11-27 DIAGNOSIS — E66.813 CLASS 3 SEVERE OBESITY WITH SERIOUS COMORBIDITY AND BODY MASS INDEX (BMI) OF 50.0 TO 59.9 IN ADULT, UNSPECIFIED OBESITY TYPE (H): Primary | ICD-10-CM

## 2023-11-27 RX ORDER — NALTREXONE HYDROCHLORIDE 50 MG/1
TABLET, FILM COATED ORAL
Qty: 30 TABLET | Refills: 3 | Status: SHIPPED | OUTPATIENT
Start: 2023-11-27 | End: 2024-02-05

## 2023-11-28 ENCOUNTER — HOSPITAL ENCOUNTER (OUTPATIENT)
Dept: GENERAL RADIOLOGY | Facility: HOSPITAL | Age: 18
Discharge: HOME | End: 2023-11-28
Attending: FAMILY MEDICINE
Payer: COMMERCIAL

## 2023-11-28 ENCOUNTER — OFFICE VISIT (OUTPATIENT)
Dept: FAMILY MEDICINE | Facility: CLINIC | Age: 18
End: 2023-11-28
Payer: COMMERCIAL

## 2023-11-28 VITALS
WEIGHT: 315 LBS | BODY MASS INDEX: 54.93 KG/M2 | DIASTOLIC BLOOD PRESSURE: 84 MMHG | RESPIRATION RATE: 18 BRPM | HEART RATE: 83 BPM | OXYGEN SATURATION: 98 % | SYSTOLIC BLOOD PRESSURE: 144 MMHG

## 2023-11-28 DIAGNOSIS — M54.50 ACUTE RIGHT-SIDED LOW BACK PAIN WITHOUT SCIATICA: ICD-10-CM

## 2023-11-28 DIAGNOSIS — M54.50 ACUTE RIGHT-SIDED LOW BACK PAIN WITHOUT SCIATICA: Primary | ICD-10-CM

## 2023-11-28 DIAGNOSIS — R06.02 SHORTNESS OF BREATH: ICD-10-CM

## 2023-11-28 DIAGNOSIS — R49.0 VOICE HOARSENESS: ICD-10-CM

## 2023-11-28 LAB — RADIOLOGIST FLAGS: ABNORMAL

## 2023-11-28 PROCEDURE — 71046 X-RAY EXAM CHEST 2 VIEWS: CPT

## 2023-11-28 PROCEDURE — 93010 ELECTROCARDIOGRAM REPORT: CPT | Performed by: INTERNAL MEDICINE

## 2023-11-28 PROCEDURE — 93005 ELECTROCARDIOGRAM TRACING: CPT | Performed by: FAMILY MEDICINE

## 2023-11-28 PROCEDURE — 72100 X-RAY EXAM L-S SPINE 2/3 VWS: CPT

## 2023-11-28 PROCEDURE — 99215 OFFICE O/P EST HI 40 MIN: CPT | Mod: 25 | Performed by: FAMILY MEDICINE

## 2023-11-28 PROCEDURE — 72070 X-RAY EXAM THORAC SPINE 2VWS: CPT

## 2023-11-28 RX ORDER — CYCLOBENZAPRINE HCL 10 MG
10 TABLET ORAL 3 TIMES DAILY PRN
Qty: 30 TABLET | Refills: 0 | Status: SHIPPED | OUTPATIENT
Start: 2023-11-28 | End: 2024-01-23

## 2023-11-28 RX ORDER — ALBUTEROL SULFATE 90 UG/1
2 AEROSOL, METERED RESPIRATORY (INHALATION) EVERY 6 HOURS PRN
Qty: 18 G | Refills: 0 | Status: SHIPPED | OUTPATIENT
Start: 2023-11-28 | End: 2023-12-19

## 2023-11-28 NOTE — PATIENT INSTRUCTIONS
Take the muscle spasm medications starting tonight.   Start the inhaler tonight before bed.   Please get the stress test done within the next month.       [facility]  [email or web address]  [address] , [place] [address]   ~21.7 mi  [phone]

## 2023-11-29 NOTE — RESULT ENCOUNTER NOTE
Hello -    Here are my comments about the recent results. There is some mild wear and tear of the joint. There is no fracture.     Please let us know if you have any questions or concerns.    Regards,  NACHO AGUILAR, DO

## 2023-11-30 LAB
ATRIAL RATE - MUSE: 79 BPM
DIASTOLIC BLOOD PRESSURE - MUSE: NORMAL MMHG
INTERPRETATION ECG - MUSE: NORMAL
P AXIS - MUSE: 13 DEGREES
PR INTERVAL - MUSE: 170 MS
QRS DURATION - MUSE: 80 MS
QT - MUSE: 374 MS
QTC - MUSE: 428 MS
R AXIS - MUSE: 8 DEGREES
SYSTOLIC BLOOD PRESSURE - MUSE: NORMAL MMHG
T AXIS - MUSE: 21 DEGREES
VENTRICULAR RATE- MUSE: 79 BPM

## 2023-12-01 ENCOUNTER — LAB (OUTPATIENT)
Dept: LAB | Facility: CLINIC | Age: 18
End: 2023-12-01
Payer: COMMERCIAL

## 2023-12-01 DIAGNOSIS — Z11.4 SCREENING FOR HIV (HUMAN IMMUNODEFICIENCY VIRUS): Primary | ICD-10-CM

## 2023-12-01 DIAGNOSIS — Z11.59 NEED FOR HEPATITIS C SCREENING TEST: ICD-10-CM

## 2023-12-01 DIAGNOSIS — R06.02 SHORTNESS OF BREATH: ICD-10-CM

## 2023-12-01 LAB — TROPONIN T SERPL HS-MCNC: 17 NG/L

## 2023-12-01 PROCEDURE — 36415 COLL VENOUS BLD VENIPUNCTURE: CPT

## 2023-12-01 PROCEDURE — 84443 ASSAY THYROID STIM HORMONE: CPT

## 2023-12-01 PROCEDURE — 87389 HIV-1 AG W/HIV-1&-2 AB AG IA: CPT

## 2023-12-01 PROCEDURE — 86803 HEPATITIS C AB TEST: CPT

## 2023-12-01 PROCEDURE — 84484 ASSAY OF TROPONIN QUANT: CPT

## 2023-12-02 LAB
HCV AB SERPL QL IA: NONREACTIVE
HIV 1+2 AB+HIV1 P24 AG SERPL QL IA: NONREACTIVE
TSH SERPL DL<=0.005 MIU/L-ACNC: 2.16 UIU/ML (ref 0.3–4.2)

## 2023-12-04 ENCOUNTER — HOSPITAL ENCOUNTER (OUTPATIENT)
Dept: GENERAL RADIOLOGY | Facility: HOSPITAL | Age: 18
Discharge: HOME | End: 2023-12-04
Attending: FAMILY MEDICINE | Admitting: FAMILY MEDICINE
Payer: COMMERCIAL

## 2023-12-04 DIAGNOSIS — R06.02 SHORTNESS OF BREATH: ICD-10-CM

## 2023-12-04 PROCEDURE — 71045 X-RAY EXAM CHEST 1 VIEW: CPT

## 2023-12-11 ENCOUNTER — TRANSFERRED RECORDS (OUTPATIENT)
Dept: HEALTH INFORMATION MANAGEMENT | Facility: CLINIC | Age: 18
End: 2023-12-11
Payer: COMMERCIAL

## 2023-12-19 DIAGNOSIS — R06.02 SHORTNESS OF BREATH: ICD-10-CM

## 2023-12-19 RX ORDER — ALBUTEROL SULFATE 90 UG/1
2 AEROSOL, METERED RESPIRATORY (INHALATION) EVERY 6 HOURS PRN
Qty: 18 G | Refills: 0 | Status: SHIPPED | OUTPATIENT
Start: 2023-12-19 | End: 2024-01-24

## 2023-12-20 ENCOUNTER — PATIENT OUTREACH (OUTPATIENT)
Dept: CARE COORDINATION | Facility: CLINIC | Age: 18
End: 2023-12-20
Payer: COMMERCIAL

## 2023-12-20 NOTE — PROGRESS NOTES
Clinic Care Coordination Contact  [facility]/Voicemail    Clinical Data: Care Coordinator Outreach    Outreach Documentation Number of Outreach Attempt   12/20/2023  12:51 PM 1       Left message on patient's voicemail with call back information and requested return call.    Plan: Care Coordinator CHW to update and discuss current CC goal progression   Care Coordinator will try to reach patient again in 2 weeks due to holidays= 1/2/2024.    CC Goal:  Weight Mgmt (Bariatric clinic)    Siri EVANS  Community Health Worker  [facility] Care Coordination  [email or web address]  Office: [phone]

## 2023-12-28 ENCOUNTER — TELEPHONE (OUTPATIENT)
Dept: ENDOCRINOLOGY | Facility: CLINIC | Age: 18
End: 2023-12-28
Payer: COMMERCIAL

## 2023-12-28 NOTE — TELEPHONE ENCOUNTER
Pt. took Naltrexone today and had a difficult time obtaining the Saxenda, and now has the Saxenda. Pt. would like communication on how to take the Saxenda and would like to know whether to take Saxenda today, or wait until tomorrow. Pt. would also like advice on how to take the Saxenda. Please advise Pt. [phone].

## 2023-12-28 NOTE — TELEPHONE ENCOUNTER
Sent patient [employer] message with administration instructions. Waiting to hear back from [facility] if patient should or shouldn't continue Naltrexone with Saxenda.

## 2024-01-02 ENCOUNTER — PATIENT OUTREACH (OUTPATIENT)
Dept: CARE COORDINATION | Facility: CLINIC | Age: 19
End: 2024-01-02
Payer: COMMERCIAL

## 2024-01-02 NOTE — PROGRESS NOTES
Clinic Care Coordination Contact  Care Coordination Clinician Chart Review    Situation: Patient chart reviewed by Care Coordinator.       Background: Care Coordination Program started: 6/26/2023. Initial assessment completed and patient-centered care plan(s) were developed with participation from patient. Lead CC handed patient off to CHW for continued outreaches.       Assessment: Per chart review, patient outreach completed by CC CHW on 1/2/23, second outreach attempt with unreachable letter sent.    Patient's goal(s) appropriate and relevant at this time. Patient is due for updated Plan of Care.  Assessments will be completed annually or as needed/with change of patient status.      Care Plan: General       Problem: HP GENERAL PROBLEM       Long-Range Goal: I would like to work with the Bariatric Clinic to assist me with weight loss.       Start Date: 6/28/2023 Expected End Date: 2/28/2024    This Visit's Progress: 40% Recent Progress: 30%    Priority: High    Note:     Barriers: elevated BMI  Strengths: strong advocate for himself.  Patient expressed understanding of goal: yes  Action steps to achieve this goal:  1. I understand Dr. Louis has sent  referral to the Bariatric Clinic on my behalf.   2. I understand a representative from the Bariatric Clinic will contact me directly to scheduled an intake appointment.   3. I will report progress towards this goal at scheduled outreach telephone calls from my Care Coordination team.      11/22 discussed, next PCP appt 11/28, next weight mgmt visit 1/12 and 1/16/24  Discussed - completed virtual visit  Discussed on 8/2/23                                 Plan/Recommendations: The patient will continue working with Care Coordination to achieve goal(s) as above. CHW will continue outreaches at minimum every 30 days and will involve Lead CC as needed or if patient is ready to move to Maintenance. Lead CC will continue to monitor CHW outreaches and patient's progress to  goal(s) every 6 weeks.     Plan of Care updated and sent to patient: Yes, via [employer]

## 2024-01-02 NOTE — PROGRESS NOTES
Clinic Care Coordination Contact  [facility]/Voicemail    Clinical Data: Care Coordinator Outreach    Outreach Documentation Number of Outreach Attempt   12/20/2023  12:51 PM 1   1/2/2024  10:48 AM 2       Left message on patient's voicemail with call back information and requested return call.    Plan: Care Coordinator CHW to update and discuss current CC goal progression  Care Coordinator will try to reach patient again in 3 weeks= 1/23/2024.  CHW sent unreachable letter via [employer] to patient today    CC Goal:  Weight Mgmt= next appt 1/12/2024    Siri EVANS  Community Health Worker  [facility] Care Coordination  [email or web address]  Office: [phone]

## 2024-01-02 NOTE — LETTER
[facility]  Patient Centered Plan of Care  About Me:        Patient Name:  Quan Glass,     I hope you are doing well! Here is a copy of your Care Plan with the goal we are supporting you with at this time. Please let me know if I can help in any other way.     Thanks,    Dave Myhre, RN   CCC RN  [phone]    YOB: 2005  Age:         18 year old   Thai MRN:    [id number] Telephone Information:  Home Phone [phone]   Mobile [phone]   Home Phone [phone]   Home Phone Not on file.       Address:  [address] [place] [address] [address]  [facility] [address] Email address:  [email or web address]      Emergency Contact(s)    Name Relationship Lgoziel Grabelino Work Phone Home Phone Mobile Phone   WILMAR SORENSEN Sister    [phone]           Primary language:  English     needed? No   [employer]:  [phone] op. 1  Other communication barriers:None    Preferred Method of Communication:     Current living arrangement: I live in a private home with family    Mobility Status/ Medical Equipment: Independent        Health Maintenance  Health Maintenance Reviewed: Due/Overdue   Health Maintenance Due   Topic Date Due    HEPATITIS B IMMUNIZATION (1 of 3 - 3-dose series) Never done    COVID-19 Vaccine (1) Never done    DTAP/TDAP/TD IMMUNIZATION (2 - Td or Tdap) 10/24/2023    PHQ-2 (once per calendar year)  01/01/2024    HPV IMMUNIZATION (2 - Male 3-dose series) 10/24/2023          My Access Plan  Medical Emergency 911   Primary Clinic Line [facility] - [phone]   24 Hour Appointment Line [phone] or  [phone]) (toll-free)   24 Hour Nurse Line [phone] (toll-free)   Preferred Urgent Care [facility] [phone]     [facility]  [phone]     [id number] - [place], [place] - [address]     Behavioral Health Crisis Line The National Suicide Prevention Lifeline at 1-415.185.9412 or Text/Call 988           My Care Team Members  Patient Care Team         Relationship Specialty Notifications Start End    Nidhi Little DO PCP - General Family Medicine  7/9/23     Phone: [phone] Fax: [phone]         [address] [place] [address]    Edyta Hawkins NP Nurse Practitioner Nurse Practitioner  3/3/15     ref to nephro    Phone: [phone] Fax: [phone]         [facility] [address] [facility] [address]    Pili Hernandez MD MD Pediatric Nephrology  3/3/15     Phone: [phone] Fax: [phone]         [address] [address]    Edyta Hawkins NP  Nurse Practitioner  6/10/23     Merged    Phone: [phone] Fax: [phone]         [facility] [address] [facility] [address]    Vivian Gonzalez MD MD Family Medicine  6/12/23     Referring to DERM    Phone: [phone] Fax: [phone]         [address] [facility] [address] [facility] [address]    Jake Lundberg MD MD Dermapathology  6/12/23     Phone: [phone] Fax: [phone]         [address] [address]    Bebeto Vinson MD MD Allergy & Immunology  6/15/23     Phone: [phone] Fax: [phone]         [address] [place] [address]    Lashawn Jhaveri MD MD Dermatology  6/15/23     referring to Allergy    Phone: [phone] Fax: [phone]         [address] [place] [address] [address] [place] [address]    Nidhi Little DO Assigned PCP   6/15/23     Phone: [phone] Fax: [phone]         [address] [place] [address]Enrrique Mobley MD MD Nephrology  6/27/23     Phone: [phone] Fax: [phone] [address] [facility] [address]    Myhre, David J, RN Lead Care Coordinator Primary Care - CC Admissions 6/27/23     Nidhi Little,  Referring Physician Family  Medicine  7/10/23     Referring Physician to eye    Phone: [phone] Fax: [phone]         [address] [facility] [address]    Kailyn Matias MD MD Ophthalmology  7/10/23     Phone: [phone] Fax: [phone]         [address] [facility] [address]    Veronica Abbasi Chi, OD MD Optometry  7/18/23     Phone: [phone] Fax: [phone]         [address] [facility] [address]    Shaina Saunders CHMICK Community Health Worker  Admissions 8/2/23     Nomran Couch MD MD Dermatology  8/30/23     Phone: [phone] Fax: [phone]         [address] [place] [address]    Uche Islas MD MD Otolaryngology  9/11/23     Phone: [phone] Fax: [phone]         [address] [place] [address]    Enrrique Ibarra MD Assigned Nephrology Provider   9/16/23     Phone: [phone] Fax: [phone]         [address] [place] [address]    Michelle Rosario OD Assigned Surgical Provider   9/30/23     Phone: [phone] Fax: [phone]         [address] [place] [address]                My Care Plans  Self Management and Treatment Plan    Care Plan  Care Plan: General       Problem: HP GENERAL PROBLEM       Long-Range Goal: I would like to work with the Bariatric Clinic to assist me with weight loss.       Start Date: 6/28/2023 Expected End Date: 2/28/2024    This Visit's Progress: 40% Recent Progress: 30%    Priority: High    Note:     Barriers: elevated BMI  Strengths: strong advocate for himself.  Patient expressed understanding of goal: yes  Action steps to achieve this goal:  1. I understand Dr. Louis has sent  referral to the Bariatric Clinic on my behalf.   2. I understand a representative from the Bariatric Clinic will contact me directly to scheduled an intake appointment.   3. I will report progress towards this goal at scheduled outreach telephone calls from my Care Coordination team.      11/22 discussed, next PCP appt 11/28, next  weight mgmt visit 1/12 and 1/16/24  Discussed - completed virtual visit  Discussed on 8/2/23                              Action Plans on File:                       Advance Care Plans/Directives:   Advanced Care Plan/Directives on file:   No    Discussed with patient/caregiver(s): No data recorded           My Medical and Care Information  Problem List   Patient Active Problem List   Diagnosis    Elevated blood pressure    Pre-diabetes    Class 3 severe obesity with serious comorbidity and body mass index (BMI) of 50.0 to 59.9 in adult (H)      Current Medications and Allergies:  See printed Medication Report.    Care Coordination Start Date: 6/26/2023   Frequency of Care Coordination: monthly, more frequently as needed     Form Last Updated: 01/02/2024

## 2024-01-02 NOTE — LETTER
[facility] CARE COORDINATION  [address] [address]    January 2, 2024    Quan FLORA Davidson  [address] [facility] [address]  [facility] [address]      Dear Jose Daniel,    I have been attempting to reach you since our last contact. I would like to continue to work with you and provide any additional support you may need on achieving your health care related goals. I would appreciate if you would give me a call at [phone] to let me know if you would like to continue working together. I know that there are many things that can affect our ability to communicate and I hope we can continue to work together.    All of us at the [facility] are invested in your health and are here to assist you in meeting your goals.     Sincerely,    Siri EVANS  Community Health Worker  [facility] Care Coordination  [facility] Correction [email or web address]  Office: [phone]

## 2024-01-06 ENCOUNTER — HOSPITAL ENCOUNTER (EMERGENCY)
Facility: CLINIC | Age: 19
Discharge: LEFT BEFORE DISCHARGE | End: 2024-01-06
Payer: COMMERCIAL

## 2024-01-06 VITALS
SYSTOLIC BLOOD PRESSURE: 155 MMHG | RESPIRATION RATE: 16 BRPM | WEIGHT: 315 LBS | TEMPERATURE: 98.6 F | HEART RATE: 87 BPM | DIASTOLIC BLOOD PRESSURE: 89 MMHG | HEIGHT: 64 IN | BODY MASS INDEX: 53.78 KG/M2 | OXYGEN SATURATION: 99 %

## 2024-01-07 NOTE — ED TRIAGE NOTES
Yesterday, pt was using R arm to reach up to grasp something when he had sudden shooting pain down from R shoulder. Pt has severe pain when trying to move the RUE and has been hanging dependent. Now having numb sensation to distal arm.     Triage Assessment (Adult)       Row Name 01/06/24 1800          Triage Assessment    Airway WDL WDL        Respiratory WDL    Respiratory WDL WDL        Skin Circulation/Temperature WDL    Skin Circulation/Temperature WDL WDL        Cardiac WDL    Cardiac WDL WDL        Peripheral/Neurovascular WDL    Peripheral Neurovascular WDL WDL        Cognitive/Neuro/Behavioral WDL    Cognitive/Neuro/Behavioral WDL WDL

## 2024-01-08 ENCOUNTER — TELEPHONE (OUTPATIENT)
Dept: DERMATOLOGY | Facility: CLINIC | Age: 19
End: 2024-01-08
Payer: COMMERCIAL

## 2024-01-08 NOTE — TELEPHONE ENCOUNTER
Called and spoke with pt's sister. She confirmed appt details and that pt is planning to be here.     Fina Foster, Procedure  1/8/2024 1:51 PM

## 2024-01-10 ENCOUNTER — PRE VISIT (OUTPATIENT)
Dept: ALLERGY | Facility: CLINIC | Age: 19
End: 2024-01-10
Payer: COMMERCIAL

## 2024-01-10 ENCOUNTER — OFFICE VISIT (OUTPATIENT)
Dept: ALLERGY | Facility: CLINIC | Age: 19
End: 2024-01-10
Payer: COMMERCIAL

## 2024-01-10 DIAGNOSIS — J30.9 ALLERGIC RHINITIS WITH POSTNASAL DRIP: ICD-10-CM

## 2024-01-10 DIAGNOSIS — H01.119 CONTACT DERMATITIS OF EYELID, UNSPECIFIED LATERALITY: ICD-10-CM

## 2024-01-10 DIAGNOSIS — L23.5 ALLERGIC DERMATITIS DUE TO OTHER CHEMICAL PRODUCT: ICD-10-CM

## 2024-01-10 DIAGNOSIS — J32.9 CHRONIC RHINOSINUSITIS: ICD-10-CM

## 2024-01-10 DIAGNOSIS — L20.89 OTHER ATOPIC DERMATITIS: ICD-10-CM

## 2024-01-10 DIAGNOSIS — Z88.9 ATOPY: ICD-10-CM

## 2024-01-10 DIAGNOSIS — J45.21 MILD INTERMITTENT ASTHMA WITH ACUTE EXACERBATION: Primary | ICD-10-CM

## 2024-01-10 DIAGNOSIS — R09.82 ALLERGIC RHINITIS WITH POSTNASAL DRIP: ICD-10-CM

## 2024-01-10 DIAGNOSIS — Z91.048 ALLERGY TO MOLD: ICD-10-CM

## 2024-01-10 PROCEDURE — 95004 PERQ TESTS W/ALRGNC XTRCS: CPT | Performed by: DERMATOLOGY

## 2024-01-10 PROCEDURE — 99214 OFFICE O/P EST MOD 30 MIN: CPT | Mod: 25 | Performed by: DERMATOLOGY

## 2024-01-10 PROCEDURE — 95024 IQ TESTS W/ALLERGENIC XTRCS: CPT | Performed by: DERMATOLOGY

## 2024-01-10 RX ORDER — BUDESONIDE AND FORMOTEROL FUMARATE DIHYDRATE 160; 4.5 UG/1; UG/1
2 AEROSOL RESPIRATORY (INHALATION) 2 TIMES DAILY
Qty: 10.2 G | Refills: 3 | Status: SHIPPED | OUTPATIENT
Start: 2024-01-10 | End: 2024-02-05

## 2024-01-10 NOTE — PROGRESS NOTES
[facility] Dermato-allergology Note  Office visit  Encounter Date: Taco 10, 2024  ____________________________________________    CC: No chief complaint on file.      HPI:  (Taco 10, 2024)  Mr. Quan Davidson is a(n) 18 year old male who presents today as new patient for allergy consultation  - sent by PCP for recurrent dermatitis in face and periorbital and flexural areas of left arm and now still hyperpigmemtations periorbital and on arms.   - Started about 8 months ago and since 4 months much less problems.  - uses several times daily Hydrocortisone cream and if he doesn't use it the rash comes back  - only used Protopic one time, because it was burning a lot  - otherwise feeling well in usual state of health    Physical exam:  General: In no acute distress, well-developed, well-nourished  Eyes: no conjunctivitis  ENT: no signs of rhinitis   Pulmonary: no wheezing or coughing  Skin: in a photo from 2023 desquamation and lichenification around the left eye with some papules --> subacute eczema, not really Rosacea    Past Medical History:   Patient Active Problem List   Diagnosis    Elevated blood pressure    Pre-diabetes    Class 3 severe obesity with serious comorbidity and body mass index (BMI) of 50.0 to 59.9 in adult (H)     Past Medical History:   Diagnosis Date    Glaucoma (increased eye pressure)     Term birth of male      Traumatic brain injury (H) 2014    UPJ (ureteropelvic junction) obstruction 03/10/2015    UPJ obstruction, congenital 2014    identified on imaging for unrelated trauma       Allergies:  Allergies   Allergen Reactions    Ibuprofen        Medications:  Current Outpatient Medications   Medication    albuterol (PROAIR HFA/PROVENTIL HFA/VENTOLIN HFA) 108 (90 Base) MCG/ACT inhaler    cyclobenzaprine (FLEXERIL) 10 MG tablet    insulin pen needle (31G X 5 MM) 31G X 5 MM miscellaneous    liraglutide - Weight Management (SAXENDA) 18 MG/3ML  pen    naltrexone (DEPADE/REVIA) 50 MG tablet     No current facility-administered medications for this visit.       Social History:  The patient works in the moment not (just got high school diploma). Patient has the following hobbies or non-occupational exposure: music (singer)    Family History:  Family History   Problem Relation Age of Onset    Diabetes Father     Hypertension Father    Younger brother has generalized eczema    Previous Labs, Allergy Tests, Dermatopathology, Imaging:  Consult by Dr. Jhaveri June 2023    Assessment & Plan:   # Eyelid dermatitis, suspect ACD  # Mild flexural eczema, keratosis pilaris, and rhinitis likely atopic predisposition   Physical exam as below likely consistent with a allergic contact dermatitis given eyelid involvement.  Likely has a background  of atopic dermatitis given other concurrent findings and symptoms.  Etiology natural history of this condition was discussed with the patient as well as treatment options and the rationale for use.  We will start topical therapies including desonide ointment twice daily for the next 2 weeks.  Side effects of steroids discussed including risk of glaucoma and cataracts with prolonged use.  We will then transition to topical tacrolimus thereafter with repeat use of desonide as needed.  Additionally allergy referral was placed for further evaluation and work-up.   - desonide (DESOWEN) 0.05 % external ointment; Apply topically 2 times daily  Dispense: 60 g; Refill: 1  - tacrolimus (PROTOPIC) 0.1 % external ointment; Apply topically 2 times daily  Dispense: 60 g; Refill: 1  - stop doxycycline, stop metronidazole  - Adult Dermatology Referral; Future    Referred By: Lashawn Jhaveri MD  [address] [place] [address]     Allergy Tests:    Past Allergy Test    Order for Future Allergy Testing:    [] Outpatient  [] Inpatient: Law..../ Bed ....       Skin Atopy (atopic dermatitis) [x] Yes   [] No since childhood flexural  dermatitis  Contact allergies:   [] Yes   [x] No ..........  Hand eczema:   [] Yes   [x] No           Leading hand:   [] R   [] L       [] Ambidextrous         Drug allergies:        [] Yes   [x] No  which?..for kidney reasons no Ibuprofen.    Urticaria/Angioedema  [x] Yes   [] No 5 years ago hives  Food Allergy:  [] Yes   [x] No  which?......  Pets :  [x] Yes   [] No  which?.cats and dogs at home        [x]  Rhinitis   [] Conjunctivitis   [x] Sinusitis   [] Polyposis   [] Otitis   [] Pharyngitis         [x]  Postnasal drip    []  None for many years wakes up with stuffy and Rhinosinusitis/PND in early spring and late fall  Operations:   [] Tonsils   [] Septum   [] Sinus   [] Polyposis        [x] Asthma bronchiale   [x] Coughing      []  None uses inhaler for wheezing at night (worse since 3 months ==> uses 2xdaily Albuterol 10pm and morning)  Symptoms (mostly Rhinoconjunctivitis and Asthma) aggravated by:  Season   [] I   [] II   [x] III   [x] IV   []V   []VI   []VII   []VIII   []IX   [x]X   [x]XI   []XII     [x] perennial   Day time      [x] morning   [] noon      [] evening        [] night    [] whole day........  []  none  Location/changes    [] inside        [] outside   [] mountains    [] sea     [] others.............   []  none  Triggers, specific     [] animals     [] plants     [] dust              [] others ...........................    []  none  Triggers, others       [] work          [] psyche    [] sport            [] others .............................  []  none  Irritant                [] phys efforts [] smoke    [] heat/cold     [] odors  []others............... []  none    Order for PATCH TESTS  Reason for tests (suspected allergy): periorbital and facial eczema  Known previous allergies: none  Standardized panels  [x] Standard panel (40 tests)  [x] Preservatives & Antimicrobials (31 tests)  [x] Emulsifiers & Additives (25 tests)   [x] Perfumes/Flavours & Plants (25 tests)  [] Hairdresser panel  (12 tests)  [] Rubber Chemicals (22 tests)  [] Plastics (26 tests)  [] Colorants/Dyes/Food additives (20 tests)  [] Metals (implants/dental) (24 tests)  [] Local anaesthetics/NSAIDs (13 tests)  [x] Antibiotics & Antimycotics (14 tests)   [] Corticosteroids (15 tests)   [] Photopatch test (62 tests)   [] others: ...      [x] Patient's own products: .Protopic and Desonide  DO NOT test if chemical or biological identity is unknown!   always ask from patient the product information and safety sheets (MSDS)       Order for PRICK TESTS    Reason for tests (suspected allergy): atopy?  Known previous allergies: n/a    Standardized prick panels  [x] Atopic panel (20 tests)  [] Pediatric Panel (12 tests)  [] Milk, Meat, Eggs, Grains (20 tests)   [] Dust, Epithelia, Feathers (10 tests)  [] Fish, Seafood, Shellfish (17 tests)  [] Nuts, Beans (14 tests)  [] Spice, Vegetable, Fruit (17 tests)  [] Pollen Panel = Tree, Grass, Weed (24 tests)  [] Others: ...      [] Patient's own products: ...  DO NOT test if chemical or biological identity is unknown!   always ask from patient the product information and safety sheets (MSDS)     Standardized intradermal tests  [] Penicillium notatum [] Aspergillus fumigatus [] House dust mites D.far & D. pteron  [] Cat    [] dog  [] Others: ...  [] Bee venom   [] Wasp venom  !!Specific protocol with dilutions!!       Order for Drug allergy tests (prick & Intradermal &  patch tests)    [] Penicillin G  [] Ampicillin [] Cefazolin   [] Ceftriaxone   [] Ceftazidime  [] Bactrim    [] Others: ...  Order for ... as test date    Atopy Screen (Placed Taco 10, 2024)  No Substance Readings (15 min) Evaluation   POS Histamine 1mg/ml ++    NEG NaCl 0.9% -      No Substance Readings (15 min) Evaluation   1 Alternaria alternata (tenuis)  -    2 Cladosporium herbarum -    3 Aspergillus fumigatus -    4 Penicillium notatum -    5 Dermatophagoides pteronyssinus -    6 Dermatophagoides farinae -    7 Dog epithelium  (canis spp) -    8 Cat hair (debbi catus) -    9 Cockroach   (Blatella americana & germanica) -    10 Grass mix midwest   (Nayely, Orchard, [place], Denis) ++    11 Kurt grass (sorghum halepense) -    12 Weed mix   (common Cocklebur, Lamb s quarters, rough redroot Pigweed, Dock/Sorrel) -    13 Mug wort (artemisia vulgare) -    14 Ragweed giant/short (ambrosia spp) -    15 White birch (Betula papyrifera) -    16 Tree mix 1 (Pecan, Maple BHR, Oak RVW, american [place], black [place]) -    17 Red cedar (juniperus virginia) -    18 Tree mix 2   (white [place], river/red Birch, black [place], common [place], american Elm) -    19 Box elder/Maple mix (acer spp) -    20 [place] shagbark (carya ovata) -           Conclusion       Intradermal Testing (Placed Taco 10, 2024)  No Substance Conc.  Reading (15min)  immediate Papule [mm] / Erythema [mm] Reading   (... days)  delayed Papule [mm] / Erythema [mm] Remarks   DF Standard Dust Mite - D. Farinae 1:10 -   -    DP Standard Dust Mite - D. Pteronyssinus 1:10 -   -    A Aspergillus fumigatus  1:10 -   -    P Penicillium notatum 1:10 -   -     Alternaria alt 1:10 +/++ 5/8  -     Dog epithelium 1:10 (+) 4/4  -     Cat epithelium 1:10 + 5/6      Conclusions:     ________________________________    Assessment & Plan:    ==> Final Diagnosis:     # recurrent subacute dermatitis face and periorbital  DDx allergic contact dermatitis and/or   Atopic dermatitis  * chronic illness with exacerbation, progression, side effects from treatment    # atopic predisposition with  Wheezing at night and stuffy nose morning =DDx GERD or cat?  Aggravation with RS and PND early spring and late fall = immediate sensitization to Alternaria alt  Since childhood flexural dermatitis  Clinically not relevant sensitization to grass pollens  * chronic illness with exacerbation, progression, side effects from treatment      These conclusions are made at the best of one's knowledge and belief based on the  provided evidence such as patient's history and allergy test results and they can change over time or can be incomplete because of missing information's.    ==> Treatment Plan:  >> symbicort inhaler 160/4.5 2xdaily and Albuterol in reserve  >> pft  >> plan patch tests for periorbital dermatitis  >> maybe sent patient later to GI evaluation for GERD (Famotidine not yet effective, but needs more time, PPIs not possible for kidney problem)    Procedures Performed: Allergy tests, including prick and intradermal tests     Staff:  Provider    Follow-up in Derm-Allergy clinic for patch tests as planned and evaluation if any delayed reaction to IDT    I spent a total of 40 minutes with Quan Davidson. This time was spent counseling the patient and/or coordinating care, explaining the allergy tests, performing allergy tests and assessing the clinical relevance.

## 2024-01-10 NOTE — PATIENT INSTRUCTIONS
If you have questions or concerns regarding your Allergy Clinic bill or cost of care estimates, please reach out to our financial services at [email or web address]    CPT code for patch testing: CPT-31710 (Contact your insurance provider to ensure coverage)    Customer Service    Ph: [phone] or  [phone]    Hours of operation:   - 8:00am - 5:30pm  F 9:00am - 4:30pm    Cost of Care/Estimates Team    Ph: [phone]    Hours of operation:  [facility] 8:00am - 3:00pm  F 9:00am - 3:00pm          _____________________________    PATCH TESTING    WHAT IS A PATCH TEST ?    A patch test is a way of identifying whether a substance has caused a delayed reaction with skin inflammation, such as contact eczema or delayed (after days) reactions to drugs. We will use various different types of test compounds, which may include common allergens in occupation and daily life such as  preservatives, fragrances or even drugs. Most of the time we will use standardized, prefabricated test solutions and the choice of the substances and series tested will depend on the history of the allergic reactions. Sometimes we will test your own products you are exposed at home or at work. In order to avoid severe toxic reactions we need detailed information s or safety sheets about each of these test compounds.    HOW IS A PATCH TEST PERFORMED ?    You will be given three appointments to complete this test. On the first appointment the nurse will apply 100-180 small test chambers each one of them containing a different allergen on your back and/or on your arms. We will use hypoallergenic and somehow waterproof adhesive tape. Afterwards the different sites will be marked with a waterproof marker. These patches must stay in place for 2 days. On the second appointment the patches will be removed and the allergy doctor/nurse will evaluate the skin reactions and maybe re-apply the marks. On the third appointment the allergy doctor  will re-evaluate possible allergic reactions and discuss with you the nature of the allergens you react to and how to avoid them.    AVOID THE FOLLOWING:    DO NOT wash your back and other test areas during the entire test period (3-5 days), NO bathing or swimming  AVOID strenuous exercise with sweating  DO NOT scratch the test area  AVOID exposure to UV irradiation (sun, therapy)    Patch tests should be performed when the allergy is resolved  Remove patch tests only if severe reaction (itch, pain, blistering) and report to your doctor immediately. Karthik then the locations of each test field  If patch tests peel off, then try to fix them and record time and karthik test field  No oral steroids (e.g. Prednisone) 1 month prior to tests    WHAT ARE THE POSSIBLE RISKS OF PATCH TESTS ?    If you are allergic to a compound tested you will develop after a few days localized skin reactions similar to your previous allergic reaction. This includes formation of red, itchy skin lesions of few mm to cm with small vesicles and even blisters. The lesions will fade off over days and weeks and might sometimes leave for a few months some skin pigmentations. In rare cases a localized reaction to patch tests can become generalized. The tests with your own products might have some risks because they are not standardized and unanticipated reactions can occur. We need as much information as possible to evaluate if your own products are safe to test and under what conditions. This has to be evaluated for each individual product.        ? WHAT ARE THE PREPARATIONS NEEDED FOR THESE VARIOUS ALLERGY TESTS ?    Some medications can affect the reactions to allergens during the tests. Therefore reveal all the medications you take to the allergy team and the doctor will discuss with you the medications before/during the tests. Normally, you have to respect following rules (unless otherwise instructed by doctor):  For prick, intradermal and  provocation tests stop antihistamines (e.g. loratadine (Claritin), cetirizine (Zyrtec), fexofenadine (Allegra) etc 1 week prior to the appointment   For patch tests and provocation tests, stop systemic corticosteroids 1 month and topical steroids 1 week prior to tests  Eat normally and take a shower before tests    _____________________________    SKIN PRICK TESTING    WHAT IS A SKIN PRICK TEST (SPT) ?    A skin prick test (SPT) is a simple and reliable diagnostic test used to identify substances ( allergens ) responsible for triggering the symptoms of allergy. The basic SPT panel includes common allergens, such as house dust mites, molds, dog and cat allergens and grass/tree pollen allergens. We have other more specialized series for various food allergens and sometimes we test your own food directly on your skin. Tests will be usually performed on the skin of the forearm (rarely on back). The skin may develop a red and itchy reaction which can be an indication of an allergy to to tested substance, but will be confirmed by discussion with the allergy specialist    HOW IS A SPT PERFORMED ?    The skin will be disinfected with 70% Isopropanol alcohol, then marked and numbered with a pen to identify the areas where the specific allergens will be tested. Afterwards a drop of the allergen solution will be placed on the skin and then the skin gently pricked using the tip of a specially designed prick needle. With this procedure the most superficial part of the skin will be pierced to allow the test solution to diffuse into the skin and make contact with the reactive immune cells. After 15-30min the area will be examined and evaluated for possible allergic reactions.        WHAT ARE THE POSSIBLE RISKS OF SPT ?    If the skin reacts it will develop red, itchy wheals of 5-30mm diameter. The wheal and itch will usually disappear spontaneously after 1-2 hours. Sometimes there might be a delayed reactions after days and this  has to be reported to the treating allergy specialist (could be another kind of reaction pattern and important piece of information). Rarely there are more serious generalized allergic reactions observed and therefore you will be under observation of the allergy team during the entire procedure. There is a small risk for some bleeding and skin infection by the SPT.    _____________________________    INTRADERMAL TESTS      WHAT IS AN INTRADERMAL TEST (IDT) ?    An intradermal test (IDT) is basically a continuation of the SPT and is sometimes proposed if the skin prick test is negative and the person is strongly suspected to have an allergy to a particular substance. IDT is particularly used for diagnosis of drug allergies and only sterile solutions will be tested by IDT. Because more allergen is delivered to the skin than in SPT the IDT can add more sensitivity to the allergy tests, but with a higher potential risk.     HOW IS AN INTRADERMAL TEST (IDT) PERFORMED ?    A small amount (~ 0.05ml) of the suspected allergen is injected with a very fine insulin needle just beneath the skin. The injections are made at spaced intervals after disinfection and marking of the skin areas. The tests are usually performed on the forearm (rarely back). The initial test will be started with a very diluted solution and if the results are negatives the procedure might be repeated with serial dilutions of higher concentrations. Therefore, the estimated duration of this test is about 45-60 min. Sometimes we observe delayed type reactions to the intradermal tests after 1-4 days and if this is the case take a photo and inform our staff and load the photo into [employer]. Best would be to take the photos with a ruler that we know at which position the positive test was.          WHAT ARE THE POSSIBLE RISKS OF IDT ?    If the skin reacts it will develop red, itchy wheals of 5-30mm diameter. The wheal and itch will usually disappear  spontaneously after 1-2 hours. Sometimes there might be a delayed reactions after days and this has to be reported to the treating allergy specialist (could be another kind of reaction pattern and important piece of information). Rarely there are more serious generalized allergic reactions observed and therefore you will be under observation of the allergy team during the entire procedure. Only sterile solutions will be used for injections, but there is still a small risk for infections or unpredictable local toxic reactions by the allergens. Some transient bleeding might occur.     _____________________________      ORAL PROVOCATION TEST      WHAT IS AN  ORAL PROVOCATION TEST (OPT) ?    An oral provocation test (OPT) is a procedure primarily used to exclude a specific allergy to a particular drug or food. These substances are then administered orally, rarely in case of drugs by subcutaneous or intravenous injections. This test is only conducted if the skin prick and intradermal and/or patch tests were negatives. A formal written consent will be taken prior to the provocation test.         HOW IS AN ORAL PROVOCATION TEST PERFORMED?    For oral (food/drugs) provocation tests you will have to ingest the food or drug hidden in a capsule or in its natural form. The test will usually be placebo-controlled and will include a capsule or other application form not containing the suspected allergen, but non-reactive Mannitol sugar. The various drugs/food will be given at specific time intervals under strict medical supervision.     Two to six serial doses containing the test food or drug will given at normally 30min time intervals, which might vary for each individual. You will be required to stay at the clinic at all times so that the allergy doctors and nurses can early recognize and treat immediately possible allergic reactions. After the last dose you have to stay during at least 1h for observation. The entire procedure  will take about 2-6hours, depending on the number of incremental doses and the observation time.     WHAT ARE THE POSSIBLE RISKS OF ORAL PROVOCATION TEST ?    The provocation tests have the highest risk potential of all the tests and therefore close observation is mandatory. The entire procedure will be discussed prior to the test (except when the placebo will be given). The reactions can go from local allergic reactions to more severe generalized reactions. Therefore, we will not perform provocation tests without prior evaluation by prick or intradermal tests and we plan to exclude an allergy by this test. If there is a higher risk, the test will be performed in a bed and with a secure intravenous access with infusion. The medication of a severe allergic reactions include high dose corticosteroids, antihistamines and if necessary epinephrine (Epi-Pen).    Useful Contact Information    Change of appointments or allergy-related [phone]  Email: [facility] [email or web address]  Location/address: [address] [place] [id number]    If you develop any serious or adverse allergic reaction after office hours please seek immediate medical assistance at the nearest clinic or emergency room.

## 2024-01-10 NOTE — LETTER
1/10/2024         RE: Quan Davidson  [address]  [address]  [facility] [address]        Dear Colleague,    Thank you for referring your patient, Quan Davidson, to the [facility] [place]. Please see a copy of my visit note below.    [facility] Dermato-allergology Note  Office visit  Encounter Date: Taco 10, 2024  ____________________________________________    CC: No chief complaint on file.      HPI:  (Taco 10, 2024)  Mr. Quan Davidson is a(n) 18 year old male who presents today as new patient for allergy consultation  - sent by PCP for recurrent dermatitis in face and periorbital and flexural areas of left arm and now still hyperpigmemtations periorbital and on arms.   - Started about 8 months ago and since 4 months much less problems.  - uses several times daily Hydrocortisone cream and if he doesn't use it the rash comes back  - only used Protopic one time, because it was burning a lot  - otherwise feeling well in usual state of health    Physical exam:  General: In no acute distress, well-developed, well-nourished  Eyes: no conjunctivitis  ENT: no signs of rhinitis   Pulmonary: no wheezing or coughing  Skin: in a photo from 2023 desquamation and lichenification around the left eye with some papules --> subacute eczema, not really Rosacea    Past Medical History:   Patient Active Problem List   Diagnosis     Elevated blood pressure     Pre-diabetes     Class 3 severe obesity with serious comorbidity and body mass index (BMI) of 50.0 to 59.9 in adult (H)     Past Medical History:   Diagnosis Date     Glaucoma (increased eye pressure)      Term birth of male       Traumatic brain injury (H) 2014     UPJ (ureteropelvic junction) obstruction 03/10/2015     UPJ obstruction, congenital 2014    identified on imaging for unrelated trauma       Allergies:  Allergies   Allergen Reactions     Ibuprofen         Medications:  Current Outpatient Medications   Medication     albuterol (PROAIR HFA/PROVENTIL HFA/VENTOLIN HFA) 108 (90 Base) MCG/ACT inhaler     cyclobenzaprine (FLEXERIL) 10 MG tablet     insulin pen needle (31G X 5 MM) 31G X 5 MM miscellaneous     liraglutide - Weight Management (SAXENDA) 18 MG/3ML pen     naltrexone (DEPADE/REVIA) 50 MG tablet     No current facility-administered medications for this visit.       Social History:  The patient works in the moment not (just got high school diploma). Patient has the following hobbies or non-occupational exposure: music (singer)    Family History:  Family History   Problem Relation Age of Onset     Diabetes Father      Hypertension Father    Younger brother has generalized eczema    Previous Labs, Allergy Tests, Dermatopathology, Imaging:  Consult by Dr. Jhaveri June 2023    Assessment & Plan:   # Eyelid dermatitis, suspect ACD  # Mild flexural eczema, keratosis pilaris, and rhinitis likely atopic predisposition   Physical exam as below likely consistent with a allergic contact dermatitis given eyelid involvement.  Likely has a background  of atopic dermatitis given other concurrent findings and symptoms.  Etiology natural history of this condition was discussed with the patient as well as treatment options and the rationale for use.  We will start topical therapies including desonide ointment twice daily for the next 2 weeks.  Side effects of steroids discussed including risk of glaucoma and cataracts with prolonged use.  We will then transition to topical tacrolimus thereafter with repeat use of desonide as needed.  Additionally allergy referral was placed for further evaluation and work-up.   - desonide (DESOWEN) 0.05 % external ointment; Apply topically 2 times daily  Dispense: 60 g; Refill: 1  - tacrolimus (PROTOPIC) 0.1 % external ointment; Apply topically 2 times daily  Dispense: 60 g; Refill: 1  - stop doxycycline, stop metronidazole  - Adult Dermatology  Referral; Future    Referred By: Lashawn Jhaveri MD  [address] [place] [address]     Allergy Tests:    Past Allergy Test    Order for Future Allergy Testing:    [] Outpatient  [] Inpatient: Law..../ Bed ....       Skin Atopy (atopic dermatitis) [x] Yes   [] No since childhood flexural dermatitis  Contact allergies:   [] Yes   [x] No ..........  Hand eczema:   [] Yes   [x] No           Leading hand:   [] R   [] L       [] Ambidextrous         Drug allergies:        [] Yes   [x] No  which?..for kidney reasons no Ibuprofen.    Urticaria/Angioedema  [x] Yes   [] No 5 years ago hives  Food Allergy:  [] Yes   [x] No  which?......  Pets :  [x] Yes   [] No  which?.cats and dogs at home        [x]  Rhinitis   [] Conjunctivitis   [x] Sinusitis   [] Polyposis   [] Otitis   [] Pharyngitis         [x]  Postnasal drip    []  None for many years wakes up with stuffy and Rhinosinusitis/PND in early spring and late fall  Operations:   [] Tonsils   [] Septum   [] Sinus   [] Polyposis        [x] Asthma bronchiale   [x] Coughing      []  None uses inhaler for wheezing at night (worse since 3 months ==> uses 2xdaily Albuterol 10pm and morning)  Symptoms (mostly Rhinoconjunctivitis and Asthma) aggravated by:  Season   [] I   [] II   [x] III   [x] IV   []V   []VI   []VII   []VIII   []IX   [x]X   [x]XI   []XII     [x] perennial   Day time      [x] morning   [] noon      [] evening        [] night    [] whole day........  []  none  Location/changes    [] inside        [] outside   [] mountains    [] sea     [] others.............   []  none  Triggers, specific     [] animals     [] plants     [] dust              [] others ...........................    []  none  Triggers, others       [] work          [] psyche    [] sport            [] others .............................  []  none  Irritant                [] phys efforts [] smoke    [] heat/cold     [] odors  []others............... []  none    Order for PATCH  TESTS  Reason for tests (suspected allergy): periorbital and facial eczema  Known previous allergies: none  Standardized panels  [x] Standard panel (40 tests)  [x] Preservatives & Antimicrobials (31 tests)  [x] Emulsifiers & Additives (25 tests)   [x] Perfumes/Flavours & Plants (25 tests)  [] Hairdresser panel (12 tests)  [] Rubber Chemicals (22 tests)  [] Plastics (26 tests)  [] Colorants/Dyes/Food additives (20 tests)  [] Metals (implants/dental) (24 tests)  [] Local anaesthetics/NSAIDs (13 tests)  [x] Antibiotics & Antimycotics (14 tests)   [] Corticosteroids (15 tests)   [] Photopatch test (62 tests)   [] others: ...      [x] Patient's own products: .Protopic and Desonide  DO NOT test if chemical or biological identity is unknown!   always ask from patient the product information and safety sheets (MSDS)       Order for PRICK TESTS    Reason for tests (suspected allergy): atopy?  Known previous allergies: n/a    Standardized prick panels  [x] Atopic panel (20 tests)  [] Pediatric Panel (12 tests)  [] Milk, Meat, Eggs, Grains (20 tests)   [] Dust, Epithelia, Feathers (10 tests)  [] Fish, Seafood, Shellfish (17 tests)  [] Nuts, Beans (14 tests)  [] Spice, Vegetable, Fruit (17 tests)  [] Pollen Panel = Tree, Grass, Weed (24 tests)  [] Others: ...      [] Patient's own products: ...  DO NOT test if chemical or biological identity is unknown!   always ask from patient the product information and safety sheets (MSDS)     Standardized intradermal tests  [] Penicillium notatum [] Aspergillus fumigatus [] House dust mites D.far & D. pteron  [] Cat    [] dog  [] Others: ...  [] Bee venom   [] Wasp venom  !!Specific protocol with dilutions!!       Order for Drug allergy tests (prick & Intradermal &  patch tests)    [] Penicillin G  [] Ampicillin [] Cefazolin   [] Ceftriaxone   [] Ceftazidime  [] Bactrim    [] Others: ...  Order for ... as test date    Atopy Screen (Placed Taco 10, 2024)  No Substance Readings (15 min)  Evaluation   POS Histamine 1mg/ml ++    NEG NaCl 0.9% -      No Substance Readings (15 min) Evaluation   1 Alternaria alternata (tenuis)  -    2 Cladosporium herbarum -    3 Aspergillus fumigatus -    4 Penicillium notatum -    5 Dermatophagoides pteronyssinus -    6 Dermatophagoides farinae -    7 Dog epithelium (canis spp) -    8 Cat hair (debbi catus) -    9 Cockroach   (Blatella americana & germanica) -    10 Grass mix midwest   (Nayely, Orchard, Redtop, Denis) ++    11 Kurt grass (sorghum halepense) -    12 Weed mix   (common Cocklebur, Lamb s quarters, rough redroot Pigweed, Dock/Sorrel) -    13 Mug wort (artemisia vulgare) -    14 Ragweed giant/short (ambrosia spp) -    15 White birch (Betula papyrifera) -    16 Tree mix 1 (Pecan, Maple BHR, Oak RVW, american [place], black Shepherd) -    17 Red cedar (juniperus virginia) -    18 Tree mix 2   (white Zain, river/red Birch, black [place], common Olivet, american Elm) -    19 Box elder/Maple mix (acer spp) -    20 Bingham shagbark (carya ovata) -           Conclusion       Intradermal Testing (Placed Taco 10, 2024)  No Substance Conc.  Reading (15min)  immediate Papule [mm] / Erythema [mm] Reading   (... days)  delayed Papule [mm] / Erythema [mm] Remarks   DF Standard Dust Mite - D. Farinae 1:10 -   -    DP Standard Dust Mite - D. Pteronyssinus 1:10 -   -    A Aspergillus fumigatus  1:10 -   -    P Penicillium notatum 1:10 -   -     Alternaria alt 1:10 +/++ 5/8  -     Dog epithelium 1:10 (+) 4/4  -     Cat epithelium 1:10 + 5/6      Conclusions:     ________________________________    Assessment & Plan:    ==> Final Diagnosis:     # recurrent subacute dermatitis face and periorbital  DDx allergic contact dermatitis and/or   Atopic dermatitis  {[id number]}    # atopic predisposition with  Wheezing at night and stuffy nose morning =DDx GERD or cat?  Aggravation with RS and PND early spring and late fall = immediate sensitization to Alternaria alt  Since  childhood flexural dermatitis  Clinically not relevant sensitization to grass pollens  {[id number]}      These conclusions are made at the best of one's knowledge and belief based on the provided evidence such as patient's history and allergy test results and they can change over time or can be incomplete because of missing information's.    ==> Treatment Plan:  >> symbicort inhaler 160/4.5 2xdaily and Albuterol in reserve  >> pft  >> plan patch tests for periorbital dermatitis  >> maybe sent patient later to GI evaluation for GERD (Famotidine not yet effective, but needs more time, PPIs not possible for kidney problem)    Procedures Performed: Allergy tests, including prick and intradermal tests     Staff:  Provider    Follow-up in Derm-Allergy clinic for patch tests as planned and evaluation if any delayed reaction to IDT    I spent a total of 40 minutes with Quan Davidson. This time was spent counseling the patient and/or coordinating care, explaining the allergy tests, performing allergy tests and assessing the clinical relevance.         Again, thank you for allowing me to participate in the care of your patient.        Sincerely,        Bebeto Vinson MD

## 2024-01-11 ENCOUNTER — TELEPHONE (OUTPATIENT)
Dept: ALLERGY | Facility: CLINIC | Age: 19
End: 2024-01-11
Payer: COMMERCIAL

## 2024-01-11 NOTE — TELEPHONE ENCOUNTER
Standardized panels  [x] Standard panel (40 tests)  [x] Preservatives & Antimicrobials (31 tests)  [x] Emulsifiers & Additives (25 tests)   [x] Perfumes/Flavours & Plants (25 tests)  [] Hairdresser panel (12 tests)  [] Rubber Chemicals (22 tests)  [] Plastics (26 tests)  [] Colorants/Dyes/Food additives (20 tests)  [] Metals (implants/dental) (24 tests)  [] Local anaesthetics/NSAIDs (13 tests)  [x] Antibiotics & Antimycotics (14 tests)   [] Corticosteroids (15 tests)   [] Photopatch test (62 tests)   [] others: ...                         [x] Patient's own products: .Protopic and Desonide  DO NOT test if chemical or biological identity is unknown!   always ask from patient the product information and safety sheets (MSDS)   STANDARD Series                                          # Substance 2 days 4 days remarks     1 Venkatesh Mix [C] - -       2 Colophony - -       3  2-Mercaptobenzothiazole  - -       4 Methylisothiazolinone - -       5 Carba Mix - -       6 Thiuram Mix [A] - -       7 Bisphenol A Epoxy Resin - -       8 B-Axyy-Vcbzablrlau-Formaldehyde Resin - -       9 Mercapto Mix [A] - -       10 Black Rubber Mix- PPD [B] - -       11 Potassium Dichromate  -  -       12 Balsam of [place] (Myroxylon Pereirae Resin) - -       13 Nickel Sulphate Hexahydrate - -       14 Mixed Dialkyl Thiourea - -       15 Paraben Mix [B] - -       16 Methyldibromo Glutaronitrile - -       17 Fragrance Mix 8% - -       18 2-Bromo-2-Nitropropane-1,3-Diol (Bronopol) CT - -       19 Lyral - -       20 Tixocortol-21- Pivalate CT - -       21 Diazolidinyl urea (Germall II) - -        22 Methyl Methacrylate - -       23 Cobalt (II) Chloride Hexahydrate - -       24 Fragrance Mix II  - -       25 Compositae Mix - -       26 Benzoyl Peroxide - -       27 Bacitracin - -       28 Formaldehyde - -       29 Methylchloroisothiazolinone / Methylisothiazolinone - -       30 Corticosteroid Mix CT - -       31 Sodium Lauryl Sulfate - -       32 Lanolin  Alcohol - -       33 Turpentine - -       34 Cetylstearylalcohol - -       35 Chlorhexidine Dicluconate - -       36 Budesonide - -       37 Imidazolidinyl Urea  - -       38 Ethyl-2 Cyanoacrylate - -       39 Quaternium 15 (Dowicil 200) - -       40 Decyl Glucoside - -      PRESERVATIVES & ANTIMICROBIALS        # Substance 2 days 4 days remarks   41 1  1,2-Benzisothiazoline-3-One, Sodium Salt - -     2  1,3,5-Corby (2-Hydroxyethyl) - Hexahydrotriazine (Grotan BK) - -     3 6-Yyfsctfaqxrdb-5-Nitro-1, 3-Propanediol - -     4  3, 4, 4' - Triclocarban - -    45 5 4 - Chloro - 3 - Cresol - -     6 4 - Chloro - 4 - Xylenol (PCMX) - -     7 7-Ethylbicyclooxazolidine (Bioban [id number]) - -     8 Benzalkonium Chloride CT - -     9 Benzyl Alcohol - -    50 10 Cetalkonium Chloride - -     11 Cetylpyrimidine Chloride  - -     12 Chloroacetamide - -     13 DMDM Hydantoin - -     14 Glutaraldehyde - -    55 15 Triclosan - -     16 Glyoxal Trimeric Dihydrate - -     17 Iodopropynyl Butylcarbamate - -     18 Octylisothiazoline - -     19 Bithionol CT - -    60 20 Bioban P 1487 (Nitrobutyl) Morpholine/(Ethylnitro-Trimethylene) Dimorpholine - -     21 Phenoxyethanol - -     22 Phenyl Salicylate - -     23 Povidone Iodine - -     24 Sodium Benzoate - -    65 25 Sodium Disulfite - -     26 Sorbic Acid - -     27 Thimerosal - -     28 Melamine Formaldehyde Resin - -     29 Ethylenediamine Dihydrochloride - -      Parabens      70 30 Butyl-P-Hydroxybenzoate - -     31 Ethyl-P-Hydroxybenzoate - -     32 Methyl-P-Hydroxybenzoate - -    73 33 Propyl-P-Hydroxybenzoate - -     EMULSIFIERS & ADDITIVES       # Substance 2 days 4 days remarks   74 1 Polyethylene Glycol-400 - -    75 2 Cocamidopropyl Betaine - -     3 Amerchol L101 - -     4 Propylene Glycol - -     5 Triethanolamine - -     6 Sorbitane Sesquiolate CT - -    80 7 Isopropylmyristate - -     8 Polysorbate 80 CT - -     9 Amidoamine   (Stearamidopropyl Dimethylamine) - -     10  Oleamidopropyl Dimethylamine - -     11 Lauryl Glucoside - -    85 12 Coconut Diethanolamide  - -     13 2-Hydroxy-4-Methoxy Benzophenone (Oxybenzone) - -     14 Benzophenone-4 (Sulisobenzon) - -     15 Propolis - -     16 Dexpanthenol - -    90 17 Abitol - -     18 Tert-Butylhydroquinone - -     19 Benzyl Salicylate - -     20 Dimethylaminopropylamin (DMPA) - -     21 Zinc Pyrithione (Zinc Omadine)  - -    95 22 Corby(Hydroxymethyl) Nitromethane  - -      Antioxidant       23 Dodecyl Gallate - -     24 Butylhydroxyanisole (BHA) - -     25 Butylhydroxytoluene (BHT) - -     26 Di-Alpha-Tocopherol (Vit E) - -    100 27 Propyl Gallate - -     PERFUMES, FLAVORS & PLANTS        # Substance 2 days 4 days remarks   101 1 Benzyl Cinnamate - -     2 Di-Limonene (Dipentene) - -     3 Cananga Odorata (Luis Smith) (I) - -     4 Lichen Acid Mix - -    105 5 Mentha Piperita Oil (Peppermint Oil) - -     6 Sesquiterpenelactone mix - -     7 Tea Tree Oil, Oxidized - -     8 Wood Tar Mix - -     9 Abietic Acid - -    110 10 Lavendula Angustifolia Oil (Lavender Oil) - -     11 Fragrance mix II CT * 14% - -      Fragrance Mix I       12 Oakmoss Absolute - -     13 Eugenol - -     14 Geraniol - -    115 15 Hydroxycitronellal - -     16 Isoeugenol - -     17 Cinnamic Aldehyde - -     18 Cinnamic Alcohol  - -      Fragrance mix II       19 Citronellol - -    120 20 Alpha-Hexylcinnamic Aldehyde    - -     21 Citral - -     22 Farnesol - -    123 23 Coumarin - -    Hexylcinnamic aldehyde, Coumarin, Farnesol, Hydroxyisohexy3-cyclohexene carboxaldehyde, citral, citrolellol   ANTIBIOTICS & ANTIMYCOTICS    # Substance 2 days 4 days remarks   124 1 Erythromycin - -    125 2 Framycetin Sulfate - -     3 Fusidic Acid Sodium Salt - -     4 Gentamicin Sulfate - -     5 Neomycin Sulfate - -     6 Oxytetracycline  - -    130 7 Polymyxin B Sulfate - -     8 Tetracycline-HCL - -     9 Sulfanilamide - -     10 Metronidazole - -     11 Nitrofurazone - -   "  135 12 Nystatin - -     13 Clotrimazole - -     14 Clioquinol 5% - -     15 Miconazole  - -    139 16 Tobramycine        OTHER PRODUCTS        # Substance Conc  % solv 2 days 4 days remarks    1          2          3          4          5          6          7          8          9          10           Patients own products:  è DO NOT test if chemical or biological identity is unknown!   - always ask from patient the product information and safety sheets and consult with the physician   - check neutral pH with pH indicator paper (do not test pH below 5 or over 8 or consult with physician)  - leave-on cosmetics can be tested \"as is\"  - rinse-off products have to be diluted with water, buffer, olive oil or paraffin (discuss with physician)  à remember:   - non-specific toxic/corrosive or biological reactions can occur  - tests with patients own products are not standardized and test conditions are not optimized for patch tests. Whenever possible test with standardized test series and correlate use of product with the result of the patch tests  - if not sure if compounds can be tested under occlusion in patch tests consider open application tests   Results of patch tests:                         Interpretation:  - Negative                    A    = Allergic      (+) Erythema    TI   = Toxic/irritant   + E + Infiltration    RaP = Relevance at Present     ++ E/I + Papulovesicle   Rpr  = Relevance Previously     +++ E/I/P + Blister     nR   = No Relevance          "

## 2024-01-11 NOTE — PROGRESS NOTES
"Video-Visit Details    Type of service:  Video Visit    Video Start Time: 10:40 AM  Video End Time: 10:52 AM    Originating Location (pt. Location): Home    Distant Location (provider location): Offsite (providers home) [facility] [place]     Platform used for Video Visit: PapayaMobile        Weight Management Nutrition Consultation    Quan \"Danie\" I oJey Davidson is a 18 year old male presents today for return weight management nutrition consultation.  Patient referred by Nirmala Torre PA-C on October 19, 2023.    Patient with Co-morbidities of obesity including:      10/12/2023     9:22 AM   --   I have the following health issues associated with obesity Pre-Diabetes    High Blood Pressure   I have the following symptoms associated with obesity Knee Pain    Back Pain    Fatigue    Hip Pain   Only has one kidney.     Anthropometrics:  Estimated body mass index is 54.92 kg/m  as calculated from the following:    Height as of 10/17/23: 1.613 m (5' 3.5\").    Weight as of 10/17/23: 142.9 kg (315 lb).    Current:  Estimated body mass index is 52.75 kg/m  as calculated from the following:    Height as of this encounter: 1.651 m (5' 5\").    Weight as of this encounter: 143.8 kg (317 lb). (+2 lbs from initial)       Medications for Weight Loss:  Naltrexone  Saxenda      NUTRITION HISTORY  Food allergies: None  Food intolerances: seafood  Vitamin/Mineral Supplements: None   Previous methods of diet modification for weight loss: Was 260lbs in high school. Was able to lose from 260lbs to 214lb wyatt year through going to the gym daily and low calorie diet. Was not sustainble through the pandemic, and regain weight. More recently had followed 36 hr fast followed by 12 h eating window, consistently for a month. Lost ~10-15 lbs while following. 2.5 years ago, stopped drinking all calorie-containing beverages. Also cut out eating chips and most desserts.   Tracked intake in guilherme called " "\"Nutricheck.\" Consuming 7736-8537 cole/day, protein:  gm, carb 192-220 gm.       Today - Injured shoulder which had significantly reduced his physical activity, however is feeling better now. He reports eating 3 meals daily, much smaller portions. No longer following multiple day fasts.       Recent Diet Recall:  Breakfast: 8 am apple   Lunch: 12-1 pm meat and occ rice and veggies; chicken potato carrot soup    Snack: apple   Dinner: 8 pm left-overs from lunch  Beverages: Water, milk with cereal   Alcohol: none   Dining Out: 1-2 times per week fast-food ([employer])     On days he is fasting will drink lots of water. In general drinking about 1 gallon daily.     Progress Towards Previous Goals:  1) Follow 2300 calorie/day plan - Not currently tracking so unaware of calorie intake  2) At least 80 gm protein daily, at most 150 gm/day. - Not currently tracking so unaware of average protein intake  3) Limit carb to 60-75 gm per meal, and less than 15 gm per snack. - Improved based on diet recall   4) Follow 16:8 time-restricted eating. Fasting for 16 hours, 8 hour eating window daily.  - Improved, no longer following multiple day fast      Physical Activity:  Pt describes as light. 3500 steps/day. Doing 100 push-up, sit-ups and curls.         10/12/2023     9:22 AM   Activity/Exercise History   How much of a typical 12 hour day do you spend sitting? Most of the Day   How much of a typical 12 hour day do you spend lying down? Less Than Half the Day   How much of a typical day do you spend walking/standing? Half the Day   How many hours (not including work) do you spend on the TV/Video Games/Computer/Tablet/Phone? 6 Hours or More   How many times a week are you active for the purpose of exercise? 6-7 Times a Week   What keeps you from being more active? Unsure What To Do    Other   How many total minutes do you spend doing some activity for the purpose of exercising when you exercise? 15-30 Minutes "       Nutrition Prescription  Recommended energy/nutrient modification.  2300 calories/day    Nutrition Diagnosis  Obesity r/t long history of positive energy balance aeb BMI >30. - Ongoing    Nutrition Intervention  Materials/education provided on hypocaloric diet for weight loss. Reviewed progress towards previous goals. Praised pt on adapting regular daily eating pattern, focusing on smaller portions and healthier food choices.  Encouraged nutritional tracking. Reviewed goal of 2300 calorie/day or less, Volumetric eating to help satiety level on fewer calories; portion control and healthy food choices, protein needs/sources.   Patient demonstrates understanding.  Co-developed goals to work towards.   Provided pt with list of goals and resources below via Invivodata.     Expected Engagement: good  Follow-Up Plans:      Nutrition Goals  1) Follow 2300 calorie/day plan. Consider tracking in an guilherme like Lose It guilherme.   2) At least 80 gm protein daily, at most 150 gm/day.   3) Limit carb to 60-75 gm per meal, and less than 15 gm per snack.     The Plate Method:  Plate method can be used for general guidance on balanced meals/portion sizes (see link below for picture/more information)                 Make 1/2 your plate non starchy vegetables (cauliflower, broccoli, asparagus, [place] sprouts, lettuce, carrots, for example).                3+ oz lean protein sources (salmon/skinless chicken/turkey breast/pork loin/lean cuts of beef/ or non-animal protein such as black, kidney or regalado beans, tofu/edamame/tempeh)    (Note: 3 oz = deck of cards size)                1/2 to 1 cup carbohydrate choices such as whole grain starches or starchy vegetables or fruit. For example: quinoa, brown rice, barley, potatoes, sweet potatoes, winter squash, peas, corn, or fruit.                Choose ~0-2 added fat servings at a meal (avocados, nut butters, nuts or seeds, olive oil, vegetable  oils).    Http://www.Appticles/[email or web address]    https://Appticles/916879.pdf    https://www.cdc.gov/diabetes/images/managing/Diabetes-Manage-Eat-Well-Plate-Graphic_600px.jpg    Protein Sources   http://Appticles/008818.pdf     Carbohydrates  http://fvfiles.com/[email or web address]     Mindful Eating  http://Appticles/463882.pdf     Summary of Volumetrics Eating Plan  http://[email or web address]     Meal Replacement Shake Options:   *Protein Shake Criteria: no more than 210 Calories, at least 20 grams of protein, and less than 10 grams of sugar   [facility] smoothie (160 Calories, 20 g protein)   Premier Protein (160 Calories, 30 g protein)  Slim Fast Advanced Nutrition (180 Calories, 20 g protein)  Muscle Milk, lactose-free, 17 oz bottle (210 Calories, 30 g protein)  Integrated Supplements, no artificial sugars (110 Calories, 20 g protein)  Boost/Ensure Max (160 calories, 30 gm protein)   Fairlife Protein Shakes (160-230 calories, 26-42 gm protein)  Aldi's Elevation Protein Powder (180 calories, 30 gm protein)   Orgain Protein Shakes (130-160 calories, 20-26 gm protein)     Meal Replacement Bar Options:  [facility] Protein Shake (160 Calories, 15 g protein)  Quest Protein Bars (190 Calories, 20 g protein)  Built Bar (170 Calories, 15-20 g protein)  One Protein Bar (210 calories, 20 g protein)  [place] Signature Protein Bar (Costco) (190 Calories, 21 g protein)  Pure Protein Bars (180 Calories, 21 g protein)    Low Calorie Frozen Meal:  Healthy Choice Power Bowls  Lean Cuisine  Smart Ones  Danilo Olivares    At Home Exercise Resources  ACE Fitness - Exercises by Muscle Group  https://www.acefitness.org/resources/everyone/exercise-library/    Plus Size Beginner Workout - Standing (low impact)  https://www.Evolv Technologiesube.com/watch?v=8IaNJMd3WUM      Plus Size Full Body (created by a woman who struggled to find videos that were inclusive to larger bodies so she decided to make her own!)    https://www.Recruit.netube.com/watch?v=2CeRF5g-zt2     Standing Core Workout for Beginners   https://www.Recruit.netube.com/watch?v=Z44a4qxpm34     Dance Workouts: The Fitness Liborio   https://www.Togic Software.9sky.com/user/TheWillieMarlorraine    HIIT Workouts: PopSuNubee Fitness  https://www.Recruit.netube.9sky.com/user/popsugartvfit    Pilates: Blogilates  https://www.Togic Software.9sky.com/user/blogilates    Yoga: Yoga with Suma   https://www.Togic Software.9sky.com/user/yogawithadriene/featured    Strength Training: HASfit  https://www.Recruit.netube.com/channel/VVSXO5-ALYCv29ZB-d4YRviX    Handouts Relating to Exercise :    1.     Learning About Being Physically Active     2.      Muscle Conditionin Exercises    3.     Resistance Training with Free Weights: 3 Exercises    4.      Resistance Training with Surgical Tubin Exercises    5.     Stretchin Exercises    6.     Seated Exercises for Arms and Legs: 11 Exercises          Follow-Up:  1-2 months, PRN    Time spent with patient: 12 minutes.  Montserrat Nazario RD, LD

## 2024-01-12 ENCOUNTER — VIRTUAL VISIT (OUTPATIENT)
Dept: ENDOCRINOLOGY | Facility: CLINIC | Age: 19
End: 2024-01-12
Payer: COMMERCIAL

## 2024-01-12 VITALS — HEIGHT: 65 IN | BODY MASS INDEX: 52.48 KG/M2 | WEIGHT: 315 LBS

## 2024-01-12 DIAGNOSIS — Z71.3 NUTRITIONAL COUNSELING: ICD-10-CM

## 2024-01-12 DIAGNOSIS — E66.813 CLASS 3 SEVERE OBESITY WITH SERIOUS COMORBIDITY AND BODY MASS INDEX (BMI) OF 50.0 TO 59.9 IN ADULT, UNSPECIFIED OBESITY TYPE (H): Primary | ICD-10-CM

## 2024-01-12 DIAGNOSIS — E66.01 CLASS 3 SEVERE OBESITY WITH SERIOUS COMORBIDITY AND BODY MASS INDEX (BMI) OF 50.0 TO 59.9 IN ADULT, UNSPECIFIED OBESITY TYPE (H): Primary | ICD-10-CM

## 2024-01-12 PROCEDURE — 97803 MED NUTRITION INDIV SUBSEQ: CPT | Mod: 95 | Performed by: DIETITIAN, REGISTERED

## 2024-01-12 PROCEDURE — 99207 PR NO CHARGE LOS: CPT | Mod: 95 | Performed by: DIETITIAN, REGISTERED

## 2024-01-12 ASSESSMENT — PAIN SCALES - GENERAL: PAINLEVEL: NO PAIN (0)

## 2024-01-12 NOTE — PATIENT INSTRUCTIONS
Hi Jose Daniel,    Follow-up with RD on 2/20    Thank you,    Montserrat Nazario, RD, LD  If you would like to schedule or reschedule an appointment with the RD, please call [phone]    Nutrition Goals  1) Follow 2300 calorie/day plan. Consider tracking in an guilherme like Health Integrated It guilherme.   2) At least 80 gm protein daily, at most 150 gm/day.   3) Limit carb to 60-75 gm per meal, and less than 15 gm per snack.     The Plate Method:  Plate method can be used for general guidance on balanced meals/portion sizes (see link below for picture/more information)                 Make 1/2 your plate non starchy vegetables (cauliflower, broccoli, asparagus, [place] sprouts, lettuce, carrots, for example).                3+ oz lean protein sources (salmon/skinless chicken/turkey breast/pork loin/lean cuts of beef/ or non-animal protein such as black, kidney or regalado beans, tofu/edamame/tempeh)    (Note: 3 oz = deck of cards size)                1/2 to 1 cup carbohydrate choices such as whole grain starches or starchy vegetables or fruit. For example: quinoa, brown rice, barley, potatoes, sweet potatoes, winter squash, peas, corn, or fruit.                Choose ~0-2 added fat servings at a meal (avocados, nut butters, nuts or seeds, olive oil, vegetable oils).    Http://www.Woodpecker Education/004990.pdf    https://Woodpecker Education/373282.pdf    https://www.cdc.gov/diabetes/images/managing/Diabetes-Manage-Eat-Well-Plate-Graphic_600px.jpg    Protein Sources   http://Woodpecker Education/958848.pdf     Carbohydrates  http://fvfiles.com/418253.pdf     Mindful Eating  http://Woodpecker Education/091666.pdf     Summary of Volumetrics Eating Plan  http://fvfiles.com/[email or web address]     Meal Replacement Shake Options:   *Protein Shake Criteria: no more than 210 Calories, at least 20 grams of protein, and less than 10 grams of sugar   [facility] smoothie (160 Calories, 20 g protein)   Premier Protein (160 Calories, 30 g protein)  Slim Fast Advanced Nutrition (180 Calories, 20 g  protein)  Muscle Milk, lactose-free, 17 oz bottle (210 Calories, 30 g protein)  Integrated Supplements, no artificial sugars (110 Calories, 20 g protein)  Boost/Ensure Max (160 calories, 30 gm protein)   Fairlife Protein Shakes (160-230 calories, 26-42 gm protein)  Aldi's Elevation Protein Powder (180 calories, 30 gm protein)   Orgain Protein Shakes (130-160 calories, 20-26 gm protein)     Meal Replacement Bar Options:  [facility] Protein Shake (160 Calories, 15 g protein)  Quest Protein Bars (190 Calories, 20 g protein)  Built Bar (170 Calories, 15-20 g protein)  One Protein Bar (210 calories, 20 g protein)  [place] Signature Protein Bar (Costco) (190 Calories, 21 g protein)  Pure Protein Bars (180 Calories, 21 g protein)    Low Calorie Frozen Meal:  Healthy Choice Power Bowls  Lean Cuisine  Smart Ones  Danilo Olivares    At Home Exercise Resources  ACE Fitness - Exercises by Muscle Group  https://www.acefitness.org/resources/everyone/exercise-library/    Plus Size Beginner Workout - Standing (low impact)  https://www.Stylecrookube.com/watch?v=3UtWKEf0RYM      Plus Size Full Body (created by a woman who struggled to find videos that were inclusive to larger bodies so she decided to make her own!)   https://www.Stylecrookube.com/watch?v=6BoAR4o-yh1     Standing Core Workout for Beginners   https://www.Stylecrookube.com/watch?v=T91n2sdsi21     Dance Workouts: The Willie Capone   https://www.Stylecrookube.com/user/Sailaja    HIIT Workouts: PopSuBlue Calypso Fitness  https://www.Stylecrookube.com/user/popsugartvfit    Pilates: Blogilates  https://www.Stylecrookube.com/user/blogilates    Yoga: Yoga with Suma   https://www.Stylecrookube.com/user/yogawithadriene/featured    Strength Training: HASfit  https://www.Stylecrookube.com/channel/MJCJG9-VUIAk10YH-a9JKscI    Handouts Relating to Exercise :    1.     Learning About Being Physically Active     2.      Muscle Conditionin Exercises    3.     Resistance Training with Free Weights: 3 Exercises    4.       Resistance Training with Surgical Tubin Exercises    5.     Stretchin Exercises    6.     Seated Exercises for Arms and Legs: 11 Exercises        COMPREHENSIVE WEIGHT MANAGEMENT PROGRAM  VIRTUAL SUPPORT GROUPS    At [facility], our Comprehensive Weight Management program offers on-line support groups for patients who are working on weight loss and considering, preparing for, or have had weight loss surgery.     There is no cost for this opportunity.  You are invited to attend the?Virtual Support Groups?provided by any of the following locations:    [facility] via Microsoft Teams with Karen Hendrix RN  2.   [facility] via InCights Mobile Solutions with Bebeto Liu, PhD, LP  3.   [facility] via In[employer]ights Mobile [employer] with Brianda Short RN  4.   [facility] via a Zoom Meeting with JESÚS Rose    The following Support Group information can also be found on our website:  [email or web address]-loss-[email or web address]-groups      [facility] Weight Loss Surgery Support Group  The support group is a patient-lead forum that meets monthly to share experiences, encouragement and education. It is open to those who have had weight loss surgery, are scheduled for surgery, or are considering surgery.   WHEN: This group meets on the  of each month from 5:00PM - 6:00PM virtually using Microsoft Teams.   FACILITATOR: Led by Karen Abdullahi RD, LD, RN, the program's Clinical Coordinator.   TO REGISTER: Please contact the clinic via Wiren Board or call the nurse line directly at [phone] to inform our staff that you would like an invite sent to you and to let us know the email you would like the invite sent to. Prior to the meeting, a link with directions on how to join the meeting will be sent to you.     and  Meetings   December 20  January 17  February 21  March 20  April 17  May 15  Nayely 19      [facility] and  "[facility] Bariatric Care Support Group?  This is open to all pre- and post- operative bariatric surgery patients as well as their support system.   WHEN: This group meets the 3rd Tuesday of each month from 6:30 PM - 8:00 PM virtually using Microsoft Teams.   FACILITATOR: Led by Bebeto Liu, Ph.D who is a Licensed Psychologist with the [facility] Comprehensive Weight Management Program.   TO REGISTER: Please send an email to Bebeto Liu, Ph.D.,  at?[email or web address]?if you would like an invitation to the group. Prior to the meeting, a link with directions on how to join the meeting will be sent to you.    2023 and 2024 Meetings  December 19 January 16: \"Medication Management and Bariatric Surgery\", Ramya Chaparro, PharmD, Pharmacy Resident at [facility]  February 20: \"A Bariatric Surgery Patient's Perspective\", WILI Farrar, [facility], Behavioral Health Clinician at [facility]  March 19  April 16  May 21  Nayely 18: \"Nutritional Labeling\", Dietitian speaker to be announced.  November 19: \"Holiday Eating\", Dietitian speaker to be announced.    [facility] and [facility] Post-Operative Bariatric Surgery Support Group  This is a support group for [facility] bariatric patients (and those external to [facility]) who have had bariatric surgery and are at least 3 months post-surgery.  WHEN: This support group meets the 4th Wednesday of the month from 11:00 AM - 12:00 PM virtually using Microsoft Teams.   FACILITATOR: Led by Certified Bariatric Nurse, Brianda Short RN.   TO REGISTER: Please send an email to Brianda at [email or web address] if you would like an invitation to the group.  Prior to the meeting, a link with directions on how to join the meeting will be sent to you.    2023 and 2024 Meetings  December 27  January 24  February 28  March 27  April 24  May 22  Nayely " "26    [facility] Healthy Lifestyle Group?  This is a 60 minute virtual coaching group for those who want to lead a healthier lifestyle. Come together to set goals and overcome barriers in a supportive group environment. We will address the four pillars of health: nutrition, exercise, sleep and emotional well-being.  This group is highly recommended for those who are participating in the 24 week Healthy Lifestyle Plan and our Health Coaching sessions.  WHEN: This group meets the 1st Friday of the month, 12:30 PM - 1:30 PM online, via a zoom meeting.    FACILITATOR: Led by National Board Certified Health and , Brianda [facility].   TO REGISTER: Please call the Call Center at [phone] to register.  You will get an appointment to attend in [place]. Fifteen minutes prior to the meeting, complete the e-check in and you will get the link to join the meeting.    There is no charge to attend this group and space is limited.     2023 and 2024 Meetings  December 1: \"Let's Talk\" (guided discussion on our wins and challenges)  January 5: \"New Years Vision: Manifest your Best 2024!\" (guided imagery,  journaling and discussion)  February 2: \"Let's Talk\"  March 1: \"10 Percent Happier\" by Juan Jose Munoz (Book Bites - a guided discussion on the nuggets of wisdom from favorite wellness books, no need to read the book but highly encouraged)  April 5: \"Let's Talk\"  May 3: \"Essentialism: The Disciplined Pursuit of Less\" by Glen Alex (Book Bites discussion)  June 7: \"Let's Talk\"  July 5: NO MEETING, off for the 4th of July Holiday  August 2: \"The Blue Zones, Secrets for Living a Longer Life\" by Juan Jose Hopkins (Book Bites discussion)                    "

## 2024-01-12 NOTE — LETTER
"1/12/2024       RE: Quan Davidson  [address]  [place] [address]     Dear Colleague,    Thank you for referring your patient, Quan Davidson, to the [facility] [place] at [facility]. Please see a copy of my visit note below.    Video-Visit Details    Type of service:  Video Visit    Video Start Time: 10:40 AM  Video End Time: 10:52 AM    Originating Location (pt. Location): Home    Distant Location (provider location): Offsite (providers home) [facility] [place]     Platform used for Video Visit: GATHER & SAVE        Weight Management Nutrition Consultation    Quan \"Danie\" FLORA Davidson is a 18 year old male presents today for return weight management nutrition consultation.  Patient referred by Nirmala Torre PA-C on October 19, 2023.    Patient with Co-morbidities of obesity including:      10/12/2023     9:22 AM   --   I have the following health issues associated with obesity Pre-Diabetes    High Blood Pressure   I have the following symptoms associated with obesity Knee Pain    Back Pain    Fatigue    Hip Pain   Only has one kidney.     Anthropometrics:  Estimated body mass index is 54.92 kg/m  as calculated from the following:    Height as of 10/17/23: 1.613 m (5' 3.5\").    Weight as of 10/17/23: 142.9 kg (315 lb).    Current:  Estimated body mass index is 52.75 kg/m  as calculated from the following:    Height as of this encounter: 1.651 m (5' 5\").    Weight as of this encounter: 143.8 kg (317 lb). (+2 lbs from initial)       Medications for Weight Loss:  Naltrexone  Saxenda      NUTRITION HISTORY  Food allergies: None  Food intolerances: seafood  Vitamin/Mineral Supplements: None   Previous methods of diet modification for weight loss: Was 260lbs in high school. Was able to lose from 260lbs to 214lb wyatt year through going to the gym daily and low calorie diet. Was not " "sustainble through the pandemic, and regain weight. More recently had followed 36 hr fast followed by 12 h eating window, consistently for a month. Lost ~10-15 lbs while following. 2.5 years ago, stopped drinking all calorie-containing beverages. Also cut out eating chips and most desserts.   Tracked intake in guilherme called \"Nutricheck.\" Consuming 9306-2713 cole/day, protein:  gm, carb 192-220 gm.       Today - Injured shoulder which had significantly reduced his physical activity, however is feeling better now. He reports eating 3 meals daily, much smaller portions. No longer following multiple day fasts.       Recent Diet Recall:  Breakfast: 8 am apple   Lunch: 12-1 pm meat and occ rice and veggies; chicken potato carrot soup    Snack: apple   Dinner: 8 pm left-overs from lunch  Beverages: Water, milk with cereal   Alcohol: none   Dining Out: 1-2 times per week fast-food ([employer])     On days he is fasting will drink lots of water. In general drinking about 1 gallon daily.     Progress Towards Previous Goals:  1) Follow 2300 calorie/day plan - Not currently tracking so unaware of calorie intake  2) At least 80 gm protein daily, at most 150 gm/day. - Not currently tracking so unaware of average protein intake  3) Limit carb to 60-75 gm per meal, and less than 15 gm per snack. - Improved based on diet recall   4) Follow 16:8 time-restricted eating. Fasting for 16 hours, 8 hour eating window daily.  - Improved, no longer following multiple day fast      Physical Activity:  Pt describes as light. 3500 steps/day. Doing 100 push-up, sit-ups and curls.         10/12/2023     9:22 AM   Activity/Exercise History   How much of a typical 12 hour day do you spend sitting? Most of the Day   How much of a typical 12 hour day do you spend lying down? Less Than Half the Day   How much of a typical day do you spend walking/standing? Half the Day   How many hours (not including work) do you spend on the TV/Video " Games/Computer/Tablet/Phone? 6 Hours or More   How many times a week are you active for the purpose of exercise? 6-7 Times a Week   What keeps you from being more active? Unsure What To Do    Other   How many total minutes do you spend doing some activity for the purpose of exercising when you exercise? 15-30 Minutes       Nutrition Prescription  Recommended energy/nutrient modification.  2300 calories/day    Nutrition Diagnosis  Obesity r/t long history of positive energy balance aeb BMI >30. - Ongoing    Nutrition Intervention  Materials/education provided on hypocaloric diet for weight loss. Reviewed progress towards previous goals. Praised pt on adapting regular daily eating pattern, focusing on smaller portions and healthier food choices.  Encouraged nutritional tracking. Reviewed goal of 2300 calorie/day or less, Volumetric eating to help satiety level on fewer calories; portion control and healthy food choices, protein needs/sources.   Patient demonstrates understanding.  Co-developed goals to work towards.   Provided pt with list of goals and resources below via [employer].     Expected Engagement: good  Follow-Up Plans:      Nutrition Goals  1) Follow 2300 calorie/day plan. Consider tracking in an guilherme like Lose It guilherme.   2) At least 80 gm protein daily, at most 150 gm/day.   3) Limit carb to 60-75 gm per meal, and less than 15 gm per snack.     The Plate Method:  Plate method can be used for general guidance on balanced meals/portion sizes (see link below for picture/more information)                 Make 1/2 your plate non starchy vegetables (cauliflower, broccoli, asparagus, [place] sprouts, lettuce, carrots, for example).                3+ oz lean protein sources (salmon/skinless chicken/turkey breast/pork loin/lean cuts of beef/ or non-animal protein such as black, kidney or regalado beans, tofu/edamame/tempeh)    (Note: 3 oz = deck of cards size)                1/2 to 1 cup carbohydrate choices such as  whole grain starches or starchy vegetables or fruit. For example: quinoa, brown rice, barley, potatoes, sweet potatoes, winter squash, peas, corn, or fruit.                Choose ~0-2 added fat servings at a meal (avocados, nut butters, nuts or seeds, olive oil, vegetable oils).    Http://www.Global Crossing/626792.pdf    https://Global Crossing/151635.pdf    https://www.cdc.gov/diabetes/images/managing/Diabetes-Manage-Eat-Well-Plate-Graphic_600px.jpg    Protein Sources   http://Global Crossing/315899.pdf     Carbohydrates  [email or web address]://[email or web address]     Mindful Eating  http://Global Crossing/198155.pdf     Summary of Volumetrics Eating Plan  [email or web address]     Meal Replacement Shake Options:   *Protein Shake Criteria: no more than 210 Calories, at least 20 grams of protein, and less than 10 grams of sugar   [facility] smoothie (160 Calories, 20 g protein)   Premier Protein (160 Calories, 30 g protein)  Slim Fast Advanced Nutrition (180 Calories, 20 g protein)  Muscle Milk, lactose-free, 17 oz bottle (210 Calories, 30 g protein)  Integrated Supplements, no artificial sugars (110 Calories, 20 g protein)  Boost/Ensure Max (160 calories, 30 gm protein)   Fairlife Protein Shakes (160-230 calories, 26-42 gm protein)  Aldi's Elevation Protein Powder (180 calories, 30 gm protein)   Orgain Protein Shakes (130-160 calories, 20-26 gm protein)     Meal Replacement Bar Options:  [facility] Protein Shake (160 Calories, 15 g protein)  Quest Protein Bars (190 Calories, 20 g protein)  Built Bar (170 Calories, 15-20 g protein)  One Protein Bar (210 calories, 20 g protein)  Newberry Signature Protein Bar ([employer]) (190 Calories, 21 g protein)  Pure Protein Bars (180 Calories, 21 g protein)    Low Calorie Frozen Meal:  Healthy Choice Power Bowls  Lean Cuisine  Smart Ones  Danilo Olivares    At Home Exercise Resources  ACE Fitness - Exercises by Muscle  Group  https://www.acefitness.org/resources/everyone/exercise-library/    Plus Size Beginner Workout - Standing (low impact)  https://www.SecureOne Data Solutionsube.com/watch?v=5TcNCLa0MSA      Plus Size Full Body (created by a woman who struggled to find videos that were inclusive to larger bodies so she decided to make her own!)   https://www.SecureOne Data Solutionsube.com/watch?v=0AzKN5q-oh3     Standing Core Workout for Beginners   https://www.SecureOne Data Solutionsube.com/watch?v=T26q9uunv35     Dance Workouts: The Fitness Liborio   https://www.Kozio.I Read Books/user/TheFitSt. Vincent Anderson Regional HospitalMarEvangel[facility]    HIIT Workouts: PopSugar Fitness  https://www.Kozio.I Read Books/user/popsugartvfit    Pilates: Blogilates  https://www.Kozio.I Read Books/user/blogilates    Yoga: Yoga with Suma   https://www.Kozio.[employer] Read Books/user/yogawithadriene/featured    Strength Training: HASfit  https://www.Kozio.com/channel/[email or web address]-[email or web address]-[email or web address]    Handouts Relating to Exercise :    1.     Learning About Being Physically Active     2.      Muscle Conditionin Exercises    3.     Resistance Training with Free Weights: 3 Exercises    4.      Resistance Training with Surgical Tubin Exercises    5.     Stretchin Exercises    6.     Seated Exercises for Arms and Legs: 11 Exercises          Follow-Up:  1-2 months, PRN    Time spent with patient: 12 minutes.  Montserrat Nazario RD, LD

## 2024-01-12 NOTE — NURSING NOTE
Is the patient currently in the state of [place]? YES    Visit mode:VIDEO    If the visit is dropped, the patient can be reconnected by: VIDEO VISIT: Text to cell phone:   Telephone Information:   Mobile [phone]       Will anyone else be joining the visit? NO  (If patient encounters technical issues they should call [phone]    How would you like to obtain your AVS? MyChart    Are changes needed to the allergy or medication list? No    Reason for visit: RECHECK    Ezekiel VEGA

## 2024-01-14 NOTE — PROGRESS NOTES
[facility] Dermato-allergology Note  Office visit  Encounter Date: Taco 15, 2024  ____________________________________________    CC: No chief complaint on file.      HPI:  (Taco 15, 2024)  Mr. Quan Davidson is a(n) 18 year old male who presents today as a return patient for allergy tests as planned  - Follow-up in Derm-Allergy clinic for patch tests as planned and evaluation if any delayed reaction to IDT  - otherwise feeling well in usual state of health    Physical exam:  General: In no acute distress, well-developed, well-nourished  Eyes: no conjunctivitis  ENT: no signs of rhinitis   Pulmonary: no wheezing or coughing  Skin: Focused examination of the skin on test sites was performed = see test results below  No active eczematous skin lesions on tests sites, particularly back    Earlier History and Allergy exams:  (Taco 10, 2024)  - sent by PCP for recurrent dermatitis in face and periorbital and flexural areas of left arm and now still hyperpigmemtations periorbital and on arms.   - Started about 8 months ago and since 4 months much less problems.  - uses several times daily Hydrocortisone cream and if he doesn't use it the rash comes back  - only used Protopic one time, because it was burning a lot    Skin: in a photo from 2023 desquamation and lichenification around the left eye with some papules --> subacute eczema, not really Rosacea    Past Medical History:   Patient Active Problem List   Diagnosis    Elevated blood pressure    Pre-diabetes    Class 3 severe obesity with serious comorbidity and body mass index (BMI) of 50.0 to 59.9 in adult (H)     Past Medical History:   Diagnosis Date    Glaucoma (increased eye pressure)     Term birth of male      Traumatic brain injury (H) 2014    UPJ (ureteropelvic junction) obstruction 03/10/2015    UPJ obstruction, congenital 2014    identified on imaging for unrelated trauma       Allergies:  Allergies    Allergen Reactions    Ibuprofen        Medications:  Current Outpatient Medications   Medication    albuterol (PROAIR HFA/PROVENTIL HFA/VENTOLIN HFA) 108 (90 Base) MCG/ACT inhaler    budesonide-formoterol (SYMBICORT) 160-4.5 MCG/ACT Inhaler    cyclobenzaprine (FLEXERIL) 10 MG tablet    insulin pen needle (31G X 5 MM) 31G X 5 MM miscellaneous    liraglutide - Weight Management (SAXENDA) 18 MG/3ML pen    naltrexone (DEPADE/REVIA) 50 MG tablet     No current facility-administered medications for this visit.       Social History:  The patient works in the moment not (just got high school diploma). Patient has the following hobbies or non-occupational exposure: music (singer)    Family History:  Family History   Problem Relation Age of Onset    Diabetes Father     Hypertension Father    Younger brother has generalized eczema    Previous Labs, Allergy Tests, Dermatopathology, Imaging:  Consult by Dr. Jhaveri June 2023    Assessment & Plan:   # Eyelid dermatitis, suspect ACD  # Mild flexural eczema, keratosis pilaris, and rhinitis likely atopic predisposition   Physical exam as below likely consistent with a allergic contact dermatitis given eyelid involvement.  Likely has a background  of atopic dermatitis given other concurrent findings and symptoms.  Etiology natural history of this condition was discussed with the patient as well as treatment options and the rationale for use.  We will start topical therapies including desonide ointment twice daily for the next 2 weeks.  Side effects of steroids discussed including risk of glaucoma and cataracts with prolonged use.  We will then transition to topical tacrolimus thereafter with repeat use of desonide as needed.  Additionally allergy referral was placed for further evaluation and work-up.   - desonide (DESOWEN) 0.05 % external ointment; Apply topically 2 times daily  Dispense: 60 g; Refill: 1  - tacrolimus (PROTOPIC) 0.1 % external ointment; Apply topically 2 times daily   Dispense: 60 g; Refill: 1  - stop doxycycline, stop metronidazole  - Adult Dermatology Referral; Future    Referred By: Lashawn Jhaveri MD  [id number] [place] [address]     Allergy Tests:    Past Allergy Test    Order for Future Allergy Testing:    [] Outpatient  [] Inpatient: Law..../ Bed ....       Skin Atopy (atopic dermatitis) [x] Yes   [] No since childhood flexural dermatitis  Contact allergies:   [] Yes   [x] No ..........  Hand eczema:   [] Yes   [x] No           Leading hand:   [] R   [] L       [] Ambidextrous         Drug allergies:        [] Yes   [x] No  which?..for kidney reasons no Ibuprofen.    Urticaria/Angioedema  [x] Yes   [] No 5 years ago hives  Food Allergy:  [] Yes   [x] No  which?......  Pets :  [x] Yes   [] No  which?.cats and dogs at home        [x]  Rhinitis   [] Conjunctivitis   [x] Sinusitis   [] Polyposis   [] Otitis   [] Pharyngitis         [x]  Postnasal drip    []  None for many years wakes up with stuffy and Rhinosinusitis/PND in early spring and late fall  Operations:   [] Tonsils   [] Septum   [] Sinus   [] Polyposis        [x] Asthma bronchiale   [x] Coughing      []  None uses inhaler for wheezing at night (worse since 3 months ==> uses 2xdaily Albuterol 10pm and morning)  Symptoms (mostly Rhinoconjunctivitis and Asthma) aggravated by:  Season   [] I   [] II   [x] III   [x] IV   []V   []VI   []VII   []VIII   []IX   [x]X   [x]XI   []XII     [x] perennial   Day time      [x] morning   [] noon      [] evening        [] night    [] whole day........  []  none  Location/changes    [] inside        [] outside   [] mountains    [] sea     [] others.............   []  none  Triggers, specific     [] animals     [] plants     [] dust              [] others ...........................    []  none  Triggers, others       [] work          [] psyche    [] sport            [] others .............................  []  none  Irritant                [] phys efforts []  smoke    [] heat/cold     [] odors  []others............... []  none    Order for PATCH TESTS  Reason for tests (suspected allergy): periorbital and facial eczema  Known previous allergies: none  Standardized panels  [x] Standard panel (40 tests)  [x] Preservatives & Antimicrobials (31 tests)  [x] Emulsifiers & Additives (25 tests)   [x] Perfumes/Flavours & Plants (25 tests)  [] Hairdresser panel (12 tests)  [] Rubber Chemicals (22 tests)  [] Plastics (26 tests)  [] Colorants/Dyes/Food additives (20 tests)  [] Metals (implants/dental) (24 tests)  [] Local anaesthetics/NSAIDs (13 tests)  [x] Antibiotics & Antimycotics (14 tests)   [] Corticosteroids (15 tests)   [] Photopatch test (62 tests)   [] others: ...      [x] Patient's own products: .Protopic and Desonide  DO NOT test if chemical or biological identity is unknown!   always ask from patient the product information and safety sheets (MSDS)       Order for PRICK TESTS    Reason for tests (suspected allergy): atopy?  Known previous allergies: n/a    Standardized prick panels  [x] Atopic panel (20 tests)  [] Pediatric Panel (12 tests)  [] Milk, Meat, Eggs, Grains (20 tests)   [] Dust, Epithelia, Feathers (10 tests)  [] Fish, Seafood, Shellfish (17 tests)  [] Nuts, Beans (14 tests)  [] Spice, Vegetable, Fruit (17 tests)  [] Pollen Panel = Tree, Grass, Weed (24 tests)  [] Others: ...      [] Patient's own products: ...  DO NOT test if chemical or biological identity is unknown!   always ask from patient the product information and safety sheets (MSDS)     Standardized intradermal tests  [] Penicillium notatum [] Aspergillus fumigatus [] House dust mites D.far & D. pteron  [] Cat    [] dog  [] Others: ...  [] Bee venom   [] Wasp venom  !!Specific protocol with dilutions!!       Order for Drug allergy tests (prick & Intradermal &  patch tests)    [] Penicillin G  [] Ampicillin [] Cefazolin   [] Ceftriaxone   [] Ceftazidime  [] Bactrim    [] Others: ...  Order for ...  as test date    Atopy Screen (Placed Taco 10, 2024)  No Substance Readings (15 min) Evaluation   POS Histamine 1mg/ml ++    NEG NaCl 0.9% -      No Substance Readings (15 min) Evaluation   1 Alternaria alternata (tenuis)  -    2 Cladosporium herbarum -    3 Aspergillus fumigatus -    4 Penicillium notatum -    5 Dermatophagoides pteronyssinus -    6 Dermatophagoides farinae -    7 Dog epithelium (canis spp) -    8 Cat hair (debbi catus) -    9 Cockroach   (Blatella americana & germanica) -    10 Grass mix midwest   (Nayely, Orchard, [place], Denis) ++    11 Kurt grass (sorghum halepense) -    12 Weed mix   (common Cocklebur, Lamb s quarters, rough redroot Pigweed, Dock/Sorrel) -    13 Mug wort (artemisia vulgare) -    14 Ragweed giant/short (ambrosia spp) -    15 White birch (Betula papyrifera) -    16 Tree mix 1 (Pecan, Maple BHR, Oak RVW, american [place], black [place]) -    17 Red cedar (juniperus virginia) -    18 Tree mix 2   (white [place], river/red Birch, black [place], common [place], american Elm) -    19 Box elder/Maple mix (acer spp) -    20 [place] shagbark (carya ovata) -           Conclusion       Intradermal Testing (Placed Taco 10, 2024)  No Substance Conc.  Reading (15min)  immediate Papule [mm] / Erythema [mm] Reading   (5 days)  delayed Papule [mm] / Erythema [mm] Remarks   DF Standard Dust Mite - D. Farinae 1:10 -  + 5/5    DP Standard Dust Mite - D. Pteronyssinus 1:10 -  (+) 2/2    A Aspergillus fumigatus  1:10 -   -    P Penicillium notatum 1:10 -   -     Alternaria alt 1:10 +/++ 5/8  -     Dog epithelium 1:10 (+) 4/4  -     Cat epithelium 1:10 + 5/6      Conclusions:     ________________________________  RESULTS & EVALUATION of PATCH TESTS    Taco 15, 2024 application of patch tests:    Patch test readings after     [x] 2 days, [] 3 days [x] 4 days, [] 5 days,  Other duration: ...    STANDARD Series                                          # Substance 2 days 4 days remarks     1 Venkatesh Hearn [C] -  -       2 Colophony - -       3  2-Mercaptobenzothiazole  - -       4 Methylisothiazolinone - -       5 Carba Mix - -       6 Thiuram Mix [A] - -       7 Bisphenol A Epoxy Resin - -       8 P-Vccu-Ahasxzlvlam-Formaldehyde Resin - -       9 Mercapto Mix [A] - -       10 Black Rubber Mix- PPD [B] - -       11 Potassium Dichromate  -  -       12 Balsam of [place] (Myroxylon Pereirae Resin) - -       13 Nickel Sulphate Hexahydrate - -       14 Mixed Dialkyl Thiourea - -       15 Paraben Mix [B] - -       16 Methyldibromo Glutaronitrile - -       17 Fragrance Mix 8% - -       18 2-Bromo-2-Nitropropane-1,3-Diol (Bronopol) CT - -       19 Lyral - -       20 Tixocortol-21- Pivalate CT - -       21 Diazolidinyl urea (Germall II) - -        22 Methyl Methacrylate - -       23 Cobalt (II) Chloride Hexahydrate - -       24 Fragrance Mix II  - -       25 Compositae Mix - -       26 Benzoyl Peroxide - -       27 Bacitracin - -       28 Formaldehyde - -       29 Methylchloroisothiazolinone / Methylisothiazolinone - -       30 Corticosteroid Mix CT - -       31 Sodium Lauryl Sulfate - -       32 Lanolin Alcohol - -       33 Turpentine - -       34 Cetylstearylalcohol - -       35 Chlorhexidine Dicluconate - -       36 Budesonide - -       37 Imidazolidinyl Urea  - -       38 Ethyl-2 Cyanoacrylate - -       39 Quaternium 15 (Dowicil 200) - -       40 Decyl Glucoside - -      PRESERVATIVES & ANTIMICROBIALS        # Substance 2 days 4 days remarks   41 1  1,2-Benzisothiazoline-3-One, Sodium Salt - -     2  1,3,5-Corby (2-Hydroxyethyl) - Hexahydrotriazine (Grotan BK) - -     3 3-Ucvdcrbsqeebc-2-Nitro-1, 3-Propanediol NA NA     4  3, 4, 4' - Triclocarban - -    45 5 4 - Chloro - 3 - Cresol - -     6 4 - Chloro - 4 - Xylenol (PCMX) - -     7 7-Ethylbicyclooxazolidine (Bioban [id number]) - -     8 Benzalkonium Chloride CT - -     9 Benzyl Alcohol - -    50 10 Cetalkonium Chloride - -     11 Cetylpyrimidine Chloride  - -     12 Chloroacetamide - -      13 DMDM Hydantoin - -     14 Glutaraldehyde - -    55 15 Triclosan - -     16 Glyoxal Trimeric Dihydrate - -     17 Iodopropynyl Butylcarbamate - -     18 Octylisothiazoline - -     19 Bithionol CT NA NA    60 20 Bioban P 1487 (Nitrobutyl) Morpholine/(Ethylnitro-Trimethylene) Dimorpholine - -     21 Phenoxyethanol - -     22 Phenyl Salicylate - -     23 Povidone Iodine - -     24 Sodium Benzoate - -    65 25 Sodium Disulfite - -     26 Sorbic Acid - -     27 Thimerosal - -     28 Melamine Formaldehyde Resin - -     29 Ethylenediamine Dihydrochloride - -      Parabens      70 30 Butyl-P-Hydroxybenzoate - -     31 Ethyl-P-Hydroxybenzoate - -     32 Methyl-P-Hydroxybenzoate - -    73 33 Propyl-P-Hydroxybenzoate - -     EMULSIFIERS & ADDITIVES       # Substance 2 days 4 days remarks   74 1 Polyethylene Glycol-400 - -    75 2 Cocamidopropyl Betaine - -     3 Amerchol L101 - -     4 Propylene Glycol - -     5 Triethanolamine - -     6 Sorbitane Sesquiolate CT - -    80 7 Isopropylmyristate - -     8 Polysorbate 80 CT - -     9 Amidoamine   (Stearamidopropyl Dimethylamine) - -     10 Oleamidopropyl Dimethylamine - -     11 Lauryl Glucoside - -    85 12 Coconut Diethanolamide  - -     13 2-Hydroxy-4-Methoxy Benzophenone (Oxybenzone) - -     14 Benzophenone-4 (Sulisobenzon) NA NA     15 Propolis - -     16 Dexpanthenol - -    90 17 Abitol - -     18 Tert-Butylhydroquinone - -     19 Benzyl Salicylate - -     20 Dimethylaminopropylamin (DMPA) - -     21 Zinc Pyrithione (Zinc Omadine)  - -    95 22 [place](Hydroxymethyl) Nitromethane  - -      Antioxidant       23 Dodecyl Gallate - -     24 Butylhydroxyanisole (BHA) - -     25 Butylhydroxytoluene (BHT) - -     26 Di-Alpha-Tocopherol (Vit E) - -    100 27 Propyl Gallate - -     PERFUMES, FLAVORS & PLANTS        # Substance 2 days 4 days remarks   101 1 Benzyl Cinnamate - -     2 Di-Limonene (Dipentene) - -     3 Cananga Odorata (Ylang Ylang) (I) - -     4 Lichen Acid Mix - -     105 5 Mentha Piperita Oil (Peppermint Oil) - -     6 Sesquiterpenelactone mix - -     7 Tea Tree Oil, Oxidized - -     8 Wood Tar Mix - -     9 Abietic Acid - -    110 10 Lavendula Angustifolia Oil (Lavender Oil) - -     11 Fragrance mix II CT * 14% - -      Fragrance Mix I       12 [place] Absolute - -     13 Eugenol - -     14 Geraniol - -    115 15 Hydroxycitronellal - -     16 Isoeugenol - -     17 Cinnamic Aldehyde - -     18 Cinnamic Alcohol  - -      Fragrance mix II       19 Citronellol - -    120 20 Alpha-Hexylcinnamic Aldehyde    - -     21 Citral - -     22 Farnesol - -    123 23 Coumarin - -    Hexylcinnamic aldehyde, Coumarin, Farnesol, Hydroxyisohexy3-cyclohexene carboxaldehyde, citral, citrolellol   ANTIBIOTICS & ANTIMYCOTICS    # Substance 2 days 4 days remarks   124 1 Erythromycin - -    125 2 Framycetin Sulfate - -     3 Fusidic Acid Sodium Salt - -     4 Gentamicin Sulfate - -     5 Neomycin Sulfate - -     6 Oxytetracycline  - -    130 7 Polymyxin B Sulfate - -     8 Tetracycline-HCL - -     9 Sulfanilamide - -     10 Metronidazole - -     11 Nitrofurazone - -    135 12 Nystatin - -     13 Clotrimazole - -     14 Clioquinol 5% - -     15 Miconazole  - -    139 16 Tobramycine        Could not find Protopic and Desonide    Results of patch tests:                         Interpretation:  - Negative                    A    = Allergic      (+) Erythema    TI   = Toxic/irritant   + E + Infiltration    RaP = Relevance at Present     ++ E/I + Papulovesicle   Rpr  = Relevance Previously     +++ E/I/P + Blister     nR   = No Relevance      [] No relevant allergic reaction observed    [] Allergic reaction diagnosed against following allergens:      Interpretation/ remarks:   See later    [] Patient information given   [] ACDS CAMP information's (# ....) to following compounds: .....   [] General information's to following compounds: ......      Assessment & Plan:    ==> Final Diagnosis:     # recurrent  subacute dermatitis face and periorbital  DDx allergic contact dermatitis and/or   Atopic dermatitis  * chronic illness with exacerbation, progression, side effects from treatment    # atopic predisposition with  Wheezing at night and stuffy nose morning =DDx GERD or cat?  Aggravation with RS and PND early spring and late fall = immediate sensitization to Alternaria alt  Since childhood flexural dermatitis  Clinically not relevant sensitization to grass pollens  * chronic illness with exacerbation, progression, side effects from treatment      These conclusions are made at the best of one's knowledge and belief based on the provided evidence such as patient's history and allergy test results and they can change over time or can be incomplete because of missing information's.    ==> Treatment Plan:  >> symbicort inhaler 160/4.5 2xdaily and Albuterol in reserve  >> pft  >> plan patch tests for periorbital dermatitis  >> maybe sent patient later to GI evaluation for GERD (Famotidine not yet effective, but needs more time, PPIs not possible for kidney problem)     Procedures Performed: Allergy tests, including patch tests     Staff: : provider    Follow-up in Derm-Allergy clinic for 1st readings of patch tests after 2 days and 2nd readings and final conclusions after 4 days   ___________________________    I spent a total of 20 minutes with Quan Davidson during today s  visit. This time was spent discussing all the individual test results, correlating them to the clinical relevance, counseling the patient and/or coordinating care and performing allergy tests.

## 2024-01-15 ENCOUNTER — OFFICE VISIT (OUTPATIENT)
Dept: ALLERGY | Facility: CLINIC | Age: 19
End: 2024-01-15
Payer: COMMERCIAL

## 2024-01-15 DIAGNOSIS — Z91.048 ALLERGY TO MOLD: ICD-10-CM

## 2024-01-15 DIAGNOSIS — J45.21 MILD INTERMITTENT ASTHMA WITH ACUTE EXACERBATION: ICD-10-CM

## 2024-01-15 DIAGNOSIS — J32.9 CHRONIC RHINOSINUSITIS: ICD-10-CM

## 2024-01-15 DIAGNOSIS — Z88.9 ATOPY: ICD-10-CM

## 2024-01-15 DIAGNOSIS — L23.5 ALLERGIC DERMATITIS DUE TO OTHER CHEMICAL PRODUCT: ICD-10-CM

## 2024-01-15 DIAGNOSIS — H01.119 CONTACT DERMATITIS OF EYELID, UNSPECIFIED LATERALITY: Primary | ICD-10-CM

## 2024-01-15 PROCEDURE — 95044 PATCH/APPLICATION TESTS: CPT | Mod: 59 | Performed by: DERMATOLOGY

## 2024-01-15 NOTE — LETTER
1/15/2024         RE: Quan Davidson  [address]  [place] [address]        Dear Colleague,    Thank you for referring your patient, Quan Davidson, to the [facility] [place]. Please see a copy of my visit note below.    [facility] Dermato-allergology Note  Office visit  Encounter Date: Taco 15, 2024  ____________________________________________    CC: No chief complaint on file.      HPI:  (Taco 15, 2024)  Mr. Quan Davidson is a(n) 18 year old male who presents today as a return patient for allergy tests as planned  - Follow-up in Derm-Allergy clinic for patch tests as planned and evaluation if any delayed reaction to IDT  - otherwise feeling well in usual state of health    Physical exam:  General: In no acute distress, well-developed, well-nourished  Eyes: no conjunctivitis  ENT: no signs of rhinitis   Pulmonary: no wheezing or coughing  Skin: Focused examination of the skin on test sites was performed = see test results below  No active eczematous skin lesions on tests sites, particularly back    Earlier History and Allergy exams:  (Taco 10, 2024)  - sent by PCP for recurrent dermatitis in face and periorbital and flexural areas of left arm and now still hyperpigmemtations periorbital and on arms.   - Started about 8 months ago and since 4 months much less problems.  - uses several times daily Hydrocortisone cream and if he doesn't use it the rash comes back  - only used Protopic one time, because it was burning a lot    Skin: in a photo from October 2nd 2023 desquamation and lichenification around the left eye with some papules --> subacute eczema, not really Rosacea    Past Medical History:   Patient Active Problem List   Diagnosis     Elevated blood pressure     Pre-diabetes     Class 3 severe obesity with serious comorbidity and body mass index (BMI) of 50.0 to 59.9 in adult (H)     Past Medical History:   Diagnosis Date      Glaucoma (increased eye pressure)      Term birth of male       Traumatic brain injury (H) 2014     UPJ (ureteropelvic junction) obstruction 03/10/2015     UPJ obstruction, congenital 2014    identified on imaging for unrelated trauma       Allergies:  Allergies   Allergen Reactions     Ibuprofen        Medications:  Current Outpatient Medications   Medication     albuterol (PROAIR HFA/PROVENTIL HFA/VENTOLIN HFA) 108 (90 Base) MCG/ACT inhaler     budesonide-formoterol (SYMBICORT) 160-4.5 MCG/ACT Inhaler     cyclobenzaprine (FLEXERIL) 10 MG tablet     insulin pen needle (31G X 5 MM) 31G X 5 MM miscellaneous     liraglutide - Weight Management (SAXENDA) 18 MG/3ML pen     naltrexone (DEPADE/REVIA) 50 MG tablet     No current facility-administered medications for this visit.       Social History:  The patient works in the moment not (just got high school diploma). Patient has the following hobbies or non-occupational exposure: music (oshea)    Family History:  Family History   Problem Relation Age of Onset     Diabetes Father      Hypertension Father    Younger brother has generalized eczema    Previous Labs, Allergy Tests, Dermatopathology, Imaging:  Consult by Dr. Jhaveri 2023    Assessment & Plan:   # Eyelid dermatitis, suspect ACD  # Mild flexural eczema, keratosis pilaris, and rhinitis likely atopic predisposition   Physical exam as below likely consistent with a allergic contact dermatitis given eyelid involvement.  Likely has a background  of atopic dermatitis given other concurrent findings and symptoms.  Etiology natural history of this condition was discussed with the patient as well as treatment options and the rationale for use.  We will start topical therapies including desonide ointment twice daily for the next 2 weeks.  Side effects of steroids discussed including risk of glaucoma and cataracts with prolonged use.  We will then transition to topical tacrolimus thereafter with repeat  use of desonide as needed.  Additionally allergy referral was placed for further evaluation and work-up.   - desonide (DESOWEN) 0.05 % external ointment; Apply topically 2 times daily  Dispense: 60 g; Refill: 1  - tacrolimus (PROTOPIC) 0.1 % external ointment; Apply topically 2 times daily  Dispense: 60 g; Refill: 1  - stop doxycycline, stop metronidazole  - Adult Dermatology Referral; Future    Referred By: Lashawn Jhaveri MD  [address] [place] [address]     Allergy Tests:    Past Allergy Test    Order for Future Allergy Testing:    [] Outpatient  [] Inpatient: Law..../ Bed ....       Skin Atopy (atopic dermatitis) [x] Yes   [] No since childhood flexural dermatitis  Contact allergies:   [] Yes   [x] No ..........  Hand eczema:   [] Yes   [x] No           Leading hand:   [] R   [] L       [] Ambidextrous         Drug allergies:        [] Yes   [x] No  which?..for kidney reasons no Ibuprofen.    Urticaria/Angioedema  [x] Yes   [] No 5 years ago hives  Food Allergy:  [] Yes   [x] No  which?......  Pets :  [x] Yes   [] No  which?.cats and dogs at home        [x]  Rhinitis   [] Conjunctivitis   [x] Sinusitis   [] Polyposis   [] Otitis   [] Pharyngitis         [x]  Postnasal drip    []  None for many years wakes up with stuffy and Rhinosinusitis/PND in early spring and late fall  Operations:   [] Tonsils   [] Septum   [] Sinus   [] Polyposis        [x] Asthma bronchiale   [x] Coughing      []  None uses inhaler for wheezing at night (worse since 3 months ==> uses 2xdaily Albuterol 10pm and morning)  Symptoms (mostly Rhinoconjunctivitis and Asthma) aggravated by:  Season   [] I   [] II   [x] III   [x] IV   []V   []VI   []VII   []VIII   []IX   [x]X   [x]XI   []XII     [x] perennial   Day time      [x] morning   [] noon      [] evening        [] night    [] whole day........  []  none  Location/changes    [] inside        [] outside   [] mountains    [] sea     [] others.............   []   none  Triggers, specific     [] animals     [] plants     [] dust              [] others ...........................    []  none  Triggers, others       [] work          [] psyche    [] sport            [] others .............................  []  none  Irritant                [] phys efforts [] smoke    [] heat/cold     [] odors  []others............... []  none    Order for PATCH TESTS  Reason for tests (suspected allergy): periorbital and facial eczema  Known previous allergies: none  Standardized panels  [x] Standard panel (40 tests)  [x] Preservatives & Antimicrobials (31 tests)  [x] Emulsifiers & Additives (25 tests)   [x] Perfumes/Flavours & Plants (25 tests)  [] Hairdresser panel (12 tests)  [] Rubber Chemicals (22 tests)  [] Plastics (26 tests)  [] Colorants/Dyes/Food additives (20 tests)  [] Metals (implants/dental) (24 tests)  [] Local anaesthetics/NSAIDs (13 tests)  [x] Antibiotics & Antimycotics (14 tests)   [] Corticosteroids (15 tests)   [] Photopatch test (62 tests)   [] others: ...      [x] Patient's own products: .Protopic and Desonide  DO NOT test if chemical or biological identity is unknown!   always ask from patient the product information and safety sheets (MSDS)       Order for PRICK TESTS    Reason for tests (suspected allergy): atopy?  Known previous allergies: n/a    Standardized prick panels  [x] Atopic panel (20 tests)  [] Pediatric Panel (12 tests)  [] Milk, Meat, Eggs, Grains (20 tests)   [] Dust, Epithelia, Feathers (10 tests)  [] Fish, Seafood, Shellfish (17 tests)  [] Nuts, Beans (14 tests)  [] Spice, Vegetable, Fruit (17 tests)  [] Pollen Panel = Tree, Grass, Weed (24 tests)  [] Others: ...      [] Patient's own products: ...  DO NOT test if chemical or biological identity is unknown!   always ask from patient the product information and safety sheets (MSDS)     Standardized intradermal tests  [] Penicillium notatum [] Aspergillus fumigatus [] House dust mites D.far & D. pteron  []  Cat    [] dog  [] Others: ...  [] Bee venom   [] Wasp venom  !!Specific protocol with dilutions!!       Order for Drug allergy tests (prick & Intradermal &  patch tests)    [] Penicillin G  [] Ampicillin [] Cefazolin   [] Ceftriaxone   [] Ceftazidime  [] Bactrim    [] Others: ...  Order for ... as test date    Atopy Screen (Placed Taco 10, 2024)  No Substance Readings (15 min) Evaluation   POS Histamine 1mg/ml ++    NEG NaCl 0.9% -      No Substance Readings (15 min) Evaluation   1 Alternaria alternata (tenuis)  -    2 Cladosporium herbarum -    3 Aspergillus fumigatus -    4 Penicillium notatum -    5 Dermatophagoides pteronyssinus -    6 Dermatophagoides farinae -    7 Dog epithelium (canis spp) -    8 Cat hair (debbi catus) -    9 Cockroach   (Blatella americana & germanica) -    10 Grass mix midwest   (Nayely, Orchard, Red[place], Denis) ++    11 Kurt grass (sorghum halepense) -    12 Weed mix   (common Cocklebur, Lamb s quarters, rough redroot Pigweed, Dock/Sorrel) -    13 Mug wort (artemisia vulgare) -    14 Ragweed giant/short (ambrosia spp) -    15 White birch (Betula papyrifera) -    16 Tree mix 1 (Pecan, Maple BHR, Oak RVW, american [place], black [place]) -    17 Red cedar (juniperus virginia) -    18 Tree mix 2   (white Zain, river/red Birch, black [place], common [place], american Elm) -    19 Box elder/Maple mix (acer spp) -    20 [place] shagbark (carya ovata) -           Conclusion       Intradermal Testing (Placed Taco 10, 2024)  No Substance Conc.  Reading (15min)  immediate Papule [mm] / Erythema [mm] Reading   (5 days)  delayed Papule [mm] / Erythema [mm] Remarks   DF Standard Dust Mite - D. Farinae 1:10 -  + 5/5    DP Standard Dust Mite - D. Pteronyssinus 1:10 -  (+) 2/2    A Aspergillus fumigatus  1:10 -   -    P Penicillium notatum 1:10 -   -     Alternaria alt 1:10 +/++ 5/8  -     Dog epithelium 1:10 (+) 4/4  -     Cat epithelium 1:10 + 5/6      Conclusions:      ________________________________  RESULTS & EVALUATION of PATCH TESTS    Taco 15, 2024 application of patch tests:    Patch test readings after     [x] 2 days, [] 3 days [x] 4 days, [] 5 days,  Other duration: ...    STANDARD Series                                          # Substance 2 days 4 days remarks     1 Venkatesh Mix [C] - -       2 Colophony - -       3  2-Mercaptobenzothiazole  - -       4 Methylisothiazolinone - -       5 Carba Mix - -       6 Thiuram Mix [A] - -       7 Bisphenol A Epoxy Resin - -       8 P-Apiv-Vzxhuenxxel-Formaldehyde Resin - -       9 Mercapto Mix [A] - -       10 Black Rubber Mix- PPD [B] - -       11 Potassium Dichromate  -  -       12 Balsam of [place] (Myroxylon Pereirae Resin) - -       13 Nickel Sulphate Hexahydrate - -       14 Mixed Dialkyl Thiourea - -       15 Paraben Mix [B] - -       16 Methyldibromo Glutaronitrile - -       17 Fragrance Mix 8% - -       18 2-Bromo-2-Nitropropane-1,3-Diol (Bronopol) CT - -       19 Lyral - -       20 Tixocortol-21- Pivalate CT - -       21 Diazolidinyl urea (Germall II) - -        22 Methyl Methacrylate - -       23 Cobalt (II) Chloride Hexahydrate - -       24 Fragrance Mix II  - -       25 Compositae Mix - -       26 Benzoyl Peroxide - -       27 Bacitracin - -       28 Formaldehyde - -       29 Methylchloroisothiazolinone / Methylisothiazolinone - -       30 Corticosteroid Mix CT - -       31 Sodium Lauryl Sulfate - -       32 Lanolin Alcohol - -       33 Turpentine - -       34 Cetylstearylalcohol - -       35 Chlorhexidine Dicluconate - -       36 Budesonide - -       37 Imidazolidinyl Urea  - -       38 Ethyl-2 Cyanoacrylate - -       39 Quaternium 15 (Dowicil 200) - -       40 Decyl Glucoside - -      PRESERVATIVES & ANTIMICROBIALS        # Substance 2 days 4 days remarks   41 1  1,2-Benzisothiazoline-3-One, Sodium Salt - -     2  1,3,5-Corby (2-Hydroxyethyl) - Hexahydrotriazine (Grotan BK) - -     3 8-Mrzopzuwwjagt-6-Nitro-1,  3-Propanediol NA NA     4  3, 4, 4' - Triclocarban - -    45 5 4 - Chloro - 3 - Cresol - -     6 4 - Chloro - 4 - Xylenol (PCMX) - -     7 7-Ethylbicyclooxazolidine (Bioban [id number]) - -     8 Benzalkonium Chloride CT - -     9 Benzyl Alcohol - -    50 10 Cetalkonium Chloride - -     11 Cetylpyrimidine Chloride  - -     12 Chloroacetamide - -     13 DMDM Hydantoin - -     14 Glutaraldehyde - -    55 15 Triclosan - -     16 Glyoxal Trimeric Dihydrate - -     17 Iodopropynyl Butylcarbamate - -     18 Octylisothiazoline - -     19 Bithionol CT NA NA    60 20 Bioban P 1487 (Nitrobutyl) Morpholine/(Ethylnitro-Trimethylene) Dimorpholine - -     21 Phenoxyethanol - -     22 Phenyl Salicylate - -     23 Povidone Iodine - -     24 Sodium Benzoate - -    65 25 Sodium Disulfite - -     26 Sorbic Acid - -     27 Thimerosal - -     28 Melamine Formaldehyde Resin - -     29 Ethylenediamine Dihydrochloride - -      Parabens      70 30 Butyl-P-Hydroxybenzoate - -     31 Ethyl-P-Hydroxybenzoate - -     32 Methyl-P-Hydroxybenzoate - -    73 33 Propyl-P-Hydroxybenzoate - -     EMULSIFIERS & ADDITIVES       # Substance 2 days 4 days remarks   74 1 Polyethylene Glycol-400 - -    75 2 Cocamidopropyl Betaine - -     3 Amerchol L101 - -     4 Propylene Glycol - -     5 Triethanolamine - -     6 Sorbitane Sesquiolate CT - -    80 7 Isopropylmyristate - -     8 Polysorbate 80 CT - -     9 Amidoamine   (Stearamidopropyl Dimethylamine) - -     10 Oleamidopropyl Dimethylamine - -     11 Lauryl Glucoside - -    85 12 Coconut Diethanolamide  - -     13 2-Hydroxy-4-Methoxy Benzophenone (Oxybenzone) - -     14 Benzophenone-4 (Sulisobenzon) NA NA     15 Propolis - -     16 Dexpanthenol - -    90 17 Abitol - -     18 Tert-Butylhydroquinone - -     19 Benzyl Salicylate - -     20 Dimethylaminopropylamin (DMPA) - -     21 Zinc Pyrithione (Zinc Omadine)  - -    95 22 Corby(Hydroxymethyl) Nitromethane  - -      Antioxidant       23 Dodecyl Gallate - -      24 Butylhydroxyanisole (BHA) - -     25 Butylhydroxytoluene (BHT) - -     26 Di-Alpha-Tocopherol (Vit E) - -    100 27 Propyl Gallate - -     PERFUMES, FLAVORS & PLANTS        # Substance 2 days 4 days remarks   101 1 Benzyl Cinnamate - -     2 Di-Limonene (Dipentene) - -     3 Cananga Odorata (Luis Smith) (I) - -     4 Lichen Acid Mix - -    105 5 Mentha Piperita Oil (Peppermint Oil) - -     6 Sesquiterpenelactone mix - -     7 Tea Tree Oil, Oxidized - -     8 Wood Tar Mix - -     9 Abietic Acid - -    110 10 Lavendula Angustifolia Oil (Lavender Oil) - -     11 Fragrance mix II CT * 14% - -      Fragrance Mix I       12 [place] Absolute - -     13 Eugenol - -     14 Geraniol - -    115 15 Hydroxycitronellal - -     16 Isoeugenol - -     17 Cinnamic Aldehyde - -     18 Cinnamic Alcohol  - -      Fragrance mix II       19 Citronellol - -    120 20 Alpha-Hexylcinnamic Aldehyde    - -     21 Citral - -     22 Farnesol - -    123 23 Coumarin - -    Hexylcinnamic aldehyde, Coumarin, Farnesol, Hydroxyisohexy3-cyclohexene carboxaldehyde, citral, citrolellol   ANTIBIOTICS & ANTIMYCOTICS    # Substance 2 days 4 days remarks   124 1 Erythromycin - -    125 2 Framycetin Sulfate - -     3 Fusidic Acid Sodium Salt - -     4 Gentamicin Sulfate - -     5 Neomycin Sulfate - -     6 Oxytetracycline  - -    130 7 Polymyxin B Sulfate - -     8 Tetracycline-HCL - -     9 Sulfanilamide - -     10 Metronidazole - -     11 Nitrofurazone - -    135 12 Nystatin - -     13 Clotrimazole - -     14 Clioquinol 5% - -     15 Miconazole  - -    139 16 Tobramycine        Could not find Protopic and Desonide    Results of patch tests:                         Interpretation:  - Negative                    A    = Allergic      (+) Erythema    TI   = Toxic/irritant   + E + Infiltration    RaP = Relevance at Present     ++ E/I + Papulovesicle   Rpr  = Relevance Previously     +++ E/I/P + Blister     nR   = No Relevance      [] No relevant allergic  reaction observed    [] Allergic reaction diagnosed against following allergens:      Interpretation/ remarks:   See later    [] Patient information given   [] ACDS CAMP information's (# ....) to following compounds: .....   [] General information's to following compounds: ......      Assessment & Plan:    ==> Final Diagnosis:     # recurrent subacute dermatitis face and periorbital  DDx allergic contact dermatitis and/or   Atopic dermatitis  * chronic illness with exacerbation, progression, side effects from treatment    # atopic predisposition with  Wheezing at night and stuffy nose morning =DDx GERD or cat?  Aggravation with RS and PND early spring and late fall = immediate sensitization to Alternaria alt  Since childhood flexural dermatitis  Clinically not relevant sensitization to grass pollens  * chronic illness with exacerbation, progression, side effects from treatment      These conclusions are made at the best of one's knowledge and belief based on the provided evidence such as patient's history and allergy test results and they can change over time or can be incomplete because of missing information's.    ==> Treatment Plan:  >> symbicort inhaler 160/4.5 2xdaily and Albuterol in reserve  >> pft  >> plan patch tests for periorbital dermatitis  >> maybe sent patient later to GI evaluation for GERD (Famotidine not yet effective, but needs more time, PPIs not possible for kidney problem)     Procedures Performed: Allergy tests, including patch tests     Staff: : provider    Follow-up in Derm-Allergy clinic for 1st readings of patch tests after 2 days and 2nd readings and final conclusions after 4 days   ___________________________    I spent a total of 20 minutes with Quan Davidson during today s  visit. This time was spent discussing all the individual test results, correlating them to the clinical relevance, counseling the patient and/or coordinating care and performing allergy tests.            Again, thank you for allowing me to participate in the care of your patient.        Sincerely,        Bebeto Vinson MD

## 2024-01-15 NOTE — NURSING NOTE
Chief Complaint   Patient presents with    Allergy Testing Followup     Patch testing day 1     Afua Nielsen RN

## 2024-01-17 ENCOUNTER — ALLIED HEALTH/NURSE VISIT (OUTPATIENT)
Dept: ALLERGY | Facility: CLINIC | Age: 19
End: 2024-01-17
Payer: COMMERCIAL

## 2024-01-17 DIAGNOSIS — H01.119 CONTACT DERMATITIS OF EYELID, UNSPECIFIED LATERALITY: Primary | ICD-10-CM

## 2024-01-17 PROCEDURE — 99207 PR NO CHARGE NURSE ONLY: CPT

## 2024-01-17 PROCEDURE — 99207 PR NO CHARGE LOS: CPT

## 2024-01-17 NOTE — NURSING NOTE
Chief Complaint   Patient presents with    Allergy Testing Followup     Patch testing day 3. Patches removed, photos taken, initial reading completed by this RN, second blind reading completed by Afua AVILES, initial results include: 12(?), 16(?), 17(?), and 108(?).      Ray Morgan RN

## 2024-01-17 NOTE — PROGRESS NOTES
Quan FLORA Davidson comes into clinic today at the request of Dr. Vinson Ordering Provider for patch testing.    This service provided today was under the supervising provider of the day Dr. Wright, who was available if needed.    Ray Morgan RN

## 2024-01-17 NOTE — NURSING NOTE
Chief Complaint   Patient presents with    Allergy Testing Followup     Patch testing day 3     Afua Nielsen RN

## 2024-01-18 ENCOUNTER — PATIENT OUTREACH (OUTPATIENT)
Dept: CARE COORDINATION | Facility: CLINIC | Age: 19
End: 2024-01-18
Payer: COMMERCIAL

## 2024-01-19 ENCOUNTER — OFFICE VISIT (OUTPATIENT)
Dept: ALLERGY | Facility: CLINIC | Age: 19
End: 2024-01-19
Payer: COMMERCIAL

## 2024-01-19 DIAGNOSIS — L20.89 OTHER ATOPIC DERMATITIS: Primary | ICD-10-CM

## 2024-01-19 DIAGNOSIS — H01.119 CONTACT DERMATITIS OF EYELID, UNSPECIFIED LATERALITY: ICD-10-CM

## 2024-01-19 DIAGNOSIS — J32.9 CHRONIC RHINOSINUSITIS: ICD-10-CM

## 2024-01-19 DIAGNOSIS — Z91.048 ALLERGY TO MOLD: ICD-10-CM

## 2024-01-19 DIAGNOSIS — J45.21 MILD INTERMITTENT ASTHMA WITH ACUTE EXACERBATION: ICD-10-CM

## 2024-01-19 DIAGNOSIS — Z91.09 HOUSE DUST MITE ALLERGY: ICD-10-CM

## 2024-01-19 PROCEDURE — 99214 OFFICE O/P EST MOD 30 MIN: CPT | Performed by: DERMATOLOGY

## 2024-01-19 RX ORDER — PIMECROLIMUS 10 MG/G
CREAM TOPICAL 2 TIMES DAILY
Qty: 30 G | Refills: 3 | Status: SHIPPED | OUTPATIENT
Start: 2024-01-19 | End: 2024-03-12

## 2024-01-19 NOTE — PROGRESS NOTES
"Clinic Care Coordination Contact  Follow Up Progress Note      Assessment: CCC RN spoke with patient today to follow up on goal and to discuss any needs or concerns. Patient stated he was in the ED on 1/6/24 at experiencing severe right shoulder pain when he was reaching up. He reported he waited in the ED for 4 hours without being seen and decided to leave. Patient was scheduled with his PCP to discuss shoulder pain concerns on 2/5/24. He said he has been working with physical therapist and he feels the pain is \"getting better\".   Patient stated he is making progress towards this weight loss goal. She stated her current weight is 313 lbs. He stated he has follow up with the weight loss clinic on 2/20/24 and plans to attend this appointment. No other needs or concerns.       Care Gaps:    Health Maintenance Due   Topic Date Due    ASTHMA ACTION PLAN  Never done    ASTHMA CONTROL TEST  Never done    HEPATITIS B IMMUNIZATION (1 of 3 - 3-dose series) Never done    COVID-19 Vaccine (1) Never done    DTAP/TDAP/TD IMMUNIZATION (2 - Td or Tdap) 10/24/2023    HPV IMMUNIZATION (2 - Male 3-dose series) 10/24/2023       Scheduled with PCP on 2/5/24.       Care Plans  Care Plan: General       Problem: HP GENERAL PROBLEM       Long-Range Goal: I would like to work with the Bariatric Clinic to assist me with weight loss.       Start Date: 6/28/2023 Expected End Date: 2/28/2024    This Visit's Progress: 50% Recent Progress: 40%    Priority: High    Note:     Barriers: elevated BMI  Strengths: strong advocate for himself.  Patient expressed understanding of goal: yes  Action steps to achieve this goal:  1. I understand Dr. Louis has sent  referral to the Bariatric Clinic on my behalf.   2. I understand a representative from the Bariatric Clinic will contact me directly to scheduled an intake appointment.   3. I will report progress towards this goal at scheduled outreach telephone calls from my Care Coordination team.      Discussed " on 1/18/24 11/22 discussed, next PCP appt 11/28, next weight mgmt visit 1/12 and 1/16/24  Discussed - completed virtual visit  Discussed on 8/2/23                              Intervention/Education provided during outreach: Discussed the importance of taking his medications as directed. Encouraged him to attend all upcoming appointments. Encouraged him to contact Care Coordination for any additional needs or concerns.      Outreach Frequency: monthly, more frequently as needed        Plan: CCC RN will continue to monitor, support patient with current goal and will be available to assist as nursing needs arise. CCC CHW will continue to reach out to patient on a monthly basis to discuss progression of her goal.     Care Coordinator will perform Chart Review in 45 days.

## 2024-01-19 NOTE — PATIENT INSTRUCTIONS
Patient information given                       [x] ACDS CAMP information's (# HMACKZWS, and [id number]) to following compounds: Lanolin                           [x] General information's to following compounds: HOUSE DUST MITES info       ==> Treatment Plan:     >> follow the recommendations of the CAMP Adele and avoid all products on skin (incl hair) containing Lanolin     >> try in face Elidel cream 2xdaily (Protopic was burning too much) --> included MTM team to check if no Lanolin in there and to help with PA (alternatives Eucrisa or Opzelura)     >> try to reduce exposure to dust mites (info given)     >> symbicort inhaler 160/4.5 2xdaily and Albuterol in reserve (will have in Februarly the lung function test)  >> maybe sent patient later to GI evaluation for GERD (Famotidine not yet effective, but needs more time, PPIs not possible for kidney problem)  ___________________________      House Dust Mite Allergy        The house dust mite is an arachnid about 0.3 mm in size and not visible to the naked eye. There are around 150 species of house dust mites in the world. One mite produces up to 40 fecal droppings a day. One teaspoonful of bedroom dust contains an average of nearly 1000 mites and 250,000 minute droppings.    Causes and triggers of house dust mite allergy  The house dust mite requires a warm, moist environment without light in order to live and reproduce. Our beds are ideal. The mite feeds on human and animal skin scales. The allergen is mainly contained in the mite's feces. The feces contain allergy-triggering constituents which are spread in fine dust, are breathed in and can cause an allergic reaction.    Symptoms  When the allergens come into contact with the mucous membranes in the eyes, nose, mouth and throat, sufferers develop symptoms typical of an allergic cold (allergic rhinitis) or an allergic inflammation of the conjunctiva (allergic conjunctivitis): blocked or runny nose, sneezing, red,  itchy eyes. If all of these symptoms are present, then the condition is also known as rhinoconjunctivitis. Often, the upper respiratory tract becomes chronically inflamed, primarily because house dust mites are present all year round.  The symptoms of house dust mite allergy typically occur in the morning and are more frequent in the cold months of the year.    Therapy and treatment  As a first step, mattress, pillows and duvet/comforter should be placed in mite-proof or anti-mite covers, sometimes known as encasings. Alternatively you can use pillows or comforter that can be washed at over 130 F monthly. At the same time, house dust should be minimized. If necessary, the symptoms can be treated with medication, for example antihistamines in the form of nasal sprays, eye drops and tablets. Desensitization/specific immunotherapy (SIT) is recommended for house dust allergy if all the measures above are not sufficient.    Tips and tricks:  Keep room temperature at 66-70 F and relative air humidity at a maximum of 50%.  Ideally, thoroughly air your home two to three times a day for 5 to 10 minutes each time.  Wash bed linens in at least 130 F every week.  Remove stuffed animals or freeze them every other week.  Keep ceiling fans off in the bedroom as they can stir up dust mite allergens.  Remove dust from furniture with a damp cloth and regularly wet mop floors.  Do not put pot and hydroponic plants in the bedroom and also avoid putting too many in living areas, as they increase room humidity.  When staying overnight in other accommodations, we recommend taking your own bed linen and the above anti-mite mattress covers with you.  Remove upholstered furniture from the bedroom and consider removing the carpets. Ideally, use sealed parquet or laminate manuel, cork tiles or manuel made of wood, novilon or PVC.  Maybe additionally reduce dust mites in mattress, upholstery, or manuel using hot steam  ".      Modified from \"House Dust Mite Allergy\" by [employer]! [employer] [facility].   "

## 2024-01-19 NOTE — NURSING NOTE
Chief Complaint   Patient presents with    RECHECK     Patch day 5     La NASCIMENTO, RN-BSN  Dermatology Surgery  [phone]

## 2024-01-19 NOTE — PROGRESS NOTES
[facility] Dermato-allergology Note  Office visit  Encounter Date: 2024  ____________________________________________    CC: RECHECK (Patch day 5)      HPI:  (2024)  Mr. Quan Davidson is a(n) 18 year old male who presents today as a return patient for allergy consultation  - Follow-up in Derm-Allergy clinic for 12nd readings and final conclusions after 4 days   - otherwise feeling well in usual state of health    Physical exam:  General: In no acute distress, well-developed, well-nourished  Eyes: no conjunctivitis  ENT: no signs of rhinitis   Pulmonary: no wheezing or coughing  Skin:Focused examination of the skin on test sites was performed = see test results below    Earlier History and Allergy exams:  (Taco 15, 2024)  - Follow-up in Derm-Allergy clinic for patch tests as planned and evaluation if any delayed reaction to IDT    Earlier History and Allergy exams:  (Taco 10, 2024)  - sent by PCP for recurrent dermatitis in face and periorbital and flexural areas of left arm and now still hyperpigmemtations periorbital and on arms.   - Started about 8 months ago and since 4 months much less problems.  - uses several times daily Hydrocortisone cream and if he doesn't use it the rash comes back  - only used Protopic one time, because it was burning a lot    Skin: in a photo from 2023 desquamation and lichenification around the left eye with some papules --> subacute eczema, not really Rosacea    Past Medical History:   Patient Active Problem List   Diagnosis    Elevated blood pressure    Pre-diabetes    Class 3 severe obesity with serious comorbidity and body mass index (BMI) of 50.0 to 59.9 in adult (H)     Past Medical History:   Diagnosis Date    Glaucoma (increased eye pressure)     Term birth of male      Traumatic brain injury (H) 2014    UPJ (ureteropelvic junction) obstruction 03/10/2015    UPJ obstruction, congenital 2014    identified on  imaging for unrelated trauma       Allergies:  Allergies   Allergen Reactions    Ibuprofen        Medications:  Current Outpatient Medications   Medication    albuterol (PROAIR HFA/PROVENTIL HFA/VENTOLIN HFA) 108 (90 Base) MCG/ACT inhaler    budesonide-formoterol (SYMBICORT) 160-4.5 MCG/ACT Inhaler    cyclobenzaprine (FLEXERIL) 10 MG tablet    insulin pen needle (31G X 5 MM) 31G X 5 MM miscellaneous    liraglutide - Weight Management (SAXENDA) 18 MG/3ML pen    naltrexone (DEPADE/REVIA) 50 MG tablet     No current facility-administered medications for this visit.       Social History:  The patient works in the moment not (just got high school diploma). Patient has the following hobbies or non-occupational exposure: music (singer)    Family History:  Family History   Problem Relation Age of Onset    Diabetes Father     Hypertension Father    Younger brother has generalized eczema    Previous Labs, Allergy Tests, Dermatopathology, Imaging:  Consult by Dr. Jhaveri June 2023    Assessment & Plan:   # Eyelid dermatitis, suspect ACD  # Mild flexural eczema, keratosis pilaris, and rhinitis likely atopic predisposition   Physical exam as below likely consistent with a allergic contact dermatitis given eyelid involvement.  Likely has a background  of atopic dermatitis given other concurrent findings and symptoms.  Etiology natural history of this condition was discussed with the patient as well as treatment options and the rationale for use.  We will start topical therapies including desonide ointment twice daily for the next 2 weeks.  Side effects of steroids discussed including risk of glaucoma and cataracts with prolonged use.  We will then transition to topical tacrolimus thereafter with repeat use of desonide as needed.  Additionally allergy referral was placed for further evaluation and work-up.   - desonide (DESOWEN) 0.05 % external ointment; Apply topically 2 times daily  Dispense: 60 g; Refill: 1  - tacrolimus  (PROTOPIC) 0.1 % external ointment; Apply topically 2 times daily  Dispense: 60 g; Refill: 1  - stop doxycycline, stop metronidazole  - Adult Dermatology Referral; Future    Referred By: Lashawn Jhaveri MD  [address] [place] [address]     Allergy Tests:    Past Allergy Test    Order for Future Allergy Testing:    [] Outpatient  [] Inpatient: Law..../ Bed ....       Skin Atopy (atopic dermatitis) [x] Yes   [] No since childhood flexural dermatitis  Contact allergies:   [] Yes   [x] No ..........  Hand eczema:   [] Yes   [x] No           Leading hand:   [] R   [] L       [] Ambidextrous         Drug allergies:        [] Yes   [x] No  which?..for kidney reasons no Ibuprofen.    Urticaria/Angioedema  [x] Yes   [] No 5 years ago hives  Food Allergy:  [] Yes   [x] No  which?......  Pets :  [x] Yes   [] No  which?.cats and dogs at home        [x]  Rhinitis   [] Conjunctivitis   [x] Sinusitis   [] Polyposis   [] Otitis   [] Pharyngitis         [x]  Postnasal drip    []  None for many years wakes up with stuffy and Rhinosinusitis/PND in early spring and late fall  Operations:   [] Tonsils   [] Septum   [] Sinus   [] Polyposis        [x] Asthma bronchiale   [x] Coughing      []  None uses inhaler for wheezing at night (worse since 3 months ==> uses 2xdaily Albuterol 10pm and morning)  Symptoms (mostly Rhinoconjunctivitis and Asthma) aggravated by:  Season   [] I   [] II   [x] III   [x] IV   []V   []VI   []VII   []VIII   []IX   [x]X   [x]XI   []XII     [x] perennial   Day time      [x] morning   [] noon      [] evening        [] night    [] whole day........  []  none  Location/changes    [] inside        [] outside   [] mountains    [] sea     [] others.............   []  none  Triggers, specific     [] animals     [] plants     [] dust              [] others ...........................    []  none  Triggers, others       [] work          [] psyche    [] sport            [] others  .............................  []  none  Irritant                [] phys efforts [] smoke    [] heat/cold     [] odors  []others............... []  none    Order for PATCH TESTS  Reason for tests (suspected allergy): periorbital and facial eczema  Known previous allergies: none  Standardized panels  [x] Standard panel (40 tests)  [x] Preservatives & Antimicrobials (31 tests)  [x] Emulsifiers & Additives (25 tests)   [x] Perfumes/Flavours & Plants (25 tests)  [] Hairdresser panel (12 tests)  [] Rubber Chemicals (22 tests)  [] Plastics (26 tests)  [] Colorants/Dyes/Food additives (20 tests)  [] Metals (implants/dental) (24 tests)  [] Local anaesthetics/NSAIDs (13 tests)  [x] Antibiotics & Antimycotics (14 tests)   [] Corticosteroids (15 tests)   [] Photopatch test (62 tests)   [] others: ...      [x] Patient's own products: .Protopic and Desonide  DO NOT test if chemical or biological identity is unknown!   always ask from patient the product information and safety sheets (MSDS)       Order for PRICK TESTS    Reason for tests (suspected allergy): atopy?  Known previous allergies: n/a    Standardized prick panels  [x] Atopic panel (20 tests)  [] Pediatric Panel (12 tests)  [] Milk, Meat, Eggs, Grains (20 tests)   [] Dust, Epithelia, Feathers (10 tests)  [] Fish, Seafood, Shellfish (17 tests)  [] Nuts, Beans (14 tests)  [] Spice, Vegetable, Fruit (17 tests)  [] Pollen Panel = Tree, Grass, Weed (24 tests)  [] Others: ...      [] Patient's own products: ...  DO NOT test if chemical or biological identity is unknown!   always ask from patient the product information and safety sheets (MSDS)     Standardized intradermal tests  [] Penicillium notatum [] Aspergillus fumigatus [] House dust mites D.far & D. pteron  [] Cat    [] dog  [] Others: ...  [] Bee venom   [] Wasp venom  !!Specific protocol with dilutions!!       Order for Drug allergy tests (prick & Intradermal &  patch tests)    [] Penicillin G  [] Ampicillin []  Cefazolin   [] Ceftriaxone   [] Ceftazidime  [] Bactrim    [] Others: ...  Order for ... as test date    Atopy Screen (Placed Taco 10, 2024)  No Substance Readings (15 min) Evaluation   POS Histamine 1mg/ml ++    NEG NaCl 0.9% -      No Substance Readings (15 min) Evaluation   1 Alternaria alternata (tenuis)  -    2 Cladosporium herbarum -    3 Aspergillus fumigatus -    4 Penicillium notatum -    5 Dermatophagoides pteronyssinus -    6 Dermatophagoides farinae -    7 Dog epithelium (canis spp) -    8 Cat hair (debbi catus) -    9 Cockroach   (Blatella americana & germanica) -    10 Grass mix midwest   (Nayely, Orchard, Red[place], Denis) ++    11 Kurt grass (sorghum halepense) -    12 Weed mix   (common Cocklebur, Lamb s quarters, rough redroot Pigweed, Dock/Sorrel) -    13 Mug wort (artemisia vulgare) -    14 Ragweed giant/short (ambrosia spp) -    15 White birch (Betula papyrifera) -    16 Tree mix 1 (Pecan, Maple BHR, Oak RVW, american [place], black [place]) -    17 Red cedar (juniperus virginia) -    18 Tree mix 2   (white Zain, river/red Birch, black [place], common [place], american Elm) -    19 Box elder/Maple mix (acer spp) -    20 [place] shagbark (carya ovata) -           Conclusion       Intradermal Testing (Placed Taco 10, 2024)  No Substance Conc.  Reading (15min)  immediate Papule [mm] / Erythema [mm] Reading   (5 days)  delayed Papule [mm] / Erythema [mm] Remarks   DF Standard Dust Mite - D. Farinae 1:10 -  + 5/5    DP Standard Dust Mite - D. Pteronyssinus 1:10 -  (+) 2/2    A Aspergillus fumigatus  1:10 -   -    P Penicillium notatum 1:10 -   -     Alternaria alt 1:10 +/++ 5/8  -     Dog epithelium 1:10 (+) 4/4  -     Cat epithelium 1:10 + 5/6      Conclusions:     ________________________________  RESULTS & EVALUATION of PATCH TESTS    Taco 15, 2024 application of patch tests:    Patch test readings after     [x] 2 days, [] 3 days [x] 4 days, [] 5 days,  Other duration: ...    STANDARD Series                                           # Substance 2 days 4 days remarks     1 Venkatesh Mix [C] - -       2 Colophony - -       3  2-Mercaptobenzothiazole  - -       4 Methylisothiazolinone - -       5 Carba Mix - -       6 Thiuram Mix [A] - -       7 Bisphenol A Epoxy Resin - -       8 S-Elts-Lwlmzdooqdm-Formaldehyde Resin - -       9 Mercapto Mix [A] - -       10 Black Rubber Mix- PPD [B] - -       11 Potassium Dichromate  -  -       12 Balsam of [place] (Myroxylon Pereirae Resin) - -       13 Nickel Sulphate Hexahydrate - -       14 Mixed Dialkyl Thiourea - -       15 Paraben Mix [B] - -       16 Methyldibromo Glutaronitrile - -       17 Fragrance Mix 8% - -       18 2-Bromo-2-Nitropropane-1,3-Diol (Bronopol) CT - -       19 Lyral - -       20 Tixocortol-21- Pivalate CT - -       21 Diazolidinyl urea (Germall II) - -        22 Methyl Methacrylate - -       23 Cobalt (II) Chloride Hexahydrate - -       24 Fragrance Mix II  - -       25 Compositae Mix - -       26 Benzoyl Peroxide - -       27 Bacitracin - -       28 Formaldehyde - -       29 Methylchloroisothiazolinone / Methylisothiazolinone - -       30 Corticosteroid Mix CT - -       31 Sodium Lauryl Sulfate - -       32 Lanolin Alcohol - +       33 Turpentine - -       34 Cetylstearylalcohol - -       35 Chlorhexidine Dicluconate - -       36 Budesonide - -       37 Imidazolidinyl Urea  - -       38 Ethyl-2 Cyanoacrylate - -       39 Quaternium 15 (Dowicil 200) - -       40 Decyl Glucoside - -      PRESERVATIVES & ANTIMICROBIALS        # Substance 2 days 4 days remarks   41 1  1,2-Benzisothiazoline-3-One, Sodium Salt - -     2  1,3,5-Corby (2-Hydroxyethyl) - Hexahydrotriazine (Grotan BK) - -     3 4-Xddyjmcdczacv-3-Nitro-1, 3-Propanediol NA NA     4  3, 4, 4' - Triclocarban - -    45 5 4 - Chloro - 3 - Cresol - -     6 4 - Chloro - 4 - Xylenol (PCMX) - -     7 7-Ethylbicyclooxazolidine (Bioban [id number]) - -     8 Benzalkonium Chloride CT - -     9 Benzyl Alcohol - -    50 10  Cetalkonium Chloride - -     11 Cetylpyrimidine Chloride  - -     12 Chloroacetamide - -     13 DMDM Hydantoin - -     14 Glutaraldehyde - -    55 15 Triclosan - -     16 Glyoxal Trimeric Dihydrate - -     17 Iodopropynyl Butylcarbamate - -     18 Octylisothiazoline - -     19 Bithionol CT NA NA    60 20 Bioban P 1487 (Nitrobutyl) Morpholine/(Ethylnitro-Trimethylene) Dimorpholine - -     21 Phenoxyethanol - -     22 Phenyl Salicylate - -     23 Povidone Iodine - -     24 Sodium Benzoate - -    65 25 Sodium Disulfite - -     26 Sorbic Acid - -     27 Thimerosal - -     28 Melamine Formaldehyde Resin - -     29 Ethylenediamine Dihydrochloride - -      Parabens      70 30 Butyl-P-Hydroxybenzoate - -     31 Ethyl-P-Hydroxybenzoate - -     32 Methyl-P-Hydroxybenzoate - -    73 33 Propyl-P-Hydroxybenzoate - -     EMULSIFIERS & ADDITIVES       # Substance 2 days 4 days remarks   74 1 Polyethylene Glycol-400 - -    75 2 Cocamidopropyl Betaine - -     3 Amerchol L101 - -     4 Propylene Glycol - -     5 Triethanolamine - -     6 Sorbitane Sesquiolate CT - -    80 7 Isopropylmyristate - -     8 Polysorbate 80 CT - -     9 Amidoamine   (Stearamidopropyl Dimethylamine) - -     10 Oleamidopropyl Dimethylamine - -     11 Lauryl Glucoside - -    85 12 Coconut Diethanolamide  - -     13 2-Hydroxy-4-Methoxy Benzophenone (Oxybenzone) - -     14 Benzophenone-4 (Sulisobenzon) NA NA     15 Propolis - -     16 Dexpanthenol - -    90 17 Abitol - -     18 Tert-Butylhydroquinone - -     19 Benzyl Salicylate - -     20 Dimethylaminopropylamin (DMPA) - -     21 Zinc Pyrithione (Zinc Omadine)  - -    95 22 Corby(Hydroxymethyl) Nitromethane  - -      Antioxidant       23 Dodecyl Gallate - -     24 Butylhydroxyanisole (BHA) - -     25 Butylhydroxytoluene (BHT) - -     26 Di-Alpha-Tocopherol (Vit E) - -    100 27 Propyl Gallate - -     PERFUMES, FLAVORS & PLANTS        # Substance 2 days 4 days remarks   101 1 Benzyl Cinnamate - -     2  Di-Limonene (Dipentene) - -     3 Cananga Odorata (Luis Smith) (I) - -     4 Lichen Acid Mix - -    105 5 Mentha Piperita Oil (Peppermint Oil) - -     6 Sesquiterpenelactone mix - -     7 Tea Tree Oil, Oxidized - -     8 Wood Tar Mix - -     9 Abietic Acid - -    110 10 Lavendula Angustifolia Oil (Lavender Oil) - -     11 Fragrance mix II CT * 14% - -      Fragrance Mix I       12 [place] Absolute - -     13 Eugenol - -     14 Geraniol - -    115 15 Hydroxycitronellal - -     16 Isoeugenol - -     17 Cinnamic Aldehyde - -     18 Cinnamic Alcohol  - -      Fragrance mix II       19 Citronellol - -    120 20 Alpha-Hexylcinnamic Aldehyde    - -     21 Citral - -     22 Farnesol - -    123 23 Coumarin - -    Hexylcinnamic aldehyde, Coumarin, Farnesol, Hydroxyisohexy3-cyclohexene carboxaldehyde, citral, citrolellol   ANTIBIOTICS & ANTIMYCOTICS    # Substance 2 days 4 days remarks   124 1 Erythromycin - -    125 2 Framycetin Sulfate - -     3 Fusidic Acid Sodium Salt - -     4 Gentamicin Sulfate - -     5 Neomycin Sulfate - -     6 Oxytetracycline  - -    130 7 Polymyxin B Sulfate - -     8 Tetracycline-HCL - -     9 Sulfanilamide - -     10 Metronidazole - -     11 Nitrofurazone - -    135 12 Nystatin - -     13 Clotrimazole - -     14 Clioquinol 5% - -     15 Miconazole  - -    139 16 Tobramycine        Could not find Protopic and Desonide    Results of patch tests:                         Interpretation:  - Negative                    A    = Allergic      (+) Erythema    TI   = Toxic/irritant   + E + Infiltration    RaP = Relevance at Present     ++ E/I + Papulovesicle   Rpr  = Relevance Previously     +++ E/I/P + Blister     nR   = No Relevance      [] No relevant allergic reaction observed    [x] Allergic reaction diagnosed against following allergens: + Lanolin      Interpretation/ remarks:   Patient had a reaction in the patch tests to the common emulsifier Lanolin. However, the facial dermatitis could be as well  part of an irritant dermatitis based on an atopy and maybe delayed type reaction to dust mites    [x] Patient information given   [x] ACDS CAMP information's (# HMACKZWS, and [id number]) to following compounds: Lanolin   [x] General information's to following compounds: HOUSE DUST MITES info      Assessment & Plan:    ==> Final Diagnosis:     # recurrent subacute dermatitis face and periorbital  > partially allergic contact dermatitis to emulsifier Lanolin  > partially irritant or allergic dermatitis (delayed type reaction to dust mites) with atopy  * chronic illness with exacerbation, progression, side effects from treatment    # atopic predisposition with  Wheezing at night and stuffy nose morning =DDx GERD or cat?  Aggravation with RS and PND early spring and late fall = immediate sensitization to Alternaria alt  Since childhood flexural dermatitis  Clinically not relevant sensitization to grass pollens  * chronic illness with exacerbation, progression, side effects from treatment      These conclusions are made at the best of one's knowledge and belief based on the provided evidence such as patient's history and allergy test results and they can change over time or can be incomplete because of missing information's.    ==> Treatment Plan:    >> follow the recommendations of the CAMP Adele and avoid all products on skin (incl hair) containing Lanolin    >> try in face Elidel cream 2xdaily (Protopic was burning too much) --> included MTM team to check if no Lanolin in there and to help with PA (alternatives Eucrisa or Opzelura)    >> try to reduce exposure to dust mites (info given)    >> symbicort inhaler 160/4.5 2xdaily and Albuterol in reserve (will have in Februarly the lung function test)  >> maybe sent patient later to GI evaluation for GERD (Famotidine not yet effective, but needs more time, PPIs not possible for kidney problem)  ___________________________    Staff: : provider    Follow-up in Derm-Allergy  clinic in about 2 months  ___________________________    I spent a total of 32 minutes with Quan Davidson during today s  visit. This time was spent discussing all the individual test results, correlating them to the clinical relevance, counseling the patient and/or coordinating care. Moreover time was spent to install and explain the CAMP Adele from [employer] Contact [employer]erm[employer] with the informations on the individual allergens and propositions of products that can be used. Please see Assessment and Plan for additional details.

## 2024-01-19 NOTE — LETTER
1/19/2024         RE: Quan Davidson  [address]  [place] [facility] [place] [address]        Dear Colleague,    Thank you for referring your patient, Quan Davidson, to the [facility] [place]. Please see a copy of my visit note below.    [facility] Dermato-allergology Note  Office visit  Encounter Date: Jan 19, 2024  ____________________________________________    CC: RECHECK (Patch day 5)      HPI:  (Jan 19, 2024)  Mr. Quan Davidson is a(n) 18 year old male who presents today as a return patient for allergy consultation  - Follow-up in Derm-Allergy clinic for 12nd readings and final conclusions after 4 days   - otherwise feeling well in usual state of health    Physical exam:  General: In no acute distress, well-developed, well-nourished  Eyes: no conjunctivitis  ENT: no signs of rhinitis   Pulmonary: no wheezing or coughing  Skin:Focused examination of the skin on test sites was performed = see test results below    Earlier History and Allergy exams:  (Atco 15, 2024)  - Follow-up in Derm-Allergy clinic for patch tests as planned and evaluation if any delayed reaction to IDT    Earlier History and Allergy exams:  (Taco 10, 2024)  - sent by PCP for recurrent dermatitis in face and periorbital and flexural areas of left arm and now still hyperpigmemtations periorbital and on arms.   - Started about 8 months ago and since 4 months much less problems.  - uses several times daily Hydrocortisone cream and if he doesn't use it the rash comes back  - only used Protopic one time, because it was burning a lot    Skin: in a photo from October 2nd 2023 desquamation and lichenification around the left eye with some papules --> subacute eczema, not really Rosacea    Past Medical History:   Patient Active Problem List   Diagnosis     Elevated blood pressure     Pre-diabetes     Class 3 severe obesity with serious comorbidity and body mass index (BMI) of 50.0 to 59.9 in  adult (H)     Past Medical History:   Diagnosis Date     Glaucoma (increased eye pressure)      Term birth of male       Traumatic brain injury (H) 2014     UPJ (ureteropelvic junction) obstruction 03/10/2015     UPJ obstruction, congenital 2014    identified on imaging for unrelated trauma       Allergies:  Allergies   Allergen Reactions     Ibuprofen        Medications:  Current Outpatient Medications   Medication     albuterol (PROAIR HFA/PROVENTIL HFA/VENTOLIN HFA) 108 (90 Base) MCG/ACT inhaler     budesonide-formoterol (SYMBICORT) 160-4.5 MCG/ACT Inhaler     cyclobenzaprine (FLEXERIL) 10 MG tablet     insulin pen needle (31G X 5 MM) 31G X 5 MM miscellaneous     liraglutide - Weight Management (SAXENDA) 18 MG/3ML pen     naltrexone (DEPADE/REVIA) 50 MG tablet     No current facility-administered medications for this visit.       Social History:  The patient works in the moment not (just got high school diploma). Patient has the following hobbies or non-occupational exposure: music (oshea)    Family History:  Family History   Problem Relation Age of Onset     Diabetes Father      Hypertension Father    Younger brother has generalized eczema    Previous Labs, Allergy Tests, Dermatopathology, Imaging:  Consult by Dr. Jhaveri 2023    Assessment & Plan:   # Eyelid dermatitis, suspect ACD  # Mild flexural eczema, keratosis pilaris, and rhinitis likely atopic predisposition   Physical exam as below likely consistent with a allergic contact dermatitis given eyelid involvement.  Likely has a background  of atopic dermatitis given other concurrent findings and symptoms.  Etiology natural history of this condition was discussed with the patient as well as treatment options and the rationale for use.  We will start topical therapies including desonide ointment twice daily for the next 2 weeks.  Side effects of steroids discussed including risk of glaucoma and cataracts with prolonged use.  We will  then transition to topical tacrolimus thereafter with repeat use of desonide as needed.  Additionally allergy referral was placed for further evaluation and work-up.   - desonide (DESOWEN) 0.05 % external ointment; Apply topically 2 times daily  Dispense: 60 g; Refill: 1  - tacrolimus (PROTOPIC) 0.1 % external ointment; Apply topically 2 times daily  Dispense: 60 g; Refill: 1  - stop doxycycline, stop metronidazole  - Adult Dermatology Referral; Future    Referred By: Lashawn Jhaveri MD  [address] [place] [address]     Allergy Tests:    Past Allergy Test    Order for Future Allergy Testing:    [] Outpatient  [] Inpatient: Law..../ Bed ....       Skin Atopy (atopic dermatitis) [x] Yes   [] No since childhood flexural dermatitis  Contact allergies:   [] Yes   [x] No ..........  Hand eczema:   [] Yes   [x] No           Leading hand:   [] R   [] L       [] Ambidextrous         Drug allergies:        [] Yes   [x] No  which?..for kidney reasons no Ibuprofen.    Urticaria/Angioedema  [x] Yes   [] No 5 years ago hives  Food Allergy:  [] Yes   [x] No  which?......  Pets :  [x] Yes   [] No  which?.cats and dogs at home        [x]  Rhinitis   [] Conjunctivitis   [x] Sinusitis   [] Polyposis   [] Otitis   [] Pharyngitis         [x]  Postnasal drip    []  None for many years wakes up with stuffy and Rhinosinusitis/PND in early spring and late fall  Operations:   [] Tonsils   [] Septum   [] Sinus   [] Polyposis        [x] Asthma bronchiale   [x] Coughing      []  None uses inhaler for wheezing at night (worse since 3 months ==> uses 2xdaily Albuterol 10pm and morning)  Symptoms (mostly Rhinoconjunctivitis and Asthma) aggravated by:  Season   [] I   [] II   [x] III   [x] IV   []V   []VI   []VII   []VIII   []IX   [x]X   [x]XI   []XII     [x] perennial   Day time      [x] morning   [] noon      [] evening        [] night    [] whole day........  []  none  Location/changes    [] inside        [] outside   []  mountains    [] sea     [] others.............   []  none  Triggers, specific     [] animals     [] plants     [] dust              [] others ...........................    []  none  Triggers, others       [] work          [] psyche    [] sport            [] others .............................  []  none  Irritant                [] phys efforts [] smoke    [] heat/cold     [] odors  []others............... []  none    Order for PATCH TESTS  Reason for tests (suspected allergy): periorbital and facial eczema  Known previous allergies: none  Standardized panels  [x] Standard panel (40 tests)  [x] Preservatives & Antimicrobials (31 tests)  [x] Emulsifiers & Additives (25 tests)   [x] Perfumes/Flavours & Plants (25 tests)  [] Hairdresser panel (12 tests)  [] Rubber Chemicals (22 tests)  [] Plastics (26 tests)  [] Colorants/Dyes/Food additives (20 tests)  [] Metals (implants/dental) (24 tests)  [] Local anaesthetics/NSAIDs (13 tests)  [x] Antibiotics & Antimycotics (14 tests)   [] Corticosteroids (15 tests)   [] Photopatch test (62 tests)   [] others: ...      [x] Patient's own products: .Protopic and Desonide  DO NOT test if chemical or biological identity is unknown!   always ask from patient the product information and safety sheets (MSDS)       Order for PRICK TESTS    Reason for tests (suspected allergy): atopy?  Known previous allergies: n/a    Standardized prick panels  [x] Atopic panel (20 tests)  [] Pediatric Panel (12 tests)  [] Milk, Meat, Eggs, Grains (20 tests)   [] Dust, Epithelia, Feathers (10 tests)  [] Fish, Seafood, Shellfish (17 tests)  [] Nuts, Beans (14 tests)  [] Spice, Vegetable, Fruit (17 tests)  [] Pollen Panel = Tree, Grass, Weed (24 tests)  [] Others: ...      [] Patient's own products: ...  DO NOT test if chemical or biological identity is unknown!   always ask from patient the product information and safety sheets (MSDS)     Standardized intradermal tests  [] Penicillium notatum []  Aspergillus fumigatus [] House dust mites D.far & D. pteron  [] Cat    [] dog  [] Others: ...  [] Bee venom   [] Wasp venom  !!Specific protocol with dilutions!!       Order for Drug allergy tests (prick & Intradermal &  patch tests)    [] Penicillin G  [] Ampicillin [] Cefazolin   [] Ceftriaxone   [] Ceftazidime  [] Bactrim    [] Others: ...  Order for ... as test date    Atopy Screen (Placed Taco 10, 2024)  No Substance Readings (15 min) Evaluation   POS Histamine 1mg/ml ++    NEG NaCl 0.9% -      No Substance Readings (15 min) Evaluation   1 Alternaria alternata (tenuis)  -    2 Cladosporium herbarum -    3 Aspergillus fumigatus -    4 Penicillium notatum -    5 Dermatophagoides pteronyssinus -    6 Dermatophagoides farinae -    7 Dog epithelium (canis spp) -    8 Cat hair (debbi catus) -    9 Cockroach   (Blatella americana & germanica) -    10 Grass mix midwest   (Nayely, Orchard, Redtop, Denis) ++    11 Kurt grass (sorghum halepense) -    12 Weed mix   (common Cocklebur, Lamb s quarters, rough redroot Pigweed, Dock/Sorrel) -    13 Mug wort (artemisia vulgare) -    14 Ragweed giant/short (ambrosia spp) -    15 White birch (Betula papyrifera) -    16 Tree mix 1 (Pecan, Maple BHR, Oak RVW, american Wilkinson, black Dillon) -    17 Red cedar (juniperus virginia) -    18 Tree mix 2   (white Zain, river/red Birch, black [place], common [place], american Elm) -    19 Box elder/Maple mix (acer spp) -    20 Aitkin shagbark (carya ovata) -           Conclusion       Intradermal Testing (Placed Taco 10, 2024)  No Substance Conc.  Reading (15min)  immediate Papule [mm] / Erythema [mm] Reading   (5 days)  delayed Papule [mm] / Erythema [mm] Remarks   DF Standard Dust Mite - D. Farinae 1:10 -  + 5/5    DP Standard Dust Mite - D. Pteronyssinus 1:10 -  (+) 2/2    A Aspergillus fumigatus  1:10 -   -    P Penicillium notatum 1:10 -   -     Alternaria alt 1:10 +/++ 5/8  -     Dog epithelium 1:10 (+) 4/4  -     Cat epithelium  1:10 + 5/6      Conclusions:     ________________________________  RESULTS & EVALUATION of PATCH TESTS    Taco 15, 2024 application of patch tests:    Patch test readings after     [x] 2 days, [] 3 days [x] 4 days, [] 5 days,  Other duration: ...    STANDARD Series                                          # Substance 2 days 4 days remarks     1 Venkatesh Mix [C] - -       2 Colophony - -       3  2-Mercaptobenzothiazole  - -       4 Methylisothiazolinone - -       5 Carba Mix - -       6 Thiuram Mix [A] - -       7 Bisphenol A Epoxy Resin - -       8 P-Klou-Ujowitnyhuz-Formaldehyde Resin - -       9 Mercapto Mix [A] - -       10 Black Rubber Mix- PPD [B] - -       11 Potassium Dichromate  -  -       12 Balsam of [place] (Myroxylon Pereirae Resin) - -       13 Nickel Sulphate Hexahydrate - -       14 Mixed Dialkyl Thiourea - -       15 Paraben Mix [B] - -       16 Methyldibromo Glutaronitrile - -       17 Fragrance Mix 8% - -       18 2-Bromo-2-Nitropropane-1,3-Diol (Bronopol) CT - -       19 Lyral - -       20 Tixocortol-21- Pivalate CT - -       21 Diazolidinyl urea (Germall II) - -        22 Methyl Methacrylate - -       23 Cobalt (II) Chloride Hexahydrate - -       24 Fragrance Mix II  - -       25 Compositae Mix - -       26 Benzoyl Peroxide - -       27 Bacitracin - -       28 Formaldehyde - -       29 Methylchloroisothiazolinone / Methylisothiazolinone - -       30 Corticosteroid Mix CT - -       31 Sodium Lauryl Sulfate - -       32 Lanolin Alcohol - +       33 Turpentine - -       34 Cetylstearylalcohol - -       35 Chlorhexidine Dicluconate - -       36 Budesonide - -       37 Imidazolidinyl Urea  - -       38 Ethyl-2 Cyanoacrylate - -       39 Quaternium 15 (Dowicil 200) - -       40 Decyl Glucoside - -      PRESERVATIVES & ANTIMICROBIALS        # Substance 2 days 4 days remarks   41 1  1,2-Benzisothiazoline-3-One, Sodium Salt - -     2  1,3,5-Corby (2-Hydroxyethyl) - Hexahydrotriazine (Juan Luis PLUMMER) - -     3  2-Onsixlhflrltp-6-Nitro-1, 3-Propanediol NA NA     4  3, 4, 4' - Triclocarban - -    45 5 4 - Chloro - 3 - Cresol - -     6 4 - Chloro - 4 - Xylenol (PCMX) - -     7 7-Ethylbicyclooxazolidine (Bioban [id number]) - -     8 Benzalkonium Chloride CT - -     9 Benzyl Alcohol - -    50 10 Cetalkonium Chloride - -     11 Cetylpyrimidine Chloride  - -     12 Chloroacetamide - -     13 DMDM Hydantoin - -     14 Glutaraldehyde - -    55 15 Triclosan - -     16 Glyoxal Trimeric Dihydrate - -     17 Iodopropynyl Butylcarbamate - -     18 Octylisothiazoline - -     19 Bithionol CT NA NA    60 20 Bioban P 1487 (Nitrobutyl) Morpholine/(Ethylnitro-Trimethylene) Dimorpholine - -     21 Phenoxyethanol - -     22 Phenyl Salicylate - -     23 Povidone Iodine - -     24 Sodium Benzoate - -    65 25 Sodium Disulfite - -     26 Sorbic Acid - -     27 Thimerosal - -     28 Melamine Formaldehyde Resin - -     29 Ethylenediamine Dihydrochloride - -      Parabens      70 30 Butyl-P-Hydroxybenzoate - -     31 Ethyl-P-Hydroxybenzoate - -     32 Methyl-P-Hydroxybenzoate - -    73 33 Propyl-P-Hydroxybenzoate - -     EMULSIFIERS & ADDITIVES       # Substance 2 days 4 days remarks   74 1 Polyethylene Glycol-400 - -    75 2 Cocamidopropyl Betaine - -     3 Amerchol L101 - -     4 Propylene Glycol - -     5 Triethanolamine - -     6 Sorbitane Sesquiolate CT - -    80 7 Isopropylmyristate - -     8 Polysorbate 80 CT - -     9 Amidoamine   (Stearamidopropyl Dimethylamine) - -     10 Oleamidopropyl Dimethylamine - -     11 Lauryl Glucoside - -    85 12 Coconut Diethanolamide  - -     13 2-Hydroxy-4-Methoxy Benzophenone (Oxybenzone) - -     14 Benzophenone-4 (Sulisobenzon) NA NA     15 Propolis - -     16 Dexpanthenol - -    90 17 Abitol - -     18 Tert-Butylhydroquinone - -     19 Benzyl Salicylate - -     20 Dimethylaminopropylamin (DMPA) - -     21 Zinc Pyrithione (Zinc Omadine)  - -    95 22 Corby(Hydroxymethyl) Nitromethane  - -      Antioxidant        23 Dodecyl Gallate - -     24 Butylhydroxyanisole (BHA) - -     25 Butylhydroxytoluene (BHT) - -     26 Di-Alpha-Tocopherol (Vit E) - -    100 27 Propyl Gallate - -     PERFUMES, FLAVORS & PLANTS        # Substance 2 days 4 days remarks   101 1 Benzyl Cinnamate - -     2 Di-Limonene (Dipentene) - -     3 Cananga Odorata (Luis Smith) (I) - -     4 Lichen Acid Mix - -    105 5 Mentha Piperita Oil (Peppermint Oil) - -     6 Sesquiterpenelactone mix - -     7 Tea Tree Oil, Oxidized - -     8 Wood Tar Mix - -     9 Abietic Acid - -    110 10 Lavendula Angustifolia Oil (Lavender Oil) - -     11 Fragrance mix II CT * 14% - -      Fragrance Mix I       12 Oakmoss Absolute - -     13 Eugenol - -     14 Geraniol - -    115 15 Hydroxycitronellal - -     16 Isoeugenol - -     17 Cinnamic Aldehyde - -     18 Cinnamic Alcohol  - -      Fragrance mix II       19 Citronellol - -    120 20 Alpha-Hexylcinnamic Aldehyde    - -     21 Citral - -     22 Farnesol - -    123 23 Coumarin - -    Hexylcinnamic aldehyde, Coumarin, Farnesol, Hydroxyisohexy3-cyclohexene carboxaldehyde, citral, citrolellol   ANTIBIOTICS & ANTIMYCOTICS    # Substance 2 days 4 days remarks   124 1 Erythromycin - -    125 2 Framycetin Sulfate - -     3 Fusidic Acid Sodium Salt - -     4 Gentamicin Sulfate - -     5 Neomycin Sulfate - -     6 Oxytetracycline  - -    130 7 Polymyxin B Sulfate - -     8 Tetracycline-HCL - -     9 Sulfanilamide - -     10 Metronidazole - -     11 Nitrofurazone - -    135 12 Nystatin - -     13 Clotrimazole - -     14 Clioquinol 5% - -     15 Miconazole  - -    139 16 Tobramycine        Could not find Protopic and Desonide    Results of patch tests:                         Interpretation:  - Negative                    A    = Allergic      (+) Erythema    TI   = Toxic/irritant   + E + Infiltration    RaP = Relevance at Present     ++ E/I + Papulovesicle   Rpr  = Relevance Previously     +++ E/I/P + Blister     nR   = No  Relevance      [] No relevant allergic reaction observed    [x] Allergic reaction diagnosed against following allergens: + Lanolin      Interpretation/ remarks:   Patient had a reaction in the patch tests to the common emulsifier Lanolin. However, the facial dermatitis could be as well part of an irritant dermatitis based on an atopy and maybe delayed type reaction to dust mites    [x] Patient information given   [x] ACDS CAMP information's (# [id number], and [id number]) to following compounds: Lanolin   [x] General information's to following compounds: HOUSE DUST MITES info      Assessment & Plan:    ==> Final Diagnosis:     # recurrent subacute dermatitis face and periorbital  > partially allergic contact dermatitis to emulsifier Lanolin  > partially irritant or allergic dermatitis (delayed type reaction to dust mites) with atopy  * chronic illness with exacerbation, progression, side effects from treatment    # atopic predisposition with  Wheezing at night and stuffy nose morning =DDx GERD or cat?  Aggravation with RS and PND early spring and late fall = immediate sensitization to Alternaria alt  Since childhood flexural dermatitis  Clinically not relevant sensitization to grass pollens  * chronic illness with exacerbation, progression, side effects from treatment      These conclusions are made at the best of one's knowledge and belief based on the provided evidence such as patient's history and allergy test results and they can change over time or can be incomplete because of missing information's.    ==> Treatment Plan:    >> follow the recommendations of the CAMP Adele and avoid all products on skin (incl hair) containing Lanolin    >> try in face Elidel cream 2xdaily (Protopic was burning too much) --> included MTM team to check if no Lanolin in there and to help with PA (alternatives Eucrisa or Opzelura)    >> try to reduce exposure to dust mites (info given)    >> symbicort inhaler 160/4.5 2xdaily and  Albuterol in reserve (will have in Februarly the lung function test)  >> maybe sent patient later to GI evaluation for GERD (Famotidine not yet effective, but needs more time, PPIs not possible for kidney problem)  ___________________________    Staff: : provider    Follow-up in Derm-Allergy clinic in about 2 months  ___________________________    I spent a total of 32 minutes with Quandelroy Davidson during today s  visit. This time was spent discussing all the individual test results, correlating them to the clinical relevance, counseling the patient and/or coordinating care. Moreover time was spent to install and explain the CAMP Adele from [employer] society with the informations on the individual allergens and propositions of products that can be used. Please see Assessment and Plan for additional details.            Again, thank you for allowing me to participate in the care of your patient.        Sincerely,        Bebeto Vinson MD

## 2024-01-22 DIAGNOSIS — I10 ESSENTIAL HYPERTENSION: Primary | ICD-10-CM

## 2024-01-22 NOTE — PROGRESS NOTES
Medication Therapy Management (MTM) Encounter    ASSESSMENT:                            Atopic dermatitis: Uncontrolled, hydrocortisone only partially effective. Pimecrolimus cream PA pending -- will check inactive ingredients once approved pending  his pharmacy carries. I will be in touch with him on MyChart.   Encouraged him to discuss the hyperpigmentation with Dr. Couch at derm appt in March.     SOB: Stable. Recommended to continue current regimen.     Class 3 severe obesity with serious comorbidity and body mass index (BMI) of 50.0 to 59.9 in adult/pre-diabetes: Continue current regimen and titrating up. He will be meeting with Natalie next month. Reviewed the concerns he has about Wegovy -- discussed the cardiac protection and pancreatitis risk of all GLP1s, not unique to Wegovy. He is more interested in Zepbound which would be a great alternative to Saxenda. Reviewed monitoring for side effects once he increases to 3 mg and to reach out for a refill once he is on his last pen. Will continue to monitor his A1c.     GERD: Patient is on a high dose of famotidine. Sounds like his ENT was hesitant of starting a PPI, but could consider in the future with close SCr monitoring. Will watch for improvement in GERD sx however with weight loss.     PLAN:                            Pimecrolimus PA pending -- once approved, I will call pharmacy to check  and inactive ingredients    Follow-up: via [employer] pending pimecrolimus PA    SUBJECTIVE/OBJECTIVE:                          Jose Daniel Davidson is a 18 year old male contacted via secure video for an initial visit. He was referred to me from Dr. Vinson.      Reason for visit: facial atopic dermatitis with allergic contact dermatitis to Lanolin. I prescribed Elidel cream (because Protopic was burning and irritating). Can you help to get Elidel taken over by insurance and check that Elidel doesnt contain lanolin. If it would contain Lanolin,  alternatives would be Eucrisa cream or Opzelura cream  Medication Adherence/Access: Familiar with his medications, no concerns.     Atopic dermatitis:   - hydrocortisone 1% cream   Established with Dr. Vinson on 1/10/24. Referred by PCP for recurrent dermatitis in face and periorbital and flexural areas of left arm and now still hyperpigmentations periorbital and on arms.   Started about 8 months ago and since 4 months much less problems. Today he reports that he continues to have the rash around his eyes, but his arms have resolved.   Is currently only using hydrocortisone on his face which is helpful, but does not completely resolve the itching, however it is the best treatment he has found. He is concerned about the hyperpigmentation and whether that is permanent.   Has tried Protopic but it made his eyes burn. He used it on his arms and he tolerated it fine there.   On 1/19/24 allergy testing was reviewed and it was recommended to avoid all products on skin and hair that contain Lanolin.   Per Dr. Vinson, recommended to try on face Elidel cream 2xdaily -- PA is pending.     SOB:   - Symbicort 160-4.5 mcg 2 puffs TWICE DAILY   - albuterol HFA PRN   On 1/10/24 was started on Symbicort by Dr. Vinson  PFTs on 2/2/24  Just using the Symbicort, no need for the albuterol. Rinses mouth after.   Reports wheezing at night is gone. Still feels heavy in his breathing however.     Class 3 severe obesity with serious comorbidity and body mass index (BMI) of 50.0 to 59.9 in adult/pre-diabetes:  - Saxenda 2.4 mg daily   - naltrexone 50 mg daily AM   Following with endo.   Started on Saxenda on 11/16/23. Goal to transition to Wegovy or Zepbound in the future.  Today he reports that he is hesitant about Wegovy since he has heard negative things from family members (food stuck in throat, pancreatitis, cardiac issues).   3 more pens at home.   No nausea. Reports now on higher dose he is seeing changes in appetite.   Now  when full he cannot force himself to eat more. The big thing he notices the first feeling when full he is then done.   310 lbs at home.   Lab Results   Component Value Date    A1C 6.0 09/06/2023     Wt Readings from Last 3 Encounters:   01/12/24 143.8 kg (317 lb)   01/06/24 143.8 kg (317 lb)   11/28/23 145.2 kg (320 lb)     GERD:   - Famotidine 40 mg twice daily  Reports acid reflux. Continues to have some sx, but reports he was told to wait 3 months to see improvement. Reports that PPI was not recommended since he has one kidney.   Following with ENT    Today's Vitals: There were no vitals taken for this visit.    Allergies/ADRs: Reviewed in chart  Past Medical History: Reviewed in chart  Tobacco: He reports that he has never smoked. He has been exposed to tobacco smoke. He has never used smokeless tobacco.  Alcohol: reviewed in chart    ----------------  Post Discharge Medication Reconciliation Status: discharge medications reconciled and changed, per note/orders.    I spent 23 minutes with this patient today. All changes were made via collaborative practice agreement with Bebeto Vinson. A copy of the visit note was provided to the patient's provider(s).    A summary of these recommendations was declined by the patient.    Cierra Kyle, Pharm.D., [facility]   Medication Therapy Management Pharmacist   [facility]    Telemedicine Visit Details  Type of service:  Video Conference via [employer]  Start Time:  9:58 AM  End Time:  10:21 AM     Medication Therapy Recommendations  No medication therapy recommendations to display

## 2024-01-23 ENCOUNTER — VIRTUAL VISIT (OUTPATIENT)
Dept: RHEUMATOLOGY | Facility: CLINIC | Age: 19
End: 2024-01-23
Attending: DERMATOLOGY
Payer: COMMERCIAL

## 2024-01-23 ENCOUNTER — HOSPITAL ENCOUNTER (OUTPATIENT)
Dept: CARDIOLOGY | Facility: CLINIC | Age: 19
Discharge: HOME | End: 2024-01-23
Attending: FAMILY MEDICINE | Admitting: FAMILY MEDICINE
Payer: COMMERCIAL

## 2024-01-23 DIAGNOSIS — R06.02 SHORTNESS OF BREATH: ICD-10-CM

## 2024-01-23 DIAGNOSIS — E66.01 CLASS 3 SEVERE OBESITY WITH SERIOUS COMORBIDITY AND BODY MASS INDEX (BMI) OF 50.0 TO 59.9 IN ADULT, UNSPECIFIED OBESITY TYPE (H): ICD-10-CM

## 2024-01-23 DIAGNOSIS — E66.813 CLASS 3 SEVERE OBESITY WITH SERIOUS COMORBIDITY AND BODY MASS INDEX (BMI) OF 50.0 TO 59.9 IN ADULT, UNSPECIFIED OBESITY TYPE (H): ICD-10-CM

## 2024-01-23 DIAGNOSIS — L20.9 ATOPIC DERMATITIS, UNSPECIFIED TYPE: Primary | ICD-10-CM

## 2024-01-23 DIAGNOSIS — K21.9 GASTROESOPHAGEAL REFLUX DISEASE, UNSPECIFIED WHETHER ESOPHAGITIS PRESENT: ICD-10-CM

## 2024-01-23 PROCEDURE — 93321 DOPPLER ECHO F-UP/LMTD STD: CPT | Mod: 26 | Performed by: STUDENT IN AN ORGANIZED HEALTH CARE EDUCATION/TRAINING PROGRAM

## 2024-01-23 PROCEDURE — 255N000002 HC RX 255 OP 636: Performed by: STUDENT IN AN ORGANIZED HEALTH CARE EDUCATION/TRAINING PROGRAM

## 2024-01-23 PROCEDURE — 93350 STRESS TTE ONLY: CPT | Mod: 26 | Performed by: STUDENT IN AN ORGANIZED HEALTH CARE EDUCATION/TRAINING PROGRAM

## 2024-01-23 PROCEDURE — 93016 CV STRESS TEST SUPVJ ONLY: CPT | Performed by: STUDENT IN AN ORGANIZED HEALTH CARE EDUCATION/TRAINING PROGRAM

## 2024-01-23 PROCEDURE — 93018 CV STRESS TEST I&R ONLY: CPT | Performed by: STUDENT IN AN ORGANIZED HEALTH CARE EDUCATION/TRAINING PROGRAM

## 2024-01-23 PROCEDURE — 93325 DOPPLER ECHO COLOR FLOW MAPG: CPT | Mod: 26 | Performed by: STUDENT IN AN ORGANIZED HEALTH CARE EDUCATION/TRAINING PROGRAM

## 2024-01-23 RX ORDER — FAMOTIDINE 40 MG/1
40 TABLET, FILM COATED ORAL 2 TIMES DAILY
COMMUNITY
Start: 2024-01-10 | End: 2024-02-05

## 2024-01-23 RX ADMIN — PERFLUTREN 5 ML: 6.52 INJECTION, SUSPENSION INTRAVENOUS at 13:34

## 2024-01-23 NOTE — Clinical Note
1/23/2024       RE: Quan Davidson  [address]  [place] [address]     Dear Colleague,    Thank you for referring your patient, Quan Davidson, to the [facility] [place] [facility]. Please see a copy of my visit note below.    Medication Therapy Management (MTM) Encounter    ASSESSMENT:                            ***:  ***      PLAN:                            ***    Follow-up: {followuptest2:918775}    SUBJECTIVE/OBJECTIVE:                          Jose Daniel Davidson is a 18 year old male contacted via secure video for an initial visit. He was referred to me from ***. {mtisitdetails:585603}     Reason for visit: facial atopic dermatitis with allergic contact dermatitis to Lanolin  Additional Information:  I prescribed Elidel cream (because Protopic was burning and irritating). Can you help to get Elidel taken over by insurance and check that Elidel doesnt contain lanolin. If it would contain Lanolin, alternatives would be Eucrisa cream or Opzelura cream  Medication Adherence/Access: [facility]742[facility]}    Atopic dermatitis:   Established with Dr. Vinson on 1/10/24. Referred by PCP for recurrent dermatitis in face and periorbital and flexural areas of left arm and now still hyperpigmemtations periorbital and on arms.   Started about 8 months ago and since 4 months much less problems.   Has tried HC cream (if doesn't use, rash returns) and Protopic (burned)  Was thought to be an allergic contact dermatitis given his eyelid involvement.   On 1/19/24 allergy testing was reviewed and it was recommended to avoid all products on skin and hair that contain Lanolin.   Recommended to try on face Elidel cream 2xdaily (Protopic was burning too much) --> included MTM team to check if no Lanolin in there and to help with PA (alternatives Eucrisa or Opzelura)     SOB:   - Symbicort 160-4.5 mcg 2 puffs TWICE DAILY   -  "albuterol HFA PRN   On 1/10/24 was started on Symbicort by Dr. Vinson  PFTs on 2/2/24    Class 3 severe obesity with serious comorbidity and body mass index (BMI) of 50.0 to 59.9 in adult:  - Saxenda *** daily   - naltrexone 50 mg 0.5 to 1 tablet   Following with endo.   Started on Saxenda on 11/16/23. Goal to transition to Wegovy or Zepbound in the future.    Lab Results   Component Value Date    A1C 6.0 09/06/2023     Wt Readings from Last 3 Encounters:   01/12/24 143.8 kg (317 lb)   01/06/24 143.8 kg (317 lb)   11/28/23 145.2 kg (320 lb)         Today's Vitals: There were no vitals taken for this visit.    Allergies/ADRs: {1/2/3/4/5:752066}  Past Medical History: {1/2/3/4/5:808486}  Tobacco: He reports that he has never smoked. He has been exposed to tobacco smoke. He has never used smokeless tobacco.  Alcohol: {ALCOHOL CONSUMPTION HX:455559}  {Social and Goals:594752}  ----------------  Post Discharge Medication Reconciliation Status: {ACO Med Rec (Provider):797041}.    I spent {mtm total time 3:126859} with this patient today. { :257897}. A copy of the visit note was provided to the patient's provider(s).    A summary of these recommendations {GIVEN/NOT GIVEN:231959}.    Cierra Kyle, Pharm.D., BCACP   Medication Therapy Management Pharmacist   [facility]    Telemedicine Visit Details  Type of service:  {telemedvisitmtm:034756::\"Telephone visit\"}  Start Time: {video/phone visit start time:152948}  End Time: {video/phone visit end time:152948}     Medication Therapy Recommendations  No medication therapy recommendations to display       Medication Therapy Management (MTM) Encounter    ASSESSMENT:                            Atopic dermatitis: Uncontrolled, hydrocortisone only partially effective. Pimecrolimus cream PA pending -- will check inactive ingredients once approved pending  his pharmacy carries. I will be in touch with him on MyChart.   Encouraged him to discuss the " hyperpigmentation with Dr. Couch at derm appt in March.     SOB: Stable. Recommended to continue current regimen.     Class 3 severe obesity with serious comorbidity and body mass index (BMI) of 50.0 to 59.9 in adult/pre-diabetes: Continue current regimen and titrating up. He will be meeting with Natalie next month. Reviewed the concerns he has about Wegovy -- discussed the cardiac protection and pancreatitis risk of all GLP1s, not unique to Wegovy. He is more interested in Zepbound which would be a great alternative to Saxenda. Reviewed monitoring for side effects once he increases to 3 mg and to reach out for a refill once he is on his last pen. Will continue to monitor his A1c.     GERD: Patient is on a high dose of famotidine. Sounds like his ENT was hesitant of starting a PPI, but could consider in the future with close SCr monitoring. Will watch for improvement in GERD sx however with weight loss.       PLAN:                            Pimecrolimus PA pending -- once approved, I will call pharmacy to check  and inactive ingredients    Follow-up: via [employer] pending pimecrolimus PA    SUBJECTIVE/OBJECTIVE:                          Jose Daniel Davidson is a 18 year old male contacted via secure video for an initial visit. He was referred to me from Dr. Vinson.      Reason for visit: facial atopic dermatitis with allergic contact dermatitis to Lanolin. I prescribed Elidel cream (because Protopic was burning and irritating). Can you help to get Elidel taken over by insurance and check that Elidel doesnt contain lanolin. If it would contain Lanolin, alternatives would be Eucrisa cream or Opzelura cream  Medication Adherence/Access: Familiar with his medications, no concerns.     Atopic dermatitis:   - hydrocortisone 1% cream   Established with Dr. Vinson on 1/10/24. Referred by PCP for recurrent dermatitis in face and periorbital and flexural areas of left arm and now still hyperpigmentations  periorbital and on arms.   Started about 8 months ago and since 4 months much less problems. Today he reports that he continues to have the rash around his eyes, but his arms have resolved.   Is currently only using hydrocortisone on his face which is helpful, but does not completely resolve the itching, however it is the best treatment he has found. He is concerned about the hyperpigmentation and whether that is permanent.   Has tried Protopic but it made his eyes burn. He used it on his arms and he tolerated it fine there.   On 1/19/24 allergy testing was reviewed and it was recommended to avoid all products on skin and hair that contain Lanolin.   Per Dr. Vinson, recommended to try on face Elidel cream 2xdaily -- PA is pending.     SOB:   - Symbicort 160-4.5 mcg 2 puffs TWICE DAILY   - albuterol HFA PRN   On 1/10/24 was started on Symbicort by Dr. Vinson  PFTs on 2/2/24  Just using the Symbicort, no need for the albuterol. Rinses mouth after.   Reports wheezing at night is gone. Still feels heavy in his breathing however.     Class 3 severe obesity with serious comorbidity and body mass index (BMI) of 50.0 to 59.9 in adult/pre-diabetes:  - Saxenda 2.4 mg daily   - naltrexone 50 mg daily AM   Following with endo.   Started on Saxenda on 11/16/23. Goal to transition to Wegovy or Zepbound in the future.  Today he reports that he is hesitant about Wegovy since he has heard negative things from family members (food stuck in throat, pancreatitis, cardiac issues).   3 more pens at home.   No nausea. Reports now on higher dose he is seeing changes in appetite.   Now when full he cannot force himself to eat more. The big thing he notices the first feeling when full he is then done.   310 lbs at home.   Lab Results   Component Value Date    A1C 6.0 09/06/2023     Wt Readings from Last 3 Encounters:   01/12/24 143.8 kg (317 lb)   01/06/24 143.8 kg (317 lb)   11/28/23 145.2 kg (320 lb)     GERD:   - Famotidine 40 mg  twice daily  Reports acid reflux. Continues to have some sx, but reports he was told to wait 3 months to see improvement. Reports that PPI was not recommended since he has one kidney.   Following with ENT    Today's Vitals: There were no vitals taken for this visit.    Allergies/ADRs: Reviewed in chart  Past Medical History: Reviewed in chart  Tobacco: He reports that he has never smoked. He has been exposed to tobacco smoke. He has never used smokeless tobacco.  Alcohol: reviewed in chart    ----------------  Post Discharge Medication Reconciliation Status: discharge medications reconciled and changed, per note/orders.    I spent 23 minutes with this patient today. All changes were made via collaborative practice agreement with Bebeto Vinson. A copy of the visit note was provided to the patient's provider(s).    A summary of these recommendations was declined by the patient.    Cierra Kyle, Pharm.D., BCACP   Medication Therapy Management Pharmacist   [facility]    Telemedicine Visit Details  Type of service:  Video Conference via [employer]  Start Time:  9:58 AM  End Time:  10:21 AM     Medication Therapy Recommendations  No medication therapy recommendations to display         Again, thank you for allowing me to participate in the care of your patient.      Sincerely,    Charito MORGAND

## 2024-01-24 DIAGNOSIS — R06.02 SHORTNESS OF BREATH: ICD-10-CM

## 2024-01-24 RX ORDER — ALBUTEROL SULFATE 90 UG/1
2 AEROSOL, METERED RESPIRATORY (INHALATION) EVERY 6 HOURS PRN
Qty: 18 G | Refills: 0 | Status: SHIPPED | OUTPATIENT
Start: 2024-01-24 | End: 2024-02-05

## 2024-01-30 ASSESSMENT — ASTHMA QUESTIONNAIRES
QUESTION_1 LAST FOUR WEEKS HOW MUCH OF THE TIME DID YOUR ASTHMA KEEP YOU FROM GETTING AS MUCH DONE AT WORK, SCHOOL OR AT HOME: SOME OF THE TIME
ACT_TOTALSCORE: 16
QUESTION_4 LAST FOUR WEEKS HOW OFTEN HAVE YOU USED YOUR RESCUE INHALER OR NEBULIZER MEDICATION (SUCH AS ALBUTEROL): NOT AT ALL
QUESTION_3 LAST FOUR WEEKS HOW OFTEN DID YOUR ASTHMA SYMPTOMS (WHEEZING, COUGHING, SHORTNESS OF BREATH, CHEST TIGHTNESS OR PAIN) WAKE YOU UP AT NIGHT OR EARLIER THAN USUAL IN THE MORNING: TWO OR THREE NIGHTS A WEEK
QUESTION_2 LAST FOUR WEEKS HOW OFTEN HAVE YOU HAD SHORTNESS OF BREATH: THREE TO SIX TIMES A WEEK
ACT_TOTALSCORE: 16
QUESTION_5 LAST FOUR WEEKS HOW WOULD YOU RATE YOUR ASTHMA CONTROL: SOMEWHAT CONTROLLED

## 2024-02-01 RX ORDER — ALBUTEROL SULFATE 0.83 MG/ML
2.5 SOLUTION RESPIRATORY (INHALATION) ONCE
Status: COMPLETED | OUTPATIENT
Start: 2024-02-02 | End: 2024-02-02

## 2024-02-02 ENCOUNTER — HOSPITAL ENCOUNTER (OUTPATIENT)
Dept: RESPIRATORY THERAPY | Facility: CLINIC | Age: 19
Discharge: HOME | End: 2024-02-02
Attending: DERMATOLOGY | Admitting: DERMATOLOGY
Payer: COMMERCIAL

## 2024-02-02 DIAGNOSIS — J45.21 MILD INTERMITTENT ASTHMA WITH ACUTE EXACERBATION: Primary | ICD-10-CM

## 2024-02-02 LAB
DLCOCOR-%PRED-PRE: 131 %
DLCOCOR-PRE: 35.98 ML/MIN/MMHG
DLCOUNC-%PRED-PRE: 133 %
DLCOUNC-PRE: 36.77 ML/MIN/MMHG
DLCOUNC-PRED: 27.45 ML/MIN/MMHG
ERV-%PRED-PRE: 83 %
ERV-PRE: 1.28 L
ERV-PRED: 1.53 L
EXPTIME-PRE: 5.19 SEC
FEF2575-%PRED-POST: 132 %
FEF2575-%PRED-PRE: 133 %
FEF2575-POST: 5.44 L/SEC
FEF2575-PRE: 5.52 L/SEC
FEF2575-PRED: 4.12 L/SEC
FEFMAX-%PRED-PRE: 99 %
FEFMAX-PRE: 8.72 L/SEC
FEFMAX-PRED: 8.8 L/SEC
FEV1-%PRED-PRE: 132 %
FEV1-PRE: 4.72 L
FEV1FEV6-PRE: 89 %
FEV1FEV6-PRED: 84 %
FEV1FVC-PRE: 89 %
FEV1FVC-PRED: 88 %
FEV1SVC-PRE: 89 %
FEV1SVC-PRED: 72 %
FIFMAX-PRE: 6.89 L/SEC
FRCPLETH-%PRED-PRE: 71 %
FRCPLETH-PRE: 2.1 L
FRCPLETH-PRED: 2.94 L
FVC-%PRED-PRE: 129 %
FVC-PRE: 5.29 L
FVC-PRED: 4.09 L
HGB BLD-MCNC: 15.4 G/DL (ref 13.3–17.7)
IC-%PRED-PRE: 132 %
IC-PRE: 4.04 L
IC-PRED: 3.06 L
RVPLETH-%PRED-PRE: 56 %
RVPLETH-PRE: 0.83 L
RVPLETH-PRED: 1.45 L
TLCPLETH-%PRED-PRE: 103 %
TLCPLETH-PRE: 6.14 L
TLCPLETH-PRED: 5.91 L
VA-%PRED-PRE: 126 %
VA-PRE: 6.44 L
VC-%PRED-PRE: 107 %
VC-PRE: 5.31 L
VC-PRED: 4.94 L

## 2024-02-02 PROCEDURE — 94729 DIFFUSING CAPACITY: CPT | Mod: 26 | Performed by: INTERNAL MEDICINE

## 2024-02-02 PROCEDURE — 94729 DIFFUSING CAPACITY: CPT

## 2024-02-02 PROCEDURE — 36415 COLL VENOUS BLD VENIPUNCTURE: CPT | Performed by: DERMATOLOGY

## 2024-02-02 PROCEDURE — 85018 HEMOGLOBIN: CPT | Performed by: DERMATOLOGY

## 2024-02-02 PROCEDURE — 94726 PLETHYSMOGRAPHY LUNG VOLUMES: CPT | Mod: 26 | Performed by: INTERNAL MEDICINE

## 2024-02-02 PROCEDURE — 999N000157 HC STATISTIC RCP TIME EA 10 MIN

## 2024-02-02 PROCEDURE — 94726 PLETHYSMOGRAPHY LUNG VOLUMES: CPT

## 2024-02-02 PROCEDURE — 94060 EVALUATION OF WHEEZING: CPT

## 2024-02-02 PROCEDURE — 94060 EVALUATION OF WHEEZING: CPT | Mod: 26 | Performed by: INTERNAL MEDICINE

## 2024-02-02 PROCEDURE — 250N000009 HC RX 250: Performed by: DERMATOLOGY

## 2024-02-02 RX ADMIN — ALBUTEROL SULFATE 2.5 MG: 2.5 SOLUTION RESPIRATORY (INHALATION) at 10:24

## 2024-02-02 NOTE — PROGRESS NOTES
RESPIRATORY CARE NOTE    Complete Pulmonary Function Test completed by patient.  Good patient effort and cooperation. Albuterol 2.5 mg neb given for bronchodilation.  The results of this test meet the ATS standards for acceptability and repeatability. PT returned to baseline and left in no distress.    Zulma Weeks, RT  2/2/2024

## 2024-02-05 ENCOUNTER — OFFICE VISIT (OUTPATIENT)
Dept: FAMILY MEDICINE | Facility: CLINIC | Age: 19
End: 2024-02-05
Payer: COMMERCIAL

## 2024-02-05 ENCOUNTER — VIRTUAL VISIT (OUTPATIENT)
Dept: ENDOCRINOLOGY | Facility: CLINIC | Age: 19
End: 2024-02-05
Payer: COMMERCIAL

## 2024-02-05 VITALS — BODY MASS INDEX: 51.82 KG/M2 | HEIGHT: 65 IN | WEIGHT: 311 LBS

## 2024-02-05 VITALS
WEIGHT: 309.8 LBS | HEART RATE: 78 BPM | SYSTOLIC BLOOD PRESSURE: 140 MMHG | DIASTOLIC BLOOD PRESSURE: 90 MMHG | TEMPERATURE: 98.7 F | HEIGHT: 65 IN | BODY MASS INDEX: 51.61 KG/M2 | OXYGEN SATURATION: 97 % | RESPIRATION RATE: 22 BRPM

## 2024-02-05 DIAGNOSIS — R49.0 VOICE HOARSENESS: ICD-10-CM

## 2024-02-05 DIAGNOSIS — E66.813 CLASS 3 SEVERE OBESITY WITH SERIOUS COMORBIDITY AND BODY MASS INDEX (BMI) OF 50.0 TO 59.9 IN ADULT, UNSPECIFIED OBESITY TYPE (H): Primary | ICD-10-CM

## 2024-02-05 DIAGNOSIS — Z71.3 NUTRITIONAL COUNSELING: ICD-10-CM

## 2024-02-05 DIAGNOSIS — H01.119 CONTACT DERMATITIS OF EYELID, UNSPECIFIED LATERALITY: Primary | ICD-10-CM

## 2024-02-05 DIAGNOSIS — E66.01 CLASS 3 SEVERE OBESITY WITH SERIOUS COMORBIDITY AND BODY MASS INDEX (BMI) OF 50.0 TO 59.9 IN ADULT, UNSPECIFIED OBESITY TYPE (H): Primary | ICD-10-CM

## 2024-02-05 DIAGNOSIS — R07.9 CHEST PAIN, UNSPECIFIED TYPE: ICD-10-CM

## 2024-02-05 DIAGNOSIS — R09.81 NASAL CONGESTION: ICD-10-CM

## 2024-02-05 DIAGNOSIS — R06.02 SHORTNESS OF BREATH: ICD-10-CM

## 2024-02-05 DIAGNOSIS — E66.01 MORBID OBESITY (H): ICD-10-CM

## 2024-02-05 DIAGNOSIS — E66.813 CLASS 3 SEVERE OBESITY WITH SERIOUS COMORBIDITY AND BODY MASS INDEX (BMI) OF 50.0 TO 59.9 IN ADULT, UNSPECIFIED OBESITY TYPE (H): ICD-10-CM

## 2024-02-05 DIAGNOSIS — E66.01 CLASS 3 SEVERE OBESITY WITH SERIOUS COMORBIDITY AND BODY MASS INDEX (BMI) OF 50.0 TO 59.9 IN ADULT, UNSPECIFIED OBESITY TYPE (H): ICD-10-CM

## 2024-02-05 DIAGNOSIS — J45.21 MILD INTERMITTENT ASTHMA WITH ACUTE EXACERBATION: ICD-10-CM

## 2024-02-05 PROCEDURE — 99207 PR NO CHARGE LOS: CPT | Mod: 95 | Performed by: DIETITIAN, REGISTERED

## 2024-02-05 PROCEDURE — 99214 OFFICE O/P EST MOD 30 MIN: CPT | Performed by: FAMILY MEDICINE

## 2024-02-05 PROCEDURE — 97803 MED NUTRITION INDIV SUBSEQ: CPT | Mod: 95 | Performed by: DIETITIAN, REGISTERED

## 2024-02-05 RX ORDER — FAMOTIDINE 40 MG/1
40 TABLET, FILM COATED ORAL 2 TIMES DAILY
Qty: 90 TABLET | Refills: 1 | Status: SHIPPED | OUTPATIENT
Start: 2024-02-05

## 2024-02-05 RX ORDER — SEMAGLUTIDE 1.7 MG/.75ML
1.7 INJECTION, SOLUTION SUBCUTANEOUS WEEKLY
Qty: 3 ML | Refills: 3 | Status: SHIPPED | OUTPATIENT
Start: 2024-02-05 | End: 2024-03-29

## 2024-02-05 RX ORDER — NALTREXONE HYDROCHLORIDE 50 MG/1
TABLET, FILM COATED ORAL
Qty: 30 TABLET | Refills: 3 | Status: SHIPPED | OUTPATIENT
Start: 2024-02-05 | End: 2024-04-02

## 2024-02-05 RX ORDER — OLOPATADINE HYDROCHLORIDE 1 MG/ML
1 SOLUTION/ DROPS OPHTHALMIC 2 TIMES DAILY
Qty: 5 ML | Refills: 1 | Status: SHIPPED | OUTPATIENT
Start: 2024-02-05 | End: 2024-05-07

## 2024-02-05 RX ORDER — ALBUTEROL SULFATE 90 UG/1
2 AEROSOL, METERED RESPIRATORY (INHALATION) EVERY 6 HOURS PRN
Qty: 18 G | Refills: 0 | Status: SHIPPED | OUTPATIENT
Start: 2024-02-05 | End: 2024-02-28

## 2024-02-05 RX ORDER — BUDESONIDE AND FORMOTEROL FUMARATE DIHYDRATE 160; 4.5 UG/1; UG/1
2 AEROSOL RESPIRATORY (INHALATION) 2 TIMES DAILY
Qty: 10.2 G | Refills: 3 | Status: SHIPPED | OUTPATIENT
Start: 2024-02-05 | End: 2024-04-05

## 2024-02-05 ASSESSMENT — PAIN SCALES - GENERAL
PAINLEVEL: MILD PAIN (2)
PAINLEVEL: NO PAIN (0)

## 2024-02-05 NOTE — PATIENT INSTRUCTIONS
Try the deep breathing for those episodes of chest pain.  Do the eye drops for the itching and redness.  Double the dose of the flonase and do it twice a day. DO THE NASAL RINSES BEFORE so that the medication can penetrate.

## 2024-02-05 NOTE — PATIENT INSTRUCTIONS
"Hi Jose Daniel,    Follow-up with RD in 1 month.     Thank you,    Montserrat Nazario, RD, LD  If you would like to schedule or reschedule an appointment with the RD, please call [phone]    Nutrition Goals  1) Follow 2300 calorie/day plan. Continue tracking in guilherme.   2) Consume 30 gm protein at each meal (breakfast and lunch) and add in a high-protein snack (Consider protein shake/bar for this snack)  3) Add in a serving of fruit or vegetable with at least one meal daily       Quick/Easy Protein Sources:  Hard boiled eggs  Part-skim cheese sticks  Baby Bell cheese rounds  Low-fat/low-sugar Greek yogurt  Low-fat cottage cheese  Lean deli meat (chicken/turkey/ham)  Roasted chickpeas or lentils  Nuts   Turkey meat stick  Protein shake/bar  \"P3\" snack (cheese, nuts, deli meat)  Aldi's \"Protein Bread\"   \"Egglife\" egg white wrap    Tuna/salmon can/packet     Protein Sources   [email or web address]       Meal Replacement Shake Options:   *Protein Shake Criteria: no more than 210 Calories, at least 20 grams of protein, and less than 10 grams of sugar   [facility] smoothie (160 Calories, 20 g protein)   Premier Protein (160 Calories, 30 g protein)  Slim Fast Advanced Nutrition (180 Calories, 20 g protein)  Muscle Milk, lactose-free, 17 oz bottle (210 Calories, 30 g protein)  Integrated Supplements, no artificial sugars (110 Calories, 20 g protein)  Boost/Ensure Max (160 calories, 30 gm protein)   Fairlife Protein Shakes (160-230 calories, 26-42 gm protein)  Aldi's Elevation Protein Powder (180 calories, 30 gm protein)   Orgain Protein Shakes (130-160 calories, 20-26 gm protein)     Meal Replacement Bar Options:  [facility] Protein Shake (160 Calories, 15 g protein)  Quest Protein Bars (190 Calories, 20 g protein)  Built Bar (170 Calories, 15-20 g protein)  One Protein Bar (210 calories, 20 g protein)  [place] Signature Protein Bar (Costco) (190 Calories, 21 g protein)  Pure Protein Bars (180 Calories, 21 g " protein)      At Home Exercise Resources  ACE Fitness Library - Exercises by Muscle Group  https://www.Widdle.org/resources/everyone/exercise-library/    Channels with Exercise Modifications:  Coach Geller (strengthening) https://www.Trudevube.com/@Howard Betancourt (strengthening): https://www.Trudevube.com/channel/QZAOX9-HQIPk62XL-a0XMnhS  Yoga with Zelinda: https://www.Trudevube.com/@yogawithzelinda    Size-Inclusive Fitness Routines:   Beginner Workout - Standing (low impact)  https://www.Trudevube.com/watch?v=1CsLMId0NFY      Yoga  https://www.Trudevube.com/watch?v=VdIX8auOH_M&t=36s  https://www.Trudevube.com/watch?v=zUnjJdJitPw    Pilates  https://www.Trudevube.com/watch?v=aHUbzuMZ3Jy    Full Body Strengthening:  https://www.Trudevube.com/watch?v=5NgUK1p-cm2   https://www.Trudevube.com/watch?v=V7c64lSwvcU    Other Fitness Channels:  Dance Workouts: The [employer]orio   https://www.Trailerpop.MediaLifTV/user/TheUNC Health    HIIT Workouts: PopSugar Fitness  https://www.Trudevube.com/user/popsugartvfit    Pilates: Blogilates  https://www.Trailerpop.MediaLifTV/user/blogilates    Yoga: Yoga with Suma   https://www.Trudevube.com/user/yogawithadriene/featured      Handouts Relating to Exercise :    1.     Learning About Being Physically Active     2.      Muscle Conditionin Exercises    3.     Resistance Training with Free Weights: 3 Exercises    4.      Resistance Training with Surgical Tubin Exercises    5.     Stretchin Exercises    6.     Seated Exercises for Arms and Legs: 11 Exercises      COMPREHENSIVE WEIGHT MANAGEMENT PROGRAM  VIRTUAL SUPPORT GROUPS    At [facility] our Comprehensive Weight Management program offers on-line support groups for patients who are working on weight loss and considering, preparing for, or have had weight loss surgery.     There is no cost for this opportunity.  You are invited to attend the?Virtual Support Groups?provided by any of the following locations:    [employer] via Bonuu! Loyalty  Teams with Karen Hendrix RN  2.   [place] via Prolify Teams with Bebeto Liu, PhD, LP  3.   [place] via Prolify Teams with Brianda Short RN  4.   [facility] via a Zoom Meeting with Brianda Miller [email or web address]    The following Support Group information can also be found on our website:  https://www.[facility].org/treatments/weight-loss-and-weight-loss-surgery-support-groups      [facility] Weight Loss Surgery Support Group  The support group is a patient-lead forum that meets monthly to share experiences, encouragement and education. It is open to those who have had weight loss surgery, are scheduled for surgery, or are considering surgery.   WHEN: This group meets on the 3rd Wednesday of each month from 5:00PM - 6:00PM virtually using Microsoft Teams.   FACILITATOR: Led by Karen Abdullahi RD, BASIL, RN, the program's Clinical Coordinator.   TO REGISTER: Please contact the clinic via Poke'n Call or call the nurse line directly at [phone] to inform our staff that you would like an invite sent to you and to let us know the email you would like the invite sent to. Prior to the meeting, a link with directions on how to join the meeting will be sent to you.    2023 and 2024 Meetings   December 20  January 17  February 21  March 20  April 17  May 15  Nayely 19      [facility] Bariatric Care Support Group?  This is open to all pre- and post- operative bariatric surgery patients as well as their support system.   WHEN: This group meets the 3rd Tuesday of each month from 6:30 PM - 8:00 PM virtually using Microsoft Teams.   FACILITATOR: Led by Bebeto Liu, Ph.D who is a Licensed Psychologist with the [facility] Comprehensive Weight Management Program.   TO REGISTER: Please send an email to Bebeto Liu, Ph.D., LP at?[email or web address]?if you would like an invitation to the group. Prior to the meeting, a link with  "directions on how to join the meeting will be sent to you.    2023 and 2024 Meetings  December 19 January 16: \"Medication Management and Bariatric Surgery\", Ramya Chaparro, PharmD, Pharmacy Resident at [facility]  February 20: \"A Bariatric Surgery Patient's Perspective\", WILI Farrar, [facility], Behavioral Health Clinician at [facility]  March 19  April 16  May 21  Nayely 18: \"Nutritional Labeling\", Dietitian speaker to be announced.  November 19: \"Holiday Eating\", Dietitian speaker to be announced.    [facility] Post-Operative Bariatric Surgery Support Group  This is a support group for [facility] bariatric patients (and those external to [facility]) who have had bariatric surgery and are at least 3 months post-surgery.  WHEN: This support group meets the 4th Wednesday of the month from 11:00 AM - 12:00 PM virtually using Microsoft Teams.   FACILITATOR: Led by Certified Bariatric Nurse, Brianda Short RN.   TO REGISTER: Please send an email to Brianda at [email or web address] if you would like an invitation to the group.  Prior to the meeting, a link with directions on how to join the meeting will be sent to you.    2023 and 2024 Meetings  December 27  January 24  February 28  March 27  April 24  May 22  Nayely 26    [facility] Healthy Lifestyle Group?  This is a 60 minute virtual coaching group for those who want to lead a healthier lifestyle. Come together to set goals and overcome barriers in a supportive group environment. We will address the four pillars of health: nutrition, exercise, sleep and emotional well-being.  This group is highly recommended for those who are participating in the 24 week Healthy Lifestyle Plan and our Health Coaching sessions.  WHEN: This group meets the 1st Friday of the month, 12:30 PM - 1:30 PM online, via a zoom meeting.  " "  FACILITATOR: Led by National Board Certified Health and , Brianda Miller, [facility].   TO REGISTER: Please call the Call Center at [phone] to register.  You will get an appointment to attend in [facility]. Fifteen minutes prior to the meeting, complete the e-check in and you will get the link to join the meeting.    There is no charge to attend this group and space is limited.     2023 and 2024 Meetings  December 1: \"Let's Talk\" (guided discussion on our wins and challenges)  January 5: \"New Years Vision: Manifest your Best 2024!\" (guided imagery,  journaling and discussion)  February 2: \"Let's Talk\"  March 1: \"10 Percent Happier\" by Juan Jose Munoz (Book Bites - a guided discussion on the nuggets of wisdom from favorite wellness books, no need to read the book but highly encouraged)  April 5: \"Let's Talk\"  May 3: \"Essentialism: The Disciplined Pursuit of Less\" by Glen Alex (Book Bites discussion)  June 7: \"Let's Talk\"  July 5: NO MEETING, off for the 4th of July Holiday  August 2: \"The Blue Zones, Secrets for Living a Longer Life\" by Juan Jose Hopkins (Book Bites discussion)                    "

## 2024-02-05 NOTE — LETTER
"2/5/2024       RE: Quan Davidson  [address]  [place] [address]     Dear Colleague,    Thank you for referring your patient, Quan Davidson, to the [facility] [place] at [facility]. Please see a copy of my visit note below.    Video-Visit Details    Type of service:  Video Visit    Video Start Time: 9:27 AM   Video End Time: 9:47 AM    Originating Location (pt. Location): Home    Distant Location (provider location): Offsite (providers home) [facility] [place]     Platform used for Video Visit: AmWell        Weight Management Nutrition Consultation    Quan \"Danie\" FLORA Davidson is a 18 year old male presents today for return weight management nutrition consultation.  Patient referred by Nirmala Torre PA-C on October 19, 2023.    Patient with Co-morbidities of obesity including:      10/12/2023     9:22 AM   --   I have the following health issues associated with obesity Pre-Diabetes    High Blood Pressure   I have the following symptoms associated with obesity Knee Pain    Back Pain    Fatigue    Hip Pain   Only has one kidney.     Anthropometrics:  Estimated body mass index is 54.92 kg/m  as calculated from the following:    Height as of 10/17/23: 1.613 m (5' 3.5\").    Weight as of 10/17/23: 142.9 kg (315 lb).    Current:  Estimated body mass index is 51.75 kg/m  as calculated from the following:    Height as of this encounter: 1.651 m (5' 5\").    Weight as of this encounter: 141.1 kg (311 lb). (-5 lbs in past 3 weeks)     Has an in-clinic visit with primary today and can confirm weight with home scale.     Medications for Weight Loss:  Naltrexone  Saxenda (may be transitioning to Wegovy)    NUTRITION HISTORY  Food allergies: None  Food intolerances: seafood  Vitamin/Mineral Supplements: None   Previous methods of diet modification for weight loss: Was 260lbs in high " "school. Was able to lose from 260lbs to 214lb wyatt year through going to the gym daily and low calorie diet. Was not sustainble through the pandemic, and regain weight. More recently had followed 36 hr fast followed by 12 h eating window, consistently for a month. Lost ~10-15 lbs while following. 2.5 years ago, stopped drinking all calorie-containing beverages. Also cut out eating chips and most desserts.   Tracked intake in guilherme called \"Nutricheck.\" Consuming 5394-8644 cole/day, protein:  gm, carb 192-220 gm.       1/12/24 - Injured shoulder which had significantly reduced his physical activity, however is feeling better now. He reports eating 3 meals daily, much smaller portions. No longer following multiple day fasts.       Today - has tracked nutritional intake for past 25 days. He is not sure if home scale is accurate, but feels like he is losing weight.   Protein intake ranges from 50-70 gm protein daily, some days is only getting 15-20 gm protein. Has been skipping breakfast, otherwise reports he would be over calorie goal.     Recent Diet Recall:  Breakfast: skip   Lunch: 12-1 pm jalapeno cheddar hot dog, bun, david and side of chips - 1400 cole    Dinner: 8 pm cereal and milk - 370 cole     Recent Diet Recall:  Breakfast: skip   Lunch: 12-1 pm jalapeno cheddar hot dog, bun, david and side of chips - 1400 cole    Dinner: 8 pm cereal and milk - 370 cole   Snack: cupcake    Recent Diet Recall:  Breakfast: skip   Lunch: McDonalds Quarters pounder, 10 pc chicken nuggets and ranch - 1300 cole  Dinner: 600 cole meal (ground beef/chicken with tinga)    Snacks: 2 sugar-cookies, serv og spicy nachos and mini doughnuts       Progress Towards Previous Goals:  1) Follow 2300 calorie/day plan. Consider tracking in an guilherme like Lose It guilherme. - Met, has been under 2300 cole/day for most days. Notes only 5 days where he was over.   2) At least 80 gm protein daily, at most 150 gm/day. - Not met  3) Limit carb to 60-75 gm per " meal, and less than 15 gm per snack. - Improving      Physical Activity:  Pt describes as light. 3500 steps/day. Doing 100 push-up, sit-ups and curls.         10/12/2023     9:22 AM   Activity/Exercise History   How much of a typical 12 hour day do you spend sitting? Most of the Day   How much of a typical 12 hour day do you spend lying down? Less Than Half the Day   How much of a typical day do you spend walking/standing? Half the Day   How many hours (not including work) do you spend on the TV/Video Games/Computer/Tablet/Phone? 6 Hours or More   How many times a week are you active for the purpose of exercise? 6-7 Times a Week   What keeps you from being more active? Unsure What To Do    Other   How many total minutes do you spend doing some activity for the purpose of exercising when you exercise? 15-30 Minutes       Nutrition Prescription  Recommended energy/nutrient modification.  2300 calories/day    Nutrition Diagnosis  Obesity r/t long history of positive energy balance aeb BMI >30. - Ongoing    Nutrition Intervention  Materials/education provided on hypocaloric diet for weight loss. Reviewed progress towards previous goals. Praised pt on adapting regular daily eating pattern, focusing on smaller portions and tracking nutritional intake.   Encouraged increased lean protein and fruit/vegetable intake.   Patient demonstrates understanding.  Co-developed goals to work towards.   Provided pt with list of goals and resources below via Pikhub.     Expected Engagement: good  Follow-Up Plans:      Nutrition Goals  1) Follow 2300 calorie/day plan. Continue tracking in guilherme.   2) Consume 30 gm protein at each meal (breakfast and lunch) and add in a high-protein snack (Consider protein shake/bar for this snack)  3) Add in a serving of fruit or vegetable with at least one meal daily       Quick/Easy Protein Sources:  Hard boiled eggs  Part-skim cheese sticks  Baby Bell cheese rounds  Low-fat/low-sugar Greek  "yogurt  Low-fat cottage cheese  Lean deli meat (chicken/turkey/ham)  Roasted chickpeas or lentils  Nuts   Turkey meat stick  Protein shake/bar  \"P3\" snack (cheese, nuts, deli meat)  Aldi's \"Protein Bread\"   \"Egglife\" egg white wrap    Tuna/salmon can/packet     Protein Sources   http://[email or web address] - Sistema de InformaÃ§Ã£o do Investidor/[email or web address].pdf       Meal Replacement Shake Options:   *Protein Shake Criteria: no more than 210 Calories, at least 20 grams of protein, and less than 10 grams of sugar   [facility] smoothie (160 Calories, 20 g protein)   Premier Protein (160 Calories, 30 g protein)  Slim Fast Advanced Nutrition (180 Calories, 20 g protein)  Muscle Milk, lactose-free, 17 oz bottle (210 Calories, 30 g protein)  Integrated Supplements, no artificial sugars (110 Calories, 20 g protein)  Boost/Ensure Max (160 calories, 30 gm protein)   Fairlife Protein Shakes (160-230 calories, 26-42 gm protein)  Aldi's Elevation Protein Powder (180 calories, 30 gm protein)   Orgain Protein Shakes (130-160 calories, 20-26 gm protein)     Meal Replacement Bar Options:  [facility] Protein Shake (160 Calories, 15 g protein)  Quest Protein Bars (190 Calories, 20 g protein)  Built Bar (170 Calories, 15-20 g protein)  One Protein Bar (210 calories, 20 g protein)  [place] Signature Protein Bar ([employer]co) (190 Calories, 21 g protein)  Pure Protein Bars (180 Calories, 21 g protein)      At Home Exercise Resources  ACE Fitness Library - Exercises by Muscle Group  https://www.[employer].org/resources/everyone/exercise-library/    Channels with Exercise Modifications:  Coach Geller (strengthening) https://www.youMacrotherapyube.com/@Howard  HASfit (strengthening): https://www.youMacrotherapyube.com/channel/GOHEE9-WTWAp77BM-b9VEprB  Yoga with Zelinda: https://www.I-lightingube.com/@yogawithzelinda    Size-Inclusive Fitness Routines:   Beginner Workout - Standing (low impact)  https://www.youtube.com/watch?v=7MdVDDp6YTV  "     Yoga  https://www.CriticalArc Ptyube.com/watch?v=VdIX8auOH_M&t=36s  https://www.CriticalArc Ptyube.com/watch?v=zUnjJdJitPw    Pilates  https://www.CriticalArc Ptyube.com/watch?v=tZXorlPO2Ys    Full Body Strengthening:  https://www.CriticalArc Ptyube.com/watch?v=7TvMB2q-cw3   https://www.CriticalArc Ptyube.com/watch?v=S5j36oYgxeB    Other Fitness Channels:  Dance Workouts: [employer]   https://www.SEE Forge.WizRocket Technologies/user/TheFitnessMarall    HIIT Workouts: CupomNow  https://www.SEE Forge.WizRocket Technologies/user/popsuMobile Labstvfit    Pilates: Blogilates  https://www.SEE Forge.WizRocket Technologies/user/blogilates    Yoga: Yoga with Suma   https://www.SEE Forge.WizRocket Technologies/user/yogawithadriene/featured      Handouts Relating to Exercise :    1.     Learning About Being Physically Active     2.      Muscle Conditionin Exercises    3.     Resistance Training with Free Weights: 3 Exercises    4.      Resistance Training with Surgical Tubin Exercises    5.     Stretchin Exercises    6.     Seated Exercises for Arms and Legs: 11 Exercises      Follow-Up:  1-2 months, PRN    Time spent with patient: 20 minutes.  Montserrat Nazario RD, LD

## 2024-02-05 NOTE — PROGRESS NOTES
Assessment & Plan     Nasal congestion  Chronic, uncontrolled.   Possible nasal polyp visualized on physical exam that could be contributing. Pt already has ENT appointment scheduled for April. Educated importance of nasal spray and how to use it. Encouraged consistent use of neti pot followed by flonase BID prior to ENT appointment.     Chest pain, unspecified type  Acute, isolated episodes. Unclear etiology. GERD vs.asthma vs. Stress induced.   Self resolving chest pain episodes and normal stress echo test two weeks ago is reassuring. Discussed keeping a symptom log to provide better insight to episodes; what he eats that day, how soon he lays down after eating, timing of food prior to bed, and any other symptoms he has. Continue use of famotidine, appreciate ENT opinion. Provided patient with deep breathing exercises to try during the episodes.     Contact dermatitis of eyelid, unspecified laterality  Chronic, controlled. Skin around eyes looks much improved today. Will try drops to help relive itchiness and watery eyes.   - olopatadine (PATANOL) 0.1 % ophthalmic solution  Dispense: 5 mL; Refill: 1    Shortness of breath  Chronic, stabilized with asthma management.   - albuterol (PROAIR HFA/PROVENTIL HFA/VENTOLIN HFA) 108 (90 Base) MCG/ACT inhaler  Dispense: 18 g; Refill: 0    Mild intermittent asthma with acute exacerbation  Chronic, well-controlled. Continue daily symbicort and albuterol PRN.  - budesonide-formoterol (SYMBICORT) 160-4.5 MCG/ACT Inhaler  Dispense: 10.2 g; Refill: 3    Class 3 severe obesity with serious comorbidity and body mass index (BMI) of 50.0 to 59.9 in adult, unspecified obesity type (H)  Chronic, stable. Following with weight management. Concerns regarding medication supply. Recommend semaglutide given higher efficacy, but did write for liraglutide in case of shortage.   - naltrexone (DEPADE/REVIA) 50 MG tablet  Dispense: 30 tablet; Refill: 3  - liraglutide - Weight Management  "(SAXENDA) 18 MG/3ML pen  Dispense: 45 mL; Refill: 0    Voice hoarseness  Chronic, partially controlled. Continue famotidine, voice rest during periods of increased mucous.   - famotidine (PEPCID) 40 MG tablet  Dispense: 90 tablet; Refill: 1    Prescription drug management  I spent a total of 23 minutes on the day of the visit.   Time spent by me doing chart review, history and exam, documentation and further activities per the note      BMI  Estimated body mass index is 51.55 kg/m  as calculated from the following:    Height as of this encounter: 1.651 m (5' 5\").    Weight as of this encounter: 140.5 kg (309 lb 12.8 oz).   Weight management plan: Patient referred to endocrine and/or weight management specialty Discussed healthy diet and exercise guidelines      CONSULTATION/REFERRAL to ENT, allergy. Appointments already made   FUTURE APPOINTMENTS:       - Follow-up visit in 3 months     Subjective   Jose Daniel is a 18 year old, presenting for the following health issues:  Shoulder Pain (Pt states that the shoulder pain seems to be way better as of recently. ), Eye Problem, Chest Pain, and Mucus (Pt states that he has been coughing up yellow mucus - after a few times of coughing it up, it will turn into \"clear\" mucus. )    Shoulder is improved with PT    Has had 2 occurrences of very sharp pain in his chest. Lasts 5-10 seconds. After it goes away it leaves a ringing pain.   Has occurred at 11 PM both times.   Pain is mid-sternal  Pain does not radiate anywhere   Nothing makes the pain worse  Difficult to breath during the episode  No lightheadedness   Hx of chest heaviness this fall, had ECHO, EKG, and stress test completed- all were normal  Localized in clusters and sharp. Accompanied with feeling horrible. Always laying down. 5-10 second and self resolving, also transforms into a less intense \"ringing\".    Watery eye  Has been going on for awhile. Patient's eye problems ongoing for the last several months but not since " "prior.   Mostly left eye, sometimes right eye  Constantly draining from outer eye  Itchy eye   Hx of atopic dermatitis near eye.   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      2/5/2024    10:06 AM   Additional Questions   Roomed by Maninder CARBALLO   Accompanied by N/A     History of Present Illness       Reason for visit:  Constant eye watering, mild shoulder pain, new chest pain on the 30th of January  Symptom onset:  1-2 weeks ago  Symptoms include:  Eye watering, shoulder pain  Symptom intensity:  Mild  Symptom progression:  Staying the same  Had these symptoms before:  No  What makes it worse:  Stretching  What makes it better:  Icing    He eats 0-1 servings of fruits and vegetables daily.He consumes 0 sweetened beverage(s) daily.He exercises with enough effort to increase his heart rate 9 or less minutes per day.  He exercises with enough effort to increase his heart rate 3 or less days per week.   He is taking medications regularly.       Review of Systems  Constitutional, HEENT, cardiovascular, pulmonary, gi and gu systems are negative, except as otherwise noted.      Objective    BP (!) 140/90 (BP Location: Right arm, Patient Position: Sitting, Cuff Size: Adult Large)   Pulse 78   Temp 98.7  F (37.1  C) (Oral)   Resp 22   Ht 1.651 m (5' 5\")   Wt 140.5 kg (309 lb 12.8 oz)   SpO2 97%   BMI 51.55 kg/m    Body mass index is 51.55 kg/m .  Physical Exam   GENERAL: alert and no distress  EYES: Eyes grossly normal to inspection, PERRL and conjunctivae and sclerae normal, skin darkening around eyes noted bilaterally  HENT: normal cephalic/atraumatic, ear canals and TM's normal, nasal mucosa " edematous, nasal polyp present in right nare, oropharynx clear, and oral mucous membranes moist  NECK: no adenopathy, no asymmetry, masses, or scars  RESP: lungs clear to auscultation - no rales, rhonchi or wheezes  CV: regular rate and rhythm, normal S1 S2, no S3 or S4, no murmur, click or rub, no peripheral edema  ABDOMEN: soft, nontender, no hepatosplenomegaly, no masses and bowel sounds normal  MS: no gross musculoskeletal defects noted, no edema          Amanda Marti DNP student     ----- Services Performed by a STUDENT in Presence of ATTENDING Physician-------     Signed Electronically by: NACHO AGUILAR DO

## 2024-02-05 NOTE — NURSING NOTE
Is the patient currently in the state of [place]? YES    Visit mode:VIDEO    If the visit is dropped, the patient can be reconnected by: VIDEO VISIT: Text to cell phone:   Telephone Information:   Mobile [phone]       Will anyone else be joining the visit? NO  (If patient encounters technical issues they should call [phone]    How would you like to obtain your AVS? MyChart    Are changes needed to the allergy or medication list? N/A    Reason for visit: RECHECK ([facility])    Ligia VEGA

## 2024-02-14 ENCOUNTER — PATIENT OUTREACH (OUTPATIENT)
Dept: CARE COORDINATION | Facility: CLINIC | Age: 19
End: 2024-02-14
Payer: COMMERCIAL

## 2024-02-14 NOTE — PROGRESS NOTES
Clinic Care Coordination Contact  Community Health Worker Follow Up    Care Gaps:     Health Maintenance Due   Topic Date Due    ASTHMA ACTION PLAN  Never done    HEPATITIS B IMMUNIZATION (1 of 3 - 3-dose series) Never done    COVID-19 Vaccine (1) Never done    DTAP/TDAP/TD IMMUNIZATION (2 - Td or Tdap) 10/24/2023    HPV IMMUNIZATION (2 - Male 3-dose series) 10/24/2023       Care Gaps Last addressed on 2/14/2024    Care Plan:   Care Plan: General       Problem: HP GENERAL PROBLEM       Long-Range Goal: I would like to work with the [facility]ariat[facility] Clinic to assist me with weight loss.       Start Date: 6/28/2023 Expected End Date: 2/28/2024    This Visit's Progress: 60% Recent Progress: 50%    Priority: High    Note:     Barriers: elevated BMI  Strengths: strong advocate for himself.  Patient expressed understanding of goal: yes  Action steps to achieve this goal:  1. I understand Dr. Louis has sent  referral to the [facility]ariatric Clinic on my behalf.   2. I understand a representative from the Bariatric Clinic will contact me directly to scheduled an intake appointment.   3. I will report progress towards this goal at scheduled outreach telephone calls from my Care Coordination team.      Discussed with sister 3/7 next appt  Discussed on 1/18/24 11/22 discussed, next PCP appt 11/28, next weight mgmt visit 1/12 and 1/16/24  Discussed - completed virtual visit  Discussed on 8/2/23                              Intervention and Education during outreach: CHW scheduled follow up call with CC RN and patient's sister Ana Luisa- she is seeking assistance with another family member- scheduled call for 2/16/24 @ 2pm    CHW spoke to Jose Daniel's sister Ana Luisa today for monthly CC outreach call as patient was not home at time of CHW call today, Ana Luisa states Jose Daniel is doing well and is attending all scheduled appointments.  CHW discussed CC goal- next weight mgmt visit scheduled for 3/7/2024    CHW Plan: Continue with monthly outreach call to discuss,  support and update CC goal progression    Next CHW outreach call: 3/15/2024    Siri EVANS  Community Health Worker  [facility] Care Coordination  [facility], [email or web address]  Office: [phone]

## 2024-02-16 ENCOUNTER — PATIENT OUTREACH (OUTPATIENT)
Dept: NURSING | Facility: CLINIC | Age: 19
End: 2024-02-16
Payer: COMMERCIAL

## 2024-02-16 NOTE — PROGRESS NOTES
"Clinic Care Coordination Contact  Follow Up Progress Note      Assessment: CCC RN spoke with patient today to follow up on goal and to discuss and needs or concerns. Patient stated he was doing \"really good\". He stated he is down to 305 lbs. Patient stated he feels like he clothes are fitting better and his monitoring his portion sizes at meals. Patient said he continues to work with the Bariatric Clinic for assistance with his weight loss goal. He denied any other needs ro concerns at this time.     Care Gaps:    Health Maintenance Due   Topic Date Due    ASTHMA ACTION PLAN  Never done    HEPATITIS B IMMUNIZATION (1 of 3 - 3-dose series) Never done    COVID-19 Vaccine (1) Never done    DTAP/TDAP/TD IMMUNIZATION (2 - Td or Tdap) 10/24/2023    HPV IMMUNIZATION (2 - Male 3-dose series) 10/24/2023       Scheduled with PCP on 4/5/24      Care Plans  Care Plan: General       Problem: HP GENERAL PROBLEM       Long-Range Goal: I would like to work with the Bariatric Clinic to assist me with weight loss.       Start Date: 6/28/2023 Expected End Date: 2/28/2024    Recent Progress: 60%    Priority: High    Note:     Barriers: elevated BMI  Strengths: strong advocate for himself.  Patient expressed understanding of goal: yes  Action steps to achieve this goal:  1. I understand Dr. Louis has sent  referral to the [facility]ariatric Clinic on my behalf.   2. I understand a representative from the Bariatric Clinic will contact me directly to scheduled an intake appointment.   3. I will report progress towards this goal at scheduled outreach telephone calls from my Care Coordination team.      Discussed on 2/16/24  Discussed with sister 3/7 next appt  Discussed on 1/18/24 11/22 discussed, next PCP appt 11/28, next weight mgmt visit 1/12 and 1/16/24  Discussed - completed virtual visit  Discussed on 8/2/23                              Intervention/Education provided during outreach: Discussed the importance of taking his medications as " directed. Encouraged him to attend all upcoming appointments. Encouraged him to contact Care Coordination for any additional needs or concerns.      Outreach Frequency: monthly, more frequently as needed        Plan: CCC RN will continue to monitor, support patient with current goal and will be available to assist as nursing needs arise.     Care Coordinator will perform Chart Review in 45 days.

## 2024-02-27 NOTE — PROGRESS NOTES
CHIEF COMPLAINT: Patient presents with:  Consult: Wakes up very congested for past few years, Nasal sprays and rinses have given no relief, a lot of mucus and phlegm, constant globus sensation, Allergy testing complete in January, GERD starting taking famotidine about 3 months ago has noticed some relief, RSI 21         HISTORY OF PRESENT ILLNESS:      Jose Daniel was seen at the behest of [facility] for voice concern.    Reflux Symptom Index    Within the last MONTH, how did the following problems affect you?  (0 = no problem, 5 = severe problem)    Hoarseness or a problem with your voice: 3  Clearing your throat: 5  Excess throat mucous or postnasal drip: 5  Difficulty swallowing food, liquids, or pills: 1  Coughing after you ate or after lying down: 0  Breathing difficulties or choking episodes: 0  Troublesome or annoying cough: 0  Sensations of something sticking in your throat or a lump in your throat: 5  Heartburn, chest pain, indigestion, or stomach acid coming up: 2    Total: 21     Referral note:    Voice hoarseness  Referral placed again to [facility].   - Adult ENT  Referral     REVIEW OF SYSTEMS    Review of Systems as per HPI and PMHx, otherwise 10 system review system are negative.       ALLERGIES    Ibuprofen    CURRENT MEDICATIONS      Current Outpatient Medications:     budesonide-formoterol (SYMBICORT) 160-4.5 MCG/ACT Inhaler, Inhale 2 puffs into the lungs 2 times daily, Disp: 10.2 g, Rfl: 3    famotidine (PEPCID) 40 MG tablet, Take 1 tablet (40 mg) by mouth 2 times daily, Disp: 90 tablet, Rfl: 1    metroNIDAZOLE (METROCREAM) 0.75 % external cream, APPLY TOPICALLY TWICE DAILY, Disp: 45 g, Rfl: 1    naltrexone (DEPADE/REVIA) 50 MG tablet, Take 1/2 tablet once daily 1-2 hours prior to worst cravings for 1 week, then increase to 1 tablet daily as directed if tolerating, Disp: 30 tablet, Rfl: 3    olopatadine (PATANOL) 0.1 % ophthalmic solution, Place 1 drop into both eyes 2 times daily, Disp:  5 mL, Rfl: 1    pimecrolimus (ELIDEL) 1 % external cream, Apply topically 2 times daily Please provide Elidel instead of Protopic, because Protopic was burning too much (Patient not taking: Reported on 2024), Disp: 30 g, Rfl: 3    Semaglutide-Weight Management (WEGOVY) 1.7 MG/0.75ML pen, Inject 1.7 mg Subcutaneous once a week, Disp: 3 mL, Rfl: 3     PAST MEDICAL HISTORY    PAST MEDICAL HISTORY:   Past Medical History:   Diagnosis Date    Glaucoma (increased eye pressure)     Term birth of male      Traumatic brain injury (H) 2014    UPJ (ureteropelvic junction) obstruction 03/10/2015    UPJ obstruction, congenital 2014    identified on imaging for unrelated trauma       PAST SURGICAL HISTORY    PAST SURGICAL HISTORY:   Past Surgical History:   Procedure Laterality Date    NO HISTORY OF SURGERY         FAMILY  HISTORY    FAMILY HISTORY:   Family History   Problem Relation Age of Onset    Diabetes Father     Hypertension Father        SOCIAL HISTORY    SOCIAL HISTORY:   Social History     Tobacco Use    Smoking status: Never     Passive exposure: Past    Smokeless tobacco: Never   Substance Use Topics    Alcohol use: Not on file        PHYSICAL EXAM    HEAD: Normal appearance and symmetry:  No cutaneous lesions.      NECK:  supple     EARS:    Right:   TM intact   LEFT:   TM intact    EYES:  EOMI    CN VII/XII:  intact     NOSE:     Dorsum:   straight  Septum:  midline  Mucosa:  moist        ORAL CAVITY/OROPHARYNX:     Lips:  Normal.  Tongue: normal, midline  Mucosa:   no lesions     NECK:  Trachea:  midline.              Thyroid:  normal              Adenopathy:  none        NEURO:   Alert and Oriented     GAIT AND STATION:  normal     RESPIRATORY:   Symmetry and Respiratory effort     PSYCH:  Normal mood and affect     SKIN:   warm and dry         FLEX LARYNGOSCOPY:    After obtaining consent, a flexible laryngoscope is used to examine both nasal cavities, the nasopharynx, pharnx, and larynx.       Nasal cavity: wnl  NP: wnl  Pharnx:cobblestoning  Larynx: TVCs edematous, sharp  Secretions: copious, clear      IMPRESSION:    Encounter Diagnoses   Name Primary?    Voice hoarseness     LPRD (laryngopharyngeal reflux disease) Yes          RECOMMENDATIONS:    Orders Placed This Encounter   Procedures    LARYNGOSCOPY FLEX FIBEROPTIC, DIAGNOSTIC    XR Esophagram    CT Sinus w/o Contrast    Adult General Surg Referral      Orders Placed This Encounter   Medications    pantoprazole (PROTONIX) 40 MG EC tablet     Sig: Take 1 tablet (40 mg) by mouth daily for 90 days     Dispense:  90 tablet     Refill:  0    azelastine (ASTELIN) 0.1 % nasal spray     Si sprays each nostril 1-2x daily as needed for nasal congestion (use nightly for first 2 week)     Dispense:  30 mL     Refill:  11        Return visit 6 months to discuss BA assisted septoplasty after CT review.

## 2024-02-28 ENCOUNTER — OFFICE VISIT (OUTPATIENT)
Dept: OTOLARYNGOLOGY | Facility: CLINIC | Age: 19
End: 2024-02-28
Attending: FAMILY MEDICINE
Payer: COMMERCIAL

## 2024-02-28 DIAGNOSIS — R09.81 CONGESTION OF PARANASAL SINUS: ICD-10-CM

## 2024-02-28 DIAGNOSIS — K21.9 GASTROESOPHAGEAL REFLUX DISEASE WITHOUT ESOPHAGITIS: ICD-10-CM

## 2024-02-28 DIAGNOSIS — K21.9 LPRD (LARYNGOPHARYNGEAL REFLUX DISEASE): Primary | ICD-10-CM

## 2024-02-28 DIAGNOSIS — R49.0 VOICE HOARSENESS: ICD-10-CM

## 2024-02-28 PROCEDURE — 99204 OFFICE O/P NEW MOD 45 MIN: CPT | Mod: 25 | Performed by: OTOLARYNGOLOGY

## 2024-02-28 PROCEDURE — 31575 DIAGNOSTIC LARYNGOSCOPY: CPT | Performed by: OTOLARYNGOLOGY

## 2024-02-28 RX ORDER — AZELASTINE 1 MG/ML
SPRAY, METERED NASAL
Qty: 30 ML | Refills: 11 | Status: SHIPPED | OUTPATIENT
Start: 2024-02-28

## 2024-02-28 RX ORDER — PANTOPRAZOLE SODIUM 40 MG/1
40 TABLET, DELAYED RELEASE ORAL DAILY
Qty: 90 TABLET | Refills: 0 | Status: SHIPPED | OUTPATIENT
Start: 2024-02-28 | End: 2024-04-02

## 2024-02-28 NOTE — PATIENT INSTRUCTIONS
Acid Suppression Treatment    Start protonix as directed, 30 minutes before breakfast  You will follow up with Dr. Aldair Steiner, a reflux specialist.       Lifestyle changes:    Avoid eating 2-3 hours before bedtime.   You may find it helpful to elevate the head of your bed.     Avoid following foods that are likely to trigger acid reflux:    Coffee or tea (try LOW ACID coffee or herbal tea)  Anything that s fizzy or has caffeine in it  Alcohol   Citrus fruits, such as oranges and magdiel  Tomato based foods (salsa, pizza, lasanga)  Chocolate   Mint or peppermint  Fatty foods (ice cream)  Spicy foods  Onions and garlic      Try alkaline water (ph 9.5) instead of regular water    For Nasal Congestion:    Astelin nasal spray as directed  CT sinus    Return visit 4-6 months to discuss Balloon Assisted Septoplasty

## 2024-02-28 NOTE — LETTER
2/28/2024         RE: Quan Davidson  [address]  [place] [address]        Dear Colleague,    Thank you for referring your patient, Quan Davidson, to the [facility]. Please see a copy of my visit note below.    CHIEF COMPLAINT: Patient presents with:  Consult: Wakes up very congested for past few years, Nasal sprays and rinses have given no relief, a lot of mucus and phlegm, constant globus sensation, Allergy testing complete in January, GERD starting taking famotidine about 3 months ago has noticed some relief, RSI 21         HISTORY OF PRESENT ILLNESS:      Jose Daniel was seen at the behest of [facility] for voice concern.    Reflux Symptom Index    Within the last MONTH, how did the following problems affect you?  (0 = no problem, 5 = severe problem)    Hoarseness or a problem with your voice: 3  Clearing your throat: 5  Excess throat mucous or postnasal drip: 5  Difficulty swallowing food, liquids, or pills: 1  Coughing after you ate or after lying down: 0  Breathing difficulties or choking episodes: 0  Troublesome or annoying cough: 0  Sensations of something sticking in your throat or a lump in your throat: 5  Heartburn, chest pain, indigestion, or stomach acid coming up: 2    Total: 21     Referral note:    Voice hoarseness  Referral placed again to [facility].   - Adult ENT  Referral     REVIEW OF SYSTEMS    Review of Systems as per HPI and PMHx, otherwise 10 system review system are negative.       ALLERGIES    Ibuprofen    CURRENT MEDICATIONS      Current Outpatient Medications:      budesonide-formoterol (SYMBICORT) 160-4.5 MCG/ACT Inhaler, Inhale 2 puffs into the lungs 2 times daily, Disp: 10.2 g, Rfl: 3     famotidine (PEPCID) 40 MG tablet, Take 1 tablet (40 mg) by mouth 2 times daily, Disp: 90 tablet, Rfl: 1     metroNIDAZOLE (METROCREAM) 0.75 % external cream, APPLY TOPICALLY TWICE DAILY, Disp: 45 g, Rfl: 1     naltrexone (DEPADE/REVIA) 50  MG tablet, Take 1/2 tablet once daily 1-2 hours prior to worst cravings for 1 week, then increase to 1 tablet daily as directed if tolerating, Disp: 30 tablet, Rfl: 3     olopatadine (PATANOL) 0.1 % ophthalmic solution, Place 1 drop into both eyes 2 times daily, Disp: 5 mL, Rfl: 1     pimecrolimus (ELIDEL) 1 % external cream, Apply topically 2 times daily Please provide Elidel instead of Protopic, because Protopic was burning too much (Patient not taking: Reported on 2024), Disp: 30 g, Rfl: 3     Semaglutide-Weight Management (WEGOVY) 1.7 MG/0.75ML pen, Inject 1.7 mg Subcutaneous once a week, Disp: 3 mL, Rfl: 3     PAST MEDICAL HISTORY    PAST MEDICAL HISTORY:   Past Medical History:   Diagnosis Date     Glaucoma (increased eye pressure)      Term birth of male       Traumatic brain injury (H) 2014     UPJ (ureteropelvic junction) obstruction 03/10/2015     UPJ obstruction, congenital 2014    identified on imaging for unrelated trauma       PAST SURGICAL HISTORY    PAST SURGICAL HISTORY:   Past Surgical History:   Procedure Laterality Date     NO HISTORY OF SURGERY         FAMILY  HISTORY    FAMILY HISTORY:   Family History   Problem Relation Age of Onset     Diabetes Father      Hypertension Father        SOCIAL HISTORY    SOCIAL HISTORY:   Social History     Tobacco Use     Smoking status: Never     Passive exposure: Past     Smokeless tobacco: Never   Substance Use Topics     Alcohol use: Not on file        PHYSICAL EXAM    HEAD: Normal appearance and symmetry:  No cutaneous lesions.      NECK:  supple     EARS:    Right:   TM intact   LEFT:   TM intact    EYES:  EOMI    CN VII/XII:  intact     NOSE:     Dorsum:   straight  Septum:  midline  Mucosa:  moist        ORAL CAVITY/OROPHARYNX:     Lips:  Normal.  Tongue: normal, midline  Mucosa:   no lesions     NECK:  Trachea:  midline.              Thyroid:  normal              Adenopathy:  none        NEURO:   Alert and Oriented     GAIT AND  STATION:  normal     RESPIRATORY:   Symmetry and Respiratory effort     PSYCH:  Normal mood and affect     SKIN:   warm and dry         FLEX LARYNGOSCOPY:    After obtaining consent, a flexible laryngoscope is used to examine both nasal cavities, the nasopharynx, pharnx, and larynx.      Nasal cavity: wnl  NP: wnl  Pharnx:cobblestoning  Larynx: TVCs edematous, sharp  Secretions: copious, clear      IMPRESSION:    Encounter Diagnoses   Name Primary?     Voice hoarseness      LPRD (laryngopharyngeal reflux disease) Yes          RECOMMENDATIONS:      No orders of the defined types were placed in this encounter.     [unfilled]       Again, thank you for allowing me to participate in the care of your patient.        Sincerely,        Uche Islas MD

## 2024-02-28 NOTE — NURSING NOTE
Reflux Symptom Index    Within the last MONTH, how did the following problems affect you?  (0 = no problem, 5 = severe problem)    Hoarseness or a problem with your voice: 3  Clearing your throat: 5  Excess throat mucous or postnasal drip: 5  Difficulty swallowing food, liquids, or pills: 1  Coughing after you ate or after lying down: 0  Breathing difficulties or choking episodes: 0  Troublesome or annoying cough: 0  Sensations of something sticking in your throat or a lump in your throat: 5  Heartburn, chest pain, indigestion, or stomach acid coming up: 2    Total: 21

## 2024-03-01 DIAGNOSIS — Z90.5 S/P NEPHRECTOMY: Primary | ICD-10-CM

## 2024-03-01 DIAGNOSIS — R79.89 ELEVATED SERUM CREATININE: ICD-10-CM

## 2024-03-01 RX ORDER — ALBUTEROL SULFATE 0.83 MG/ML
2.5 SOLUTION RESPIRATORY (INHALATION) ONCE
Status: COMPLETED | OUTPATIENT
Start: 2024-03-04 | End: 2024-03-04

## 2024-03-04 ENCOUNTER — HOSPITAL ENCOUNTER (OUTPATIENT)
Dept: RESPIRATORY THERAPY | Facility: CLINIC | Age: 19
Discharge: HOME | End: 2024-03-04
Admitting: OTOLARYNGOLOGY
Payer: COMMERCIAL

## 2024-03-04 VITALS — OXYGEN SATURATION: 95 % | SYSTOLIC BLOOD PRESSURE: 143 MMHG | DIASTOLIC BLOOD PRESSURE: 74 MMHG

## 2024-03-04 DIAGNOSIS — R73.03 PRE-DIABETES: Primary | ICD-10-CM

## 2024-03-04 PROCEDURE — 999N000157 HC STATISTIC RCP TIME EA 10 MIN

## 2024-03-04 PROCEDURE — 94070 EVALUATION OF WHEEZING: CPT

## 2024-03-04 PROCEDURE — 94070 EVALUATION OF WHEEZING: CPT | Performed by: INTERNAL MEDICINE

## 2024-03-04 PROCEDURE — 95070 INHLJ BRNCL CHALLENGE TSTG: CPT

## 2024-03-04 PROCEDURE — 250N000009 HC RX 250

## 2024-03-04 RX ADMIN — ALBUTEROL SULFATE 2.5 MG: 2.5 SOLUTION RESPIRATORY (INHALATION) at 10:37

## 2024-03-04 NOTE — PROGRESS NOTES
RESPIRATORY CARE NOTE    Methacholine challenge testing completed by patient. Good patient effort and cooperation. PT did not notice any difference and did not cough during methacholine challenge.Albuterol 2.5 mg neb given for bronchodilation post methacholine. PT tolerated well after bronchodilation. PT vitals stable.Test will be scanned into Epic. The results of this test meet the ATS standards for acceptability and repeatability.  PT left in no distress.  Methacholine dosages administered:  0.0625mg  0.25mg  1mg  4mg  16mg    Zulma Weeks, RT  3/4/2024

## 2024-03-07 ENCOUNTER — VIRTUAL VISIT (OUTPATIENT)
Dept: ENDOCRINOLOGY | Facility: CLINIC | Age: 19
End: 2024-03-07
Payer: COMMERCIAL

## 2024-03-07 VITALS — WEIGHT: 300 LBS | HEIGHT: 64 IN | BODY MASS INDEX: 51.22 KG/M2

## 2024-03-07 DIAGNOSIS — Z71.3 NUTRITIONAL COUNSELING: ICD-10-CM

## 2024-03-07 DIAGNOSIS — E66.01 CLASS 3 SEVERE OBESITY WITH SERIOUS COMORBIDITY AND BODY MASS INDEX (BMI) OF 50.0 TO 59.9 IN ADULT, UNSPECIFIED OBESITY TYPE (H): Primary | ICD-10-CM

## 2024-03-07 DIAGNOSIS — E66.813 CLASS 3 SEVERE OBESITY WITH SERIOUS COMORBIDITY AND BODY MASS INDEX (BMI) OF 50.0 TO 59.9 IN ADULT, UNSPECIFIED OBESITY TYPE (H): Primary | ICD-10-CM

## 2024-03-07 PROCEDURE — 99207 PR NO CHARGE LOS: CPT | Mod: 95 | Performed by: DIETITIAN, REGISTERED

## 2024-03-07 PROCEDURE — 97803 MED NUTRITION INDIV SUBSEQ: CPT | Mod: 95 | Performed by: DIETITIAN, REGISTERED

## 2024-03-07 ASSESSMENT — PAIN SCALES - GENERAL: PAINLEVEL: NO PAIN (0)

## 2024-03-07 NOTE — PROGRESS NOTES
-    Nephrology Clinic Visit  Quan Davidson MRN: [id number] YOB: 2005  Primary Care Provider: Nidhi Little  ----------------------------------------------------------------------------------------------------------------------  Visit 3/8/2024:  -BP Control: 150/98 (in office). Home readings: His home readings vary. He reports usually getting readings in the 140's systolic and usually in the 80's diastolic.   --Current Regimen: None (previously on Lisinopril - this was about 4 years ago)  -DM Control: Fasting glucose acceptable. Unlikely to have DM  --Current Regimen: None  -Creatinine Trend: 0.94 (2023) from 0.9 (6/10/2023)  -Wegovy: No issues with diarrhea. Had a bit of nausea at the start.     Visit 2023:  --BP Control: 143/73 today in office.   --Current Regimen: None (previously on Lisinopril - this was about 4 years ago)  -DM Control: Fasting glucose acceptable. Unlikely to have DM  --Current Regimen: None  -Creatinine Trend: 0.94 (2023) from 0.9 (6/10/2023)  -Nocturia:0x  -Hematuria: No  -Nephrolithiasis: No  -NSAID Use: Tylenol, very rare.   -Herbal/OTC Medication Use: no  -Family History of Kidney Disease: No  -Family/Friends on RRT/Kidney Transplant: No/No    -Other:  --History of Nephrectomy: Procedure went smoothly.   --History of Hypertension: Was treated for about 2.5 years with Lisnoril. Came off about 4 years ago.;   --History of Obesity: Referred to medical weight management clinic.  Also doing intermittent fasting and this has helped. Has lost about 5lbs.   --Last saw Nephrology about 4 years ago.   --He had COVID when he lived in [place].   --He is currently doing [employer] ([employer]) classes online. Interested in [employer]    Objective:  PAST MEDICAL HISTORY:  Past Medical History:   Diagnosis Date    Glaucoma (increased eye pressure)     Term birth of male      Traumatic brain injury (H) 2014    UPJ (ureteropelvic junction) obstruction 03/10/2015    UPJ  "obstruction, congenital 09/01/2014    identified on imaging for unrelated trauma       PAST SURGICAL HISTORY:  Past Surgical History:   Procedure Laterality Date    NO HISTORY OF SURGERY         MEDICATIONS:  Current Outpatient Medications   Medication Instructions    desonide (DESOWEN) 0.05 % external ointment Topical, 2 TIMES DAILY    doxycycline monohydrate (ADOXA) 50 MG tablet 1 tablet, Oral, 2 TIMES DAILY (Diuretics and Nitrates)    tacrolimus (PROTOPIC) 0.1 % external ointment Topical, 2 TIMES DAILY       FAMILY MEDICAL HISTORY:   Family History   Problem Relation Age of Onset    Diabetes Father     Hypertension Father        PHYSICAL EXAM:   BP (!) 150/98   Pulse 102   Wt 134.9 kg (297 lb 8 oz)   SpO2 97%   BMI 51.04 kg/m    GENERAL APPEARANCE: alert and no distress  EYES: nonicteric  HENT: mouth without ulcers or lesions  RESP: lungs clear to auscultation   CV: regular rhythm, normal rate, no rub  ABDOMEN: soft, nontender, normal bowel sounds, no HSM   Extremities: No LE edema  MS: no evidence of inflammation in joints, no muscle tenderness  SKIN: no rash  NEURO: mentation intact and speech normal  PSYCH: affect normal    LABS REVIEWED BY ME:   ANEMIA  Recent Labs   Lab Test 02/02/24  1022 09/06/23  1346 06/10/23  1901   HGB 15.4 14.9 15.4       BMP  Recent Labs   Lab Test 09/06/23  1346 06/10/23  1901    142   POTASSIUM 3.9 4.6   CHLORIDE 104 105   CO2 25 27   ANIONGAP 11 10   BUN 9.3 13.4   CR 0.94 0.90   GFRESTIMATED >90 >90   PROTTOTAL  --  8.1       CBC  Recent Labs   Lab Test 02/02/24  1022 09/06/23  1346 06/10/23  1901   HGB 15.4 14.9 15.4   WBC  --  10.5 12.2*   HCT  --  43.9 45.5   MCV  --  80 83   PLT  --  419 434       DIABETES  Recent Labs   Lab Test 09/06/23  1346   A1C 6.0*       HYPONATREMIANo lab results found.    Invalid input(s): \"UOSM\", \"OSM\"    MBD  Recent Labs   Lab Test 09/06/23  1346 06/10/23  1901   JOSE ARMANDO 9.6 9.9   ALBUMIN 4.4 4.5   PHOS 2.6  --         URINE STUDIES  Recent " Labs   Lab Test 09/06/23  1350   COLOR Light Yellow   APPEARANCE Clear   URINEGLC 300*   URINEBILI Negative   URINEKETONE Negative   SG 1.023   UBLD Negative   URINEPH 5.5   PROTEIN 10*   NITRITE Negative   LEUKEST Negative   RBCU 1   WBCU 1     No lab results found.    ADDITIONAL LABS ORDERED/REVIEWED BY ME:  See below    Assessment/Plan  CKD Stage G1A1  Established care with me at [facility] 9/6/2023    History of UPJ Obstruction and s/p R Nephrectomy.(Due to poor function and pediatric HTN). Was previously on Lisinopril for about 2.5 years or so. Thinks he came off BP medications about 4 years prior to establishign care with me. His BP is elevated in office today. He does not have a home BP cuff.    Creatinine is stable at his baseline of 0.9. No hematuria or pyuria on his UA. UPCR is 0.11g/gr.     At follow up 3/8/2024  -Following with medical weight loss clinic and down 20lbs!  -BP up to the 150's. Home readings in the 140's typically. Starting Lisniopril 10mg qDay.     CKD Etiology (Biopsy Proven: No):  -Reduced Renal Mass (R Nephrectomy 2015  -Morbid Obesity  -Hypertensive Nephrosclerosis    Plan:  -Optimize BP Control: Start Lisinopril 10mg qDay. Update BMP in 2 weeks. Send me home readings 7-10 days after starting Lisinopril.   -Avoid NSAIDs  -Follow up with medical weight loss clinic. Discussed glomerular hyperfiltration injury and importance of getting BMI down. Discussed monitoring for GI symptoms with GLP1RA.     Other:  -Specialty Care Coordination Referral - CKD G1-G3b w/o imminent RRT planning/needs (Yes/no, Date Referral Placed): No  -Med Therapy Management Referral (Yes/No, Date of Referral): No    Return to clinic: 6 months    Enrrique Ibarra MD   of Medicine  Division of Nephrology and Hypertension  [facility]    20 minutes spent on the date of the encounter doing chart review, history and exam, documentation and further activities as noted above

## 2024-03-07 NOTE — LETTER
"3/7/2024       RE: Quan Davidson  [address]  [place] [address]     Dear Colleague,    Thank you for referring your patient, Quan Davidson, to the [facility] [place] [facility]. Please see a copy of my visit note below.    Video-Visit Details    Type of service:  Video Visit    Video Start Time: 11:59 AM   Video End Time: 12:10 PM    Originating Location (pt. Location): Home    Distant Location (provider location): Offsite (providers home) [facility] [place]     Platform used for Video Visit: [employer]        Weight Management Nutrition Consultation    Quan \"Danie\" FLORA Davidson is a 18 year old male presents today for return weight management nutrition consultation.  Patient referred by Nirmala Torre PA-C on October 19, 2023.    Patient with Co-morbidities of obesity including:      10/12/2023     9:22 AM   --   I have the following health issues associated with obesity Pre-Diabetes    High Blood Pressure   I have the following symptoms associated with obesity Knee Pain    Back Pain    Fatigue    Hip Pain   Only has one kidney.     Anthropometrics:  Estimated body mass index is 54.92 kg/m  as calculated from the following:    Height as of 10/17/23: 1.613 m (5' 3.5\").    Weight as of 10/17/23: 142.9 kg (315 lb).    Current:  Estimated body mass index is 51.47 kg/m  as calculated from the following:    Height as of this encounter: 1.626 m (5' 4.02\").    Weight as of this encounter: 136.1 kg (300 lb). (-10 lbs in past 4 weeks, -15 lbs from initial)       Medications for Weight Loss:  Naltrexone  Wegovy     NUTRITION HISTORY  Food allergies: None  Food intolerances: seafood  Vitamin/Mineral Supplements: None     Previous methods of diet modification for weight loss: Was 260lbs in high school. Was able to lose from 260lbs to 214lb wyatt year through going to the gym daily " "and low calorie diet. Was not sustainble through the pandemic, and regain weight. More recently had followed 36 hr fast followed by 12 h eating window, consistently for a month. Lost ~10-15 lbs while following. 2.5 years ago, stopped drinking all calorie-containing beverages. Also cut out eating chips and most desserts.   Tracked intake in guilherme called \"Nutricheck.\" Consuming 9360-8815 cole/day, protein:  gm, carb 192-220 gm.       1/12/24 - Injured shoulder which had significantly reduced his physical activity, however is feeling better now. He reports eating 3 meals daily, much smaller portions. No longer following multiple day fasts.       2/5/24 - has tracked nutritional intake for past 25 days. He is not sure if home scale is accurate, but feels like he is losing weight.   Protein intake ranges from 50-70 gm protein daily, some days is only getting 15-20 gm protein. Has been skipping breakfast, otherwise reports he would be over calorie goal.     Today - Switched to Wegovy. Endorses many benefits from starting this med - less appetite, smaller portions, less reflux and mucous.  Increased rate of weight loss this month, losing avg 2-3 lbs per week (~1%).  Calories 0211-9220 cole/day. Feels great at this level. Would have to force intake for greater calorie intake. Consuming  gm protein daily. Going to the gym with brother now as well. Helping to motivate family to make healthier diet change as they are seeing his results.       Progress Towards Previous Goals:  1) Follow 2300 calorie/day plan. Continue tracking in guilherme. - Met, continues   2) Consume 30 gm protein at each meal (breakfast and lunch) and add in a high-protein snack (Consider protein shake/bar for this snack) - Met, continues   3) Add in a serving of fruit or vegetable with at least one meal daily - Not met for fruit, but does get some veggies at lunch and dinner. Planning to incorporate fruit into breakfast.       Physical Activity:  3500 " "steps/day. Doing 100 push-up, sit-ups and curls.   Started going to the gym with his brother.     Nutrition Prescription  Recommended energy/nutrient modification.  7478-2646 calories/day    Nutrition Diagnosis  Obesity r/t long history of positive energy balance aeb BMI >30. - Ongoing    Nutrition Intervention  Materials/education provided on hypocaloric diet for weight loss.   Reviewed progress towards previous goals.   Reviewed calorie/protein intake. Praised pt on maintaining protein needs at lower calorie level.  Encouraged increased fruit and vegetable intake. Recommending daily multivitamin to help meet micronutrient needs.   Patient demonstrates understanding.  Co-developed goals to work towards.   Provided pt with list of goals and resources below via [employer].     Expected Engagement: good  Follow-Up Plans:      Nutrition Goals  1) Continue tracking nutrition in guilherme. Aim for 4969-4870 cole/day.   2) Consume 90+ gm protein daily.   3) Add in a serving of fruit or vegetable with at least one meal daily   4) Consider starting a daily multivitamin       Quick/Easy Protein Sources:  Hard boiled eggs  Part-skim cheese sticks  Baby Bell cheese rounds  Low-fat/low-sugar Greek yogurt  Low-fat cottage cheese  Lean deli meat (chicken/turkey/ham)  Roasted chickpeas or lentils  Nuts   Turkey meat stick  Protein shake/bar  \"P3\" snack (cheese, nuts, deli meat)  Aldi's \"Protein Bread\"   \"Egglife\" egg white wrap    Tuna/salmon can/packet     Protein Sources   [email or web address]       Meal Replacement Shake Options:   *Protein Shake Criteria: no more than 210 Calories, at least 20 grams of protein, and less than 10 grams of sugar   [facility] smoothie (160 Calories, 20 g protein)   Premier Protein (160 Calories, 30 g protein)  Slim Fast Advanced Nutrition (180 Calories, 20 g protein)  Muscle Milk, lactose-free, 17 oz bottle (210 Calories, 30 g protein)  Integrated Supplements, no artificial sugars (110 Calories, " 20 g protein)  Boost/Ensure Max (160 calories, 30 gm protein)   Fairlife Protein Shakes (160-230 calories, 26-42 gm protein)  Aldi's Elevation Protein Powder (180 calories, 30 gm protein)   Orgain Protein Shakes (130-160 calories, 20-26 gm protein)     Meal Replacement Bar Options:  [facility] Protein Shake (160 Calories, 15 g protein)  Quest Protein Bars (190 Calories, 20 g protein)  Built Bar (170 Calories, 15-20 g protein)  One Protein Bar (210 calories, 20 g protein)  [place] Signature Protein Bar (Costco) (190 Calories, 21 g protein)  Pure Protein Bars (180 Calories, 21 g protein)      At Home Exercise Resources  e-Merges.com Library - Exercises by Muscle Group  https://www.Swapbox.org/resources/everyone/exercise-library/    Channels with Exercise Modifications:  Coach Geller (strengthening) https://www.Sendioube.com/@Howard  HASfit (strengthening): https://www.Sendioube.com/channel/GWUIO7-FRJCr76UC-o8UTeyJ  Yoga with Zelinda: https://www.Sendioube.com/@yogawithzelinda    Size-Inclusive Fitness Routines:   Beginner Workout - Standing (low impact)  https://www.Sendioube.com/watch?v=7ReHYMv9TPH      Yoga  https://www.Sendioube.com/watch?v=VdIX8auOH_M&t=36s  https://www.Sendioube.com/watch?v=zUnjJdJitPw    Pilates  https://www.Sendioube.com/watch?v=mWTeqvAQ6Cd    Full Body Strengthening:  https://www.Sendioube.com/watch?v=4ZsWG2m-zg8   https://www.Sendioube.com/watch?v=Q2h55gUtlxE    Other Fitness Channels:  Dance Workouts: The Willie [employer]one   https://www.Sendioube.com/user/Sailaja    HIIT Workouts: PopE[employer] Fitness  https://www.Sendioube.com/user/popsugartvfit    Pilates: Blogilates  https://www.Giveo.com/user/blogilates    Yoga: Yoga with Suma   https://www.youSitrionube.com/user/yogawithadriene/featured      Handouts Relating to Exercise :    1.     Learning About Being Physically Active     2.      Muscle Conditionin Exercises    3.     Resistance Training with Free Weights: 3 Exercises    4.       Resistance Training with Surgical Tubin Exercises    5.     Stretchin Exercises    6.     Seated Exercises for Arms and Legs: 11 Exercises      Follow-Up:  2 months, PRN    Time spent with patient: 11 minutes.  Montserrat Nazario RD, LD

## 2024-03-07 NOTE — PROGRESS NOTES
"Video-Visit Details    Type of service:  Video Visit    Video Start Time: 11:59 AM   Video End Time: 12:10 PM    Originating Location (pt. Location): Home    Distant Location (provider location): Offsite (providers home) [facility] [place]     Platform used for Video Visit: [employer]        Weight Management Nutrition Consultation    Quan \"Danie\" I Joey Davidson is a 18 year old male presents today for return weight management nutrition consultation.  Patient referred by Nirmala Torre PA-C on October 19, 2023.    Patient with Co-morbidities of obesity including:      10/12/2023     9:22 AM   --   I have the following health issues associated with obesity Pre-Diabetes    High Blood Pressure   I have the following symptoms associated with obesity Knee Pain    Back Pain    Fatigue    Hip Pain   Only has one kidney.     Anthropometrics:  Estimated body mass index is 54.92 kg/m  as calculated from the following:    Height as of 10/17/23: 1.613 m (5' 3.5\").    Weight as of 10/17/23: 142.9 kg (315 lb).    Current:  Estimated body mass index is 51.47 kg/m  as calculated from the following:    Height as of this encounter: 1.626 m (5' 4.02\").    Weight as of this encounter: 136.1 kg (300 lb). (-10 lbs in past 4 weeks, -15 lbs from initial)       Medications for Weight Loss:  Naltrexone  Wegovy     NUTRITION HISTORY  Food allergies: None  Food intolerances: seafood  Vitamin/Mineral Supplements: None     Previous methods of diet modification for weight loss: Was 260lbs in high school. Was able to lose from 260lbs to 214lb wyatt year through going to the gym daily and low calorie diet. Was not sustainble through the pandemic, and regain weight. More recently had followed 36 hr fast followed by 12 h eating window, consistently for a month. Lost ~10-15 lbs while following. 2.5 years ago, stopped drinking all calorie-containing beverages. Also cut out eating chips and most desserts.   Tracked " "intake in guilherme called \"Nutricheck.\" Consuming 6648-4667 cole/day, protein:  gm, carb 192-220 gm.       1/12/24 - Injured shoulder which had significantly reduced his physical activity, however is feeling better now. He reports eating 3 meals daily, much smaller portions. No longer following multiple day fasts.       2/5/24 - has tracked nutritional intake for past 25 days. He is not sure if home scale is accurate, but feels like he is losing weight.   Protein intake ranges from 50-70 gm protein daily, some days is only getting 15-20 gm protein. Has been skipping breakfast, otherwise reports he would be over calorie goal.     Today - Switched to Wegovy. Endorses many benefits from starting this med - less appetite, smaller portions, less reflux and mucous.  Increased rate of weight loss this month, losing avg 2-3 lbs per week (~1%).  Calories 8439-4266 cole/day. Feels great at this level. Would have to force intake for greater calorie intake. Consuming  gm protein daily. Going to the gym with brother now as well. Helping to motivate family to make healthier diet change as they are seeing his results.       Progress Towards Previous Goals:  1) Follow 2300 calorie/day plan. Continue tracking in guilherme. - Met, continues   2) Consume 30 gm protein at each meal (breakfast and lunch) and add in a high-protein snack (Consider protein shake/bar for this snack) - Met, continues   3) Add in a serving of fruit or vegetable with at least one meal daily - Not met for fruit, but does get some veggies at lunch and dinner. Planning to incorporate fruit into breakfast.       Physical Activity:  3500 steps/day. Doing 100 push-up, sit-ups and curls.   Started going to the gym with his brother.     Nutrition Prescription  Recommended energy/nutrient modification.  5190-7712 calories/day    Nutrition Diagnosis  Obesity r/t long history of positive energy balance aeb BMI >30. - Ongoing    Nutrition Intervention  Materials/education " "provided on hypocaloric diet for weight loss.   Reviewed progress towards previous goals.   Reviewed calorie/protein intake. Praised pt on maintaining protein needs at lower calorie level.  Encouraged increased fruit and vegetable intake. Recommending daily multivitamin to help meet micronutrient needs.   Patient demonstrates understanding.  Co-developed goals to work towards.   Provided pt with list of goals and resources below via [employer].     Expected Engagement: good  Follow-Up Plans:      Nutrition Goals  1) Continue tracking nutrition in guilherme. Aim for 2830-2084 cole/day.   2) Consume 90+ gm protein daily.   3) Add in a serving of fruit or vegetable with at least one meal daily   4) Consider starting a daily multivitamin       Quick/Easy Protein Sources:  Hard boiled eggs  Part-skim cheese sticks  Baby Bell cheese rounds  Low-fat/low-sugar Greek yogurt  Low-fat cottage cheese  Lean deli meat (chicken/turkey/ham)  Roasted chickpeas or lentils  Nuts   Turkey meat stick  Protein shake/bar  \"P3\" snack (cheese, nuts, deli meat)  Aldi's \"Protein Bread\"   \"Egglife\" egg white wrap    Tuna/salmon can/packet     Protein Sources   [email or web address]       Meal Replacement Shake Options:   *Protein Shake Criteria: no more than 210 Calories, at least 20 grams of protein, and less than 10 grams of sugar   [facility] smoothie (160 Calories, 20 g protein)   Premier Protein (160 Calories, 30 g protein)  Slim Fast Advanced Nutrition (180 Calories, 20 g protein)  Muscle Milk, lactose-free, 17 oz bottle (210 Calories, 30 g protein)  Integrated Supplements, no artificial sugars (110 Calories, 20 g protein)  Boost/Ensure Max (160 calories, 30 gm protein)   Fairlife Protein Shakes (160-230 calories, 26-42 gm protein)  Aldi's Elevation Protein Powder (180 calories, 30 gm protein)   Orgain Protein Shakes (130-160 calories, 20-26 gm protein)     Meal Replacement Bar Options:  [facility] Protein Shake (160 Calories, 15 g " protein)  Quest Protein Bars (190 Calories, 20 g protein)  Built Bar (170 Calories, 15-20 g protein)  One Protein Bar (210 calories, 20 g protein)  [place] Signature Protein Bar (Costco) (190 Calories, 21 g protein)  Pure Protein Bars (180 Calories, 21 g protein)      At Home Exercise Resources  [employer] Library - Exercises by Muscle Group  https://www.SnappyTV.org/resources/everyone/exercise-library/    Channels with Exercise Modifications:  Coach Geller (strengthening) https://www.SmartPay Solutionsube.com/@Howard  HASfit (strengthening): https://www.SmartPay Solutionsube.com/channel/HSNPF5-GFMBq75AR-j3XYiwM  Yoga with Zelinda: https://www.SmartPay Solutionsube.com/@yogawithzelinda    Size-Inclusive Fitness Routines:   Beginner Workout - Standing (low impact)  https://www.SmartPay Solutionsube.com/watch?v=4DaLBBq0AWP      Yoga  https://www.SmartPay Solutionsube.com/watch?v=VdIX8auOH_M&t=36s  https://www.SmartPay Solutionsube.com/watch?v=zUnjJdJitPw    Pilates  https://www.SmartPay Solutionsube.com/watch?v=dBKnyuVN5Mt    Full Body Strengthening:  https://www.SmartPay Solutionsube.com/watch?v=1UbUB5p-go8   https://www.SmartPay Solutionsube.com/watch?v=I1o27lLklnG    Other Fitness Channels:  Dance Workouts: Etece Liborio   https://www.SmartPay Solutionsube.com/user/TheMissouri Delta Medical CenterLiborio    HIIT Workouts: [employer] Fitness  https://www.SmartPay Solutionsube.com/user/popsugartvfit    Pilates: Blogilates  https://www.SmartPay Solutionsube.com/user/blogilates    Yoga: Yoga with Suma   https://www.SmartPay Solutionsube.com/user/yogawithadriene/featured      Handouts Relating to Exercise :    1.     Learning About Being Physically Active     2.      Muscle Conditionin Exercises    3.     Resistance Training with Free Weights: 3 Exercises    4.      Resistance Training with Surgical Tubin Exercises    5.     Stretchin Exercises    6.     Seated Exercises for Arms and Legs: 11 Exercises      Follow-Up:  2 months, PRN    Time spent with patient: 11 minutes.  Montserrat Nazario RD, LD

## 2024-03-07 NOTE — PATIENT INSTRUCTIONS
"Hi Jose Daniel,    Follow-up with RD in 2 months.     Thank you,    Montserrat Nazario, RD, LD  If you would like to schedule or reschedule an appointment with the RD, please call [phone]    Nutrition Goals  1) Continue tracking nutrition in guilherme. Aim for 9399-9674 cole/day.   2) Consume 90+ gm protein daily.   3) Add in a serving of fruit or vegetable with at least one meal daily   4) Consider starting a daily multivitamin       Quick/Easy Protein Sources:  Hard boiled eggs  Part-skim cheese sticks  Baby Bell cheese rounds  Low-fat/low-sugar Greek yogurt  Low-fat cottage cheese  Lean deli meat (chicken/turkey/ham)  Roasted chickpeas or lentils  Nuts   Turkey meat stick  Protein shake/bar  \"P3\" snack (cheese, nuts, deli meat)  Aldi's \"Protein Bread\"   \"Egglife\" egg white wrap    Tuna/salmon can/packet     Protein Sources   http://"[employer][email or web address].pdf       Meal Replacement Shake Options:   *Protein Shake Criteria: no more than 210 Calories, at least 20 grams of protein, and less than 10 grams of sugar   [facility] smoothie (160 Calories, 20 g protein)   Premier Protein (160 Calories, 30 g protein)  Slim Fast Advanced Nutrition (180 Calories, 20 g protein)  Muscle Milk, lactose-free, 17 oz bottle (210 Calories, 30 g protein)  Integrated Supplements, no artificial sugars (110 Calories, 20 g protein)  Boost/Ensure Max (160 calories, 30 gm protein)   Fairlife Protein Shakes (160-230 calories, 26-42 gm protein)  Aldi's Elevation Protein Powder (180 calories, 30 gm protein)   Orgain Protein Shakes (130-160 calories, 20-26 gm protein)     Meal Replacement Bar Options:  [facility] Protein Shake (160 Calories, 15 g protein)  Quest Protein Bars (190 Calories, 20 g protein)  Built Bar (170 Calories, 15-20 g protein)  One Protein Bar (210 calories, 20 g protein)  Grossman Signature Protein Bar (Costco) (190 Calories, 21 g protein)  Pure Protein Bars (180 Calories, 21 g protein)      At Home Exercise Resources  ACE Fitness Library - " Exercises by Muscle Group  https://www.acefitness.org/resources/everyone/exercise-library/    Channels with Exercise Modifications:  Coach Geller (strengthening) https://www.Maeglin Softwareube.com/@Howard Betancourt (strengthening): https://www.Maeglin Softwareube.com/channel/GWMBP7-RCUYx69KF-i6TWdgG  Yoga with Zelinda: https://www.Maeglin Softwareube.com/@yogawithzelinda    Size-Inclusive Fitness Routines:   Beginner Workout - Standing (low impact)  https://www.Maeglin Softwareube.com/watch?v=5BhYGTo3RAM      Yoga  https://www.Maeglin Softwareube.com/watch?v=VdIX8auOH_M&t=36s  https://www.Maeglin Softwareube.com/watch?v=zUnjJdJitPw    Pilates  https://www.Maeglin Softwareube.com/watch?v=sDToqhBN3Nd    Full Body Strengthening:  https://www.Maeglin Softwareube.com/watch?v=9QxXW1y-my3   https://www.Maeglin Softwareube.com/watch?v=E7w32iQygsR    Other Fitness Channels:  Dance Workouts: The Fitness Liborio   https://www.Maeglin Softwareube.[place]/user/[facility]    HIIT Workouts: PopSugar Fitness  https://www.Maeglin Softwareube.[place]edo/user/popsugartvfit    Pilates: Blogilates  https://www."Toppermost[employer].Boedo/user/blogilates    Yoga: Yoga with Suma   https://www."Toppermost[employer].Boedo/user/yogawithadriene/featured      Handouts Relating to Exercise :    1.     Learning About Being Physically Active     2.      Muscle Conditionin Exercises    3.     Resistance Training with Free Weights: 3 Exercises    4.      Resistance Training with Surgical Tubin Exercises    5.     Stretchin Exercises    6.     Seated Exercises for Arms and Legs: 11 Exercises      COMPREHENSIVE WEIGHT MANAGEMENT PROGRAM  VIRTUAL SUPPORT GROUPS    At [facility], our Comprehensive Weight Management program offers on-line support groups for patients who are working on weight loss and considering, preparing for, or have had weight loss surgery.     There is no cost for this opportunity.  You are invited to attend the?Virtual Support Groups?provided by any of the following locations:    [facility] via [employer] Teams with Karen Hendrix RN  2.   [place] via [employer]  Teams with Bebeto Liu, PhD, LP  3.   [place] via [employer] Teams with Brianda Short RN  4.   [facility] via a Zoom Meeting with JIMENEZ Rose-    The following Support Group information can also be found on our website:  https://www.[facility] Centerir[facility].org/treatments/weight-loss-and-weight-loss-surgery-support-groups      [facility] Weight Loss Surgery Support Group  The support group is a patient-lead forum that meets monthly to share experiences, encouragement and education. It is open to those who have had weight loss surgery, are scheduled for surgery, or are considering surgery.   WHEN: This group meets on the 3rd Wednesday of each month from 5:00PM - 6:00PM virtually using Microsoft Teams.   FACILITATOR: Led by Karen Abdullahi RD, LD, RN, the program's Clinical Coordinator.   TO REGISTER: Please contact the clinic via [employer] or call the nurse line directly at [phone] to inform our staff that you would like an invite sent to you and to let us know the email you would like the invite sent to. Prior to the meeting, a link with directions on how to join the meeting will be sent to you.    2023 and 2024 Meetings   December 20  January 17  February 21  March 20  April 17  May 15  Nayely 19      [facility] Bariatric Care Support Group?  This is open to all pre- and post- operative bariatric surgery patients as well as their support system.   WHEN: This group meets the 3rd Tuesday of each month from 6:30 PM - 8:00 PM virtually using Microsoft Teams.   FACILITATOR: Led by Bebeto Liu, Ph.D who is a Licensed Psychologist with the [facility] Comprehensive Weight Management Program.   TO REGISTER: Please send an email to Bebeto Liu, Ph.D., LP at?[email or web address]?if you would like an invitation to the group. Prior to the meeting, a link with directions on how to join the meeting will be sent to you.    2023  "and 2024 Meetings  December 19 January 16: \"Medication Management and Bariatric Surgery\", Ramya Chaparro, PharmD, Pharmacy Resident at [facility], [facility]  February 20: \"A Bariatric Surgery Patient's Perspective\", WILI Farrar, [facility], Behavioral Health Clinician at [facility]  March 19  April 16  May 21  Nayely 18: \"Nutritional Labeling\", Dietitian speaker to be announced.  November 19: \"Holiday Eating\", Dietitian speaker to be announced.    [facility] Post-Operative Bariatric Surgery Support Group  This is a support group for [facility] bariatric patients (and those external to [facility]) who have had bariatric surgery and are at least 3 months post-surgery.  WHEN: This support group meets the 4th Wednesday of the month from 11:00 AM - 12:00 PM virtually using Microsoft Teams.   FACILITATOR: Led by Certified Bariatric Nurse, Brianda Short RN.   TO REGISTER: Please send an email to Brianda at [email or web address] if you would like an invitation to the group.  Prior to the meeting, a link with directions on how to join the meeting will be sent to you.    2023 and 2024 Meetings  December 27  January 24  February 28  March 27  April 24  May 22  Nayely 26    [facility] Healthy Lifestyle Group?  This is a 60 minute virtual coaching group for those who want to lead a healthier lifestyle. Come together to set goals and overcome barriers in a supportive group environment. We will address the four pillars of health: nutrition, exercise, sleep and emotional well-being.  This group is highly recommended for those who are participating in the 24 week Healthy Lifestyle Plan and our Health Coaching sessions.  WHEN: This group meets the 1st Friday of the month, 12:30 PM - 1:30 PM online, via a zoom meeting.    FACILITATOR: Led by National Board Certified Health and Wellness " ", Brianda Miller, [facility].   TO REGISTER: Please call the Call Center at [phone] to register.  You will get an appointment to attend in [facility]. Fifteen minutes prior to the meeting, complete the e-check in and you will get the link to join the meeting.    There is no charge to attend this group and space is limited.     2023 and 2024 Meetings  December 1: \"Let's Talk\" (guided discussion on our wins and challenges)  January 5: \"New Years Vision: Manifest your Best 2024!\" (guided imagery,  journaling and discussion)  February 2: \"Let's Talk\"  March 1: \"10 Percent Happier\" by Juan Jose Munoz (Book Bites - a guided discussion on the nuggets of wisdom from favorite wellness books, no need to read the book but highly encouraged)  April 5: \"Let's Talk\"  May 3: \"Essentialism: The Disciplined Pursuit of Less\" by Glen Alex (Book Bites discussion)  June 7: \"Let's Talk\"  July 5: NO MEETING, off for the 4th of July Holiday  August 2: \"The Blue Zones, Secrets for Living a Longer Life\" by Juan Jose Hopkins (Book Bites discussion)                    "

## 2024-03-07 NOTE — NURSING NOTE
Is the patient currently in the state of [place]? YES    Visit mode:VIDEO    If the visit is dropped, the patient can be reconnected by: VIDEO VISIT: Text to cell phone:   Telephone Information:   Mobile [phone]       Will anyone else be joining the visit? NO  (If patient encounters technical issues they should call [phone]    How would you like to obtain your AVS? Declined    Are changes needed to the allergy or medication list? Pt stated no changes to allergies and Pt stated no med changes    Reason for visit: Follow Up    Paula VEGA

## 2024-03-08 ENCOUNTER — OFFICE VISIT (OUTPATIENT)
Dept: NEPHROLOGY | Facility: CLINIC | Age: 19
End: 2024-03-08
Attending: STUDENT IN AN ORGANIZED HEALTH CARE EDUCATION/TRAINING PROGRAM
Payer: COMMERCIAL

## 2024-03-08 ENCOUNTER — LAB (OUTPATIENT)
Dept: LAB | Facility: CLINIC | Age: 19
End: 2024-03-08
Attending: STUDENT IN AN ORGANIZED HEALTH CARE EDUCATION/TRAINING PROGRAM
Payer: COMMERCIAL

## 2024-03-08 VITALS
WEIGHT: 297.5 LBS | OXYGEN SATURATION: 97 % | SYSTOLIC BLOOD PRESSURE: 150 MMHG | HEART RATE: 102 BPM | DIASTOLIC BLOOD PRESSURE: 98 MMHG | BODY MASS INDEX: 51.04 KG/M2

## 2024-03-08 DIAGNOSIS — N18.1 CKD STAGE G1/A1, GFR > 90 AND ALBUMIN CREATININE RATIO <30 MG/G: Primary | ICD-10-CM

## 2024-03-08 DIAGNOSIS — Z90.5 S/P NEPHRECTOMY: ICD-10-CM

## 2024-03-08 DIAGNOSIS — R79.89 ELEVATED SERUM CREATININE: ICD-10-CM

## 2024-03-08 LAB
ALBUMIN MFR UR ELPH: 31.1 MG/DL
ALBUMIN SERPL BCG-MCNC: 4.5 G/DL (ref 3.5–5.2)
ALBUMIN UR-MCNC: 20 MG/DL
ANION GAP SERPL CALCULATED.3IONS-SCNC: 10 MMOL/L (ref 7–15)
APPEARANCE UR: CLEAR
BILIRUB UR QL STRIP: NEGATIVE
BUN SERPL-MCNC: 10.6 MG/DL (ref 6–20)
CALCIUM SERPL-MCNC: 9.6 MG/DL (ref 8.6–10)
CHLORIDE SERPL-SCNC: 106 MMOL/L (ref 98–107)
COLOR UR AUTO: YELLOW
CREAT SERPL-MCNC: 0.84 MG/DL (ref 0.67–1.17)
CREAT UR-MCNC: 258 MG/DL
DEPRECATED HCO3 PLAS-SCNC: 24 MMOL/L (ref 22–29)
EGFRCR SERPLBLD CKD-EPI 2021: >90 ML/MIN/1.73M2
ERYTHROCYTE [DISTWIDTH] IN BLOOD BY AUTOMATED COUNT: 12.7 % (ref 10–15)
GLUCOSE SERPL-MCNC: 90 MG/DL (ref 70–99)
GLUCOSE UR STRIP-MCNC: NEGATIVE MG/DL
HCT VFR BLD AUTO: 44.7 % (ref 40–53)
HGB BLD-MCNC: 15.3 G/DL (ref 13.3–17.7)
HGB UR QL STRIP: NEGATIVE
KETONES UR STRIP-MCNC: ABNORMAL MG/DL
LEUKOCYTE ESTERASE UR QL STRIP: NEGATIVE
MCH RBC QN AUTO: 27.7 PG (ref 26.5–33)
MCHC RBC AUTO-ENTMCNC: 34.2 G/DL (ref 31.5–36.5)
MCV RBC AUTO: 81 FL (ref 78–100)
MUCOUS THREADS #/AREA URNS LPF: PRESENT /LPF
NITRATE UR QL: NEGATIVE
PH UR STRIP: 6 [PH] (ref 5–7)
PHOSPHATE SERPL-MCNC: 2.9 MG/DL (ref 2.5–4.5)
PLATELET # BLD AUTO: 408 10E3/UL (ref 150–450)
POTASSIUM SERPL-SCNC: 4 MMOL/L (ref 3.4–5.3)
PROT/CREAT 24H UR: 0.12 MG/MG CR (ref 0–0.2)
RBC # BLD AUTO: 5.53 10E6/UL (ref 4.4–5.9)
RBC URINE: 2 /HPF
SODIUM SERPL-SCNC: 140 MMOL/L (ref 135–145)
SP GR UR STRIP: 1.03 (ref 1–1.03)
SQUAMOUS EPITHELIAL: 1 /HPF
UROBILINOGEN UR STRIP-MCNC: 2 MG/DL
WBC # BLD AUTO: 10.9 10E3/UL (ref 4–11)
WBC URINE: 2 /HPF

## 2024-03-08 PROCEDURE — 80069 RENAL FUNCTION PANEL: CPT | Performed by: PATHOLOGY

## 2024-03-08 PROCEDURE — 82570 ASSAY OF URINE CREATININE: CPT | Performed by: STUDENT IN AN ORGANIZED HEALTH CARE EDUCATION/TRAINING PROGRAM

## 2024-03-08 PROCEDURE — G0463 HOSPITAL OUTPT CLINIC VISIT: HCPCS | Performed by: STUDENT IN AN ORGANIZED HEALTH CARE EDUCATION/TRAINING PROGRAM

## 2024-03-08 PROCEDURE — 99000 SPECIMEN HANDLING OFFICE-LAB: CPT | Performed by: PATHOLOGY

## 2024-03-08 PROCEDURE — 36415 COLL VENOUS BLD VENIPUNCTURE: CPT | Performed by: PATHOLOGY

## 2024-03-08 PROCEDURE — 85027 COMPLETE CBC AUTOMATED: CPT | Performed by: PATHOLOGY

## 2024-03-08 PROCEDURE — 84156 ASSAY OF PROTEIN URINE: CPT | Performed by: PATHOLOGY

## 2024-03-08 PROCEDURE — 99214 OFFICE O/P EST MOD 30 MIN: CPT | Performed by: STUDENT IN AN ORGANIZED HEALTH CARE EDUCATION/TRAINING PROGRAM

## 2024-03-08 PROCEDURE — 81001 URINALYSIS AUTO W/SCOPE: CPT | Performed by: PATHOLOGY

## 2024-03-08 RX ORDER — LISINOPRIL 10 MG/1
10 TABLET ORAL DAILY
Qty: 90 TABLET | Refills: 3 | Status: SHIPPED | OUTPATIENT
Start: 2024-03-08 | End: 2025-03-03

## 2024-03-08 ASSESSMENT — PAIN SCALES - GENERAL: PAINLEVEL: NO PAIN (0)

## 2024-03-08 NOTE — PATIENT INSTRUCTIONS
"It was a pleasure to see you in nephrology clinic today. I've included a brief summary of our discussion and care plan from today's visit below.  _______________________________________________________________________    My recommendations are summarized as follows:  -Keep a Blood Pressure log. Please make sure that you are using a validated blood pressure device (check \"www.validatebp.org\").  -Avoid all NSAID's. Examples include Ibuprofen (Advil, Motrin), naprosyn (Aleve), celebrex among others. Acetaminophen (Tylenol) is ok with maximum dose in 24 hours of 3000mg.  -Healthy lifestyle measures will keep your kidney's functioning at their current best. This includes regular exercise, maintaining a healthy body weight and smoking cessation.   -We are going to start Lisinopril 10mg 1x per day. Update me with blood pressure readings about 1 week after starting this.   -I am placing a BMP order for about 2 weeks from now. Please call to schedule this lab appointment. I will reach out to you when I get the lab results and let you know if there are issues.     Please return to Nephrology Clinic in 6 months to review your progress.     Who do I call with any questions after my visit?  There are multiple ways to contact your nephrology care team:    -To schedule or reschedule an appointment, please call [phone].  -Reach out via RichRelevance. These messages are answered by your nurse or doctor during business hours and typically in 1-2 days. RichRelevance messages are best for quick questions/clarifications/updates. Frequently, your doctor or nurse will recommend setting up a follow up appointment to address any significant questions/concerns.  -For urgent questions after business hours, you may reach the on-call Nephrology Fellow by contacting the [facility]  at [phone].    To schedule imaging:   -Please call [phone]     To schedule your lab appointment at the [facility] and [facility]:  -Please call " [phone]    Sincerely,    Dr. Enrrique Ibarra   of Medicine  Division of Nephrology and Hypertension  [facility]

## 2024-03-08 NOTE — NURSING NOTE
Chief Complaint   Patient presents with    RECHECK     6 month follow up. Wants to check where pre diabetes is at. Wants to talk about proteinuria and kdiney function.     Vitals:    03/08/24 1540 03/08/24 1544 03/08/24 1546 03/08/24 1547   BP: (!) 161/100 (!) 141/95 (!) 146/98 (!) 150/98   BP Location: Right arm Left arm Left arm    Patient Position: Sitting Sitting Sitting    Cuff Size: Adult Large Adult Regular Adult Regular    Pulse: 102      SpO2: 97%      Weight: 134.9 kg (297 lb 8 oz)          BP Readings from Last 3 Encounters:   03/08/24 (!) 150/98   03/04/24 (!) 143/74   02/05/24 (!) 140/90       BP (!) 150/98   Pulse 102   Wt 134.9 kg (297 lb 8 oz)   SpO2 97%   BMI 51.04 kg/m       Madelin Rodriguez

## 2024-03-08 NOTE — LETTER
3/8/2024       RE: Quan Davidson  [address]  [place] [address]     Dear Colleague,    Thank you for referring your patient, Quan Davidson, to the [facility]OLOG[facility] [place] [facility]. Please see a copy of my visit note below.      -    Nephrology Clinic Visit  Quan Davidson MRN: [id number] YOB: 2005  Primary Care Provider: Nidhi Little  ----------------------------------------------------------------------------------------------------------------------  Visit 3/8/2024:  -BP Control: 150/98 (in office). Home readings: His home readings vary. He reports usually getting readings in the 140's systolic and usually in the 80's diastolic.   --Current Regimen: None (previously on Lisinopril - this was about 4 years ago)  -DM Control: Fasting glucose acceptable. Unlikely to have DM  --Current Regimen: None  -Creatinine Trend: 0.94 (9/6/2023) from 0.9 (6/10/2023)  -Wegovy: No issues with diarrhea. Had a bit of nausea at the start.     Visit 9/6/2023:  --BP Control: 143/73 today in office.   --Current Regimen: None (previously on Lisinopril - this was about 4 years ago)  -DM Control: Fasting glucose acceptable. Unlikely to have DM  --Current Regimen: None  -Creatinine Trend: 0.94 (9/6/2023) from 0.9 (6/10/2023)  -Nocturia:0x  -Hematuria: No  -Nephrolithiasis: No  -NSAID Use: Tylenol, very rare.   -Herbal/OTC Medication Use: no  -Family History of Kidney Disease: No  -Family/Friends on RRT/Kidney Transplant: No/No    -Other:  --History of Nephrectomy: Procedure went smoothly.   --History of Hypertension: Was treated for about 2.5 years with Lisnoril. Came off about 4 years ago.;   --History of Obesity: Referred to medical weight management clinic.  Also doing intermittent fasting and this has helped. Has lost about 5lbs.   --Last saw Nephrology about 4 years ago.   --He had COVID when he lived in  [place].   --He is currently doing [employer] classes online. Interested in [employer]    Objective:  PAST MEDICAL HISTORY:  Past Medical History:   Diagnosis Date    Glaucoma (increased eye pressure)     Term birth of male      Traumatic brain injury (H) 2014    UPJ (ureteropelvic junction) obstruction 03/10/2015    UPJ obstruction, congenital 2014    identified on imaging for unrelated trauma       PAST SURGICAL HISTORY:  Past Surgical History:   Procedure Laterality Date    NO HISTORY OF SURGERY         MEDICATIONS:  Current Outpatient Medications   Medication Instructions    desonide (DESOWEN) 0.05 % external ointment Topical, 2 TIMES DAILY    doxycycline monohydrate (ADOXA) 50 MG tablet 1 tablet, Oral, 2 TIMES DAILY (Diuretics and Nitrates)    tacrolimus (PROTOPIC) 0.1 % external ointment Topical, 2 TIMES DAILY       FAMILY MEDICAL HISTORY:   Family History   Problem Relation Age of Onset    Diabetes Father     Hypertension Father        PHYSICAL EXAM:   BP (!) 150/98   Pulse 102   Wt 134.9 kg (297 lb 8 oz)   SpO2 97%   BMI 51.04 kg/m    GENERAL APPEARANCE: alert and no distress  EYES: nonicteric  HENT: mouth without ulcers or lesions  RESP: lungs clear to auscultation   CV: regular rhythm, normal rate, no rub  ABDOMEN: soft, nontender, normal bowel sounds, no HSM   Extremities: No LE edema  MS: no evidence of inflammation in joints, no muscle tenderness  SKIN: no rash  NEURO: mentation intact and speech normal  PSYCH: affect normal    LABS REVIEWED BY ME:   ANEMIA  Recent Labs   Lab Test 24  1022 23  1346 06/10/23  1901   HGB 15.4 14.9 15.4       BMP  Recent Labs   Lab Test 23  1346 06/10/23  1901    142   POTASSIUM 3.9 4.6   CHLORIDE 104 105   CO2 25 27   ANIONGAP 11 10   BUN 9.3 13.4   CR 0.94 0.90   GFRESTIMATED >90 >90   PROTTOTAL  --  8.1       CBC  Recent Labs   Lab Test 24  1022 23  1346 06/10/23  1901   HGB 15.4 14.9 15.4   WBC  --  10.5 12.2*   HCT  --  43.9  "45.5   MCV  --  80 83   PLT  --  419 434       DIABETES  Recent Labs   Lab Test 09/06/23  1346   A1C 6.0*       HYPONATREMIANo lab results found.    Invalid input(s): \"UOSM\", \"OSM\"    MBD  Recent Labs   Lab Test 09/06/23  1346 06/10/23  1901   JOSE ARMANDO 9.6 9.9   ALBUMIN 4.4 4.5   PHOS 2.6  --         URINE STUDIES  Recent Labs   Lab Test 09/06/23  1350   COLOR Light Yellow   APPEARANCE Clear   URINEGLC 300*   URINEBILI Negative   URINEKETONE Negative   SG 1.023   UBLD Negative   URINEPH 5.5   PROTEIN 10*   NITRITE Negative   LEUKEST Negative   RBCU 1   WBCU 1     No lab results found.    ADDITIONAL LABS ORDERED/REVIEWED BY ME:  See below    Assessment/Plan  CKD Stage G1A1  Established care with me at [facility] 9/6/2023    History of UPJ Obstruction and s/p R Nephrectomy.(Due to poor function and pediatric HTN). Was previously on Lisinopril for about 2.5 years or so. Thinks he came off BP medications about 4 years prior to establishign care with me. His BP is elevated in office today. He does not have a home BP cuff.    Creatinine is stable at his baseline of 0.9. No hematuria or pyuria on his UA. UPCR is 0.11g/gr.     At follow up 3/8/2024  -Following with medical weight loss clinic and down 20lbs!  -BP up to the 150's. Home readings in the 140's typically. Starting Lisniopril 10mg qDay.     CKD Etiology (Biopsy Proven: No):  -Reduced Renal Mass (R Nephrectomy 2015  -Morbid Obesity  -Hypertensive Nephrosclerosis    Plan:  -Optimize BP Control: Start Lisinopril 10mg qDay. Update BMP in 2 weeks. Send me home readings 7-10 days after starting Lisinopril.   -Avoid NSAIDs  -Follow up with medical weight loss clinic. Discussed glomerular hyperfiltration injury and importance of getting BMI down. Discussed monitoring for GI symptoms with GLP1RA.     Other:  -Specialty Care Coordination Referral - CKD G1-G3b w/o imminent RRT planning/needs (Yes/no, Date Referral Placed): No  -Med Therapy Management Referral (Yes/No, Date of " Referral): No    Return to clinic: 6 months    Enrrique Ibarra MD   of Medicine  Division of Nephrology and Hypertension  [facility]    20 minutes spent on the date of the encounter doing chart review, history and exam, documentation and further activities as noted above     Statement Selected

## 2024-03-09 LAB
CREAT UR-MCNC: 261 MG/DL
MICROALBUMIN UR-MCNC: 92.5 MG/L
MICROALBUMIN/CREAT UR: 35.44 MG/G CR (ref 0–17)

## 2024-03-11 ENCOUNTER — TELEPHONE (OUTPATIENT)
Dept: ENDOCRINOLOGY | Facility: CLINIC | Age: 19
End: 2024-03-11
Payer: COMMERCIAL

## 2024-03-11 ENCOUNTER — TELEPHONE (OUTPATIENT)
Dept: LAB | Facility: CLINIC | Age: 19
End: 2024-03-11
Payer: COMMERCIAL

## 2024-03-11 NOTE — TELEPHONE ENCOUNTER
Patient Contacted and scheduled the following:    Appointment type: RET [facility] Nutrition  Provider: Montserrat Nazario RD   Return date: 05/07/2024  Specialty phone number: [phone]  Additional appointment(s) needed: no   Additonal Notes: no

## 2024-03-11 NOTE — PROGRESS NOTES
Called patient to discuss his lab appointment for 3/13/2024.  No orders are in his chart and he just thought he should make an appointment because his MyChart said he was over due for labs.  Per the notes form Dr Ibarra- he is to return in 2 weeks for a BMP. (As of 3/8/2024)  I told him to check to see the orders are in the chart an make an appointment for this time.

## 2024-03-12 ENCOUNTER — TELEPHONE (OUTPATIENT)
Dept: DERMATOLOGY | Facility: CLINIC | Age: 19
End: 2024-03-12

## 2024-03-12 ENCOUNTER — OFFICE VISIT (OUTPATIENT)
Dept: DERMATOLOGY | Facility: CLINIC | Age: 19
End: 2024-03-12
Payer: COMMERCIAL

## 2024-03-12 DIAGNOSIS — L29.9 PRURITUS: ICD-10-CM

## 2024-03-12 DIAGNOSIS — L20.89 FLEXURAL ATOPIC DERMATITIS: ICD-10-CM

## 2024-03-12 DIAGNOSIS — H01.136 EYELID DERMATITIS, ECZEMATOUS, LEFT: Primary | ICD-10-CM

## 2024-03-12 PROCEDURE — 99215 OFFICE O/P EST HI 40 MIN: CPT | Mod: GC | Performed by: STUDENT IN AN ORGANIZED HEALTH CARE EDUCATION/TRAINING PROGRAM

## 2024-03-12 RX ORDER — DESONIDE 0.5 MG/G
OINTMENT TOPICAL 2 TIMES DAILY
COMMUNITY
End: 2024-05-07

## 2024-03-12 RX ORDER — FEXOFENADINE HCL 180 MG/1
TABLET ORAL
Qty: 60 TABLET | Refills: 3 | Status: SHIPPED | OUTPATIENT
Start: 2024-03-12 | End: 2024-04-04 | Stop reason: ALTCHOICE

## 2024-03-12 RX ORDER — TACROLIMUS 1 MG/G
OINTMENT TOPICAL 2 TIMES DAILY
COMMUNITY
End: 2024-03-12

## 2024-03-12 NOTE — PROGRESS NOTES
[facility] Dermatology Note  Encounter Date: Mar 12, 2024  Office Visit     Dermatology Problem List:  1. Flexural eczema  2. Eyelid dermatitis, allergic vs eczematous    ____________________________________________    Assessment & Plan:     #Eyelid dermatitis  #Atopic dermatitis  #Pruritus  Hyperpigmentation under bilateral eyes, with lichenification and flaking noted under left eye. Given intermittent episodes of pruritus throughout the day, with spontaneous resolution, suspect type I allergic reaction.   - allegra 1-2 times daily  - continue pimecrolimus as tolerated    Procedures Performed:   None    Follow-up: 3 month(s) in-person, or earlier for new or changing lesions    Staff and Resident:     Ana Leone MD  [facility] Family Medicine PGY-3    Norman Couch MD  ____________________________________________    CC: Derm Problem (Follow up for rash on face and arm. Pt states it has mostly cleared and had only had two recent flare on his right arm. )    HPI:  Mr. Quan Davidson is a(n) 18 year old male who presents today for follow-up  for eczema.     Twice daily hydrocortisone anti-itch cream has helped his eczema for 4-5 months. Patch testing positive for lanolin.     Areas affected is left eye, flexural areas. Tacrolimus caused burning, prescribed pimecrolimus, though still having burning.     Patient is otherwise feeling well, without additional skin concerns.     Labs Reviewed:  N/A    Physical Exam:  Vitals: There were no vitals taken for this visit.  SKIN: Focused examination of bilateral arms and face was performed.  - hyperpigmentation of bilateral lower eyelids with lichenification of left lower eyelid  - There are hyperpigmented patches and plaques on the bilateral arms and elbow flexures.     - No other lesions of concern on areas examined.     Medications:  Current Outpatient Medications   Medication    azelastine (ASTELIN) 0.1 % nasal spray    budesonide-formoterol  (SYMBICORT) 160-4.5 MCG/ACT Inhaler    desonide (DESOWEN) 0.05 % external ointment    famotidine (PEPCID) 40 MG tablet    lisinopril (ZESTRIL) 10 MG tablet    naltrexone (DEPADE/REVIA) 50 MG tablet    olopatadine (PATANOL) 0.1 % ophthalmic solution    Semaglutide-Weight Management (WEGOVY) 1.7 MG/0.75ML pen    tacrolimus (PROTOPIC) 0.1 % external ointment    pantoprazole (PROTONIX) 40 MG EC tablet     No current facility-administered medications for this visit.      Past Medical History:   Patient Active Problem List   Diagnosis    Elevated blood pressure    Pre-diabetes    Class 3 severe obesity with serious comorbidity and body mass index (BMI) of 50.0 to 59.9 in adult (H)     Past Medical History:   Diagnosis Date    Glaucoma (increased eye pressure)     Term birth of male      Traumatic brain injury (H) 2014    UPJ (ureteropelvic junction) obstruction 03/10/2015    UPJ obstruction, congenital 2014    identified on imaging for unrelated trauma       CC Vivian Gonzalez MD  [address] [place] [address] on close of this encounter.

## 2024-03-12 NOTE — NURSING NOTE
Dermatology Rooming Note    Quan Davidson's goals for this visit include:   Chief Complaint   Patient presents with    Derm Problem     Follow up for rash on face and arm. Pt states it has mostly cleared and had only had two recent flare on his right arm.      Marquise Nixon, EMT-B

## 2024-03-12 NOTE — TELEPHONE ENCOUNTER
Prior Authorization Retail Medication Request    Medication/Dose: fexofenadine (ALLEGRA) 180 MG tablet  Diagnosis and ICD code (if different than what is on RX):    New/renewal/insurance change PA/secondary ins. PA:  Previously Tried and Failed:  see chart  Rationale:  see chart    Insurance   Primary: [facility]  Insurance ID:  [id number]

## 2024-03-12 NOTE — LETTER
3/12/2024       RE: Quan Davidson  [address]  [facility] [address]     Dear Colleague,    Thank you for referring your patient, Quan Davidson, to the [facility] [place] at [facility]. Please see a copy of my visit note below.    [facility] Dermatology Note  Encounter Date: Mar 12, 2024  Office Visit     Dermatology Problem List:  1. Flexural eczema  2. Eyelid dermatitis, allergic vs eczematous    ____________________________________________    Assessment & Plan:     #Eyelid dermatitis  Hyperpigmentation under bilateral eyes, with lichenification and flaking noted under left eye. Given intermittent episodes of pruritus throughout the day, with spontaneous resolution, suspect type I allergic reaction.   - allegra 1-2 times daily  - continue pimecrolimus as tolerated    Procedures Performed:   None    Follow-up: 3 month(s) in-person, or earlier for new or changing lesions    Staff and Resident:     Ana Leone MD  [facility] PGY-3    Norman Couch MD  ____________________________________________    CC: Derm Problem (Follow up for rash on face and arm. Pt states it has mostly cleared and had only had two recent flare on his right arm. )    HPI:  Mr. Quan Davidson is a(n) 18 year old male who presents today for follow-up  for eczema.     Twice daily hydrocortisone anti-itch cream has helped his eczema for 4-5 months. Patch testing positive for lanolin.     Areas affected is left eye, flexural areas. Tacrolimus caused burning, prescribed pimecrolimus, though still having burning.     Patient is otherwise feeling well, without additional skin concerns.     Labs Reviewed:  N/A    Physical Exam:  Vitals: There were no vitals taken for this visit.  SKIN: Focused examination of bilateral arms and face was performed.  - hyperpigmentation of bilateral lower eyelids with lichenification of  left lower eyelid  - There are hyperpigmented patches and plaques on the bilateral arms and elbow flexures.     - No other lesions of concern on areas examined.     Medications:  Current Outpatient Medications   Medication    azelastine (ASTELIN) 0.1 % nasal spray    budesonide-formoterol (SYMBICORT) 160-4.5 MCG/ACT Inhaler    desonide (DESOWEN) 0.05 % external ointment    famotidine (PEPCID) 40 MG tablet    lisinopril (ZESTRIL) 10 MG tablet    naltrexone (DEPADE/REVIA) 50 MG tablet    olopatadine (PATANOL) 0.1 % ophthalmic solution    Semaglutide-Weight Management (WEGOVY) 1.7 MG/0.75ML pen    tacrolimus (PROTOPIC) 0.1 % external ointment    pantoprazole (PROTONIX) 40 MG EC tablet     No current facility-administered medications for this visit.      Past Medical History:   Patient Active Problem List   Diagnosis    Elevated blood pressure    Pre-diabetes    Class 3 severe obesity with serious comorbidity and body mass index (BMI) of 50.0 to 59.9 in adult (H)     Past Medical History:   Diagnosis Date    Glaucoma (increased eye pressure)     Term birth of male      Traumatic brain injury (H) 2014    UPJ (ureteropelvic junction) obstruction 03/10/2015    UPJ obstruction, congenital 2014    identified on imaging for unrelated trauma       CC Vivian Gonzalez MD  [address] [place] [address] on close of this encounter.      Attestation signed by Norman Couch MD at 3/12/2024  3:04 PM:  I have personally examined this patient and agree with the resident doctor's documentation and plan of care. I have reviewed and amended the resident's note. The documentation accurately reflects my clinical observations, diagnoses, treatment and follow-up plans. Provider Time: Established 43 minutes spent on the date of the encounter doing chart review, patient visit (including history, exam, and counseling), and documentation, excluding any procedures performed.       Norman Couch MD  Dermatology  Staff

## 2024-03-15 ENCOUNTER — PATIENT OUTREACH (OUTPATIENT)
Dept: CARE COORDINATION | Facility: CLINIC | Age: 19
End: 2024-03-15

## 2024-03-15 NOTE — PROGRESS NOTES
Clinic Care Coordination Contact  Community Health Worker Follow Up    Care Gaps:     Health Maintenance Due   Topic Date Due    ASTHMA ACTION PLAN  Never done    Pneumococcal Vaccine: Pediatrics (0 to 5 Years) and At-Risk Patients (6 to 64 Years) (1 of 2 - PCV) Never done    HEPATITIS B IMMUNIZATION (1 of 3 - 19+ 3-dose series) Never done    COVID-19 Vaccine (1 - 2023-24 season) Never done    DTAP/TDAP/TD IMMUNIZATION (2 - Td or Tdap) 10/24/2023    HPV IMMUNIZATION (2 - Male 3-dose series) 10/24/2023       Care Gaps Last addressed on 3/15/2024    NEXT PCP APPT: 4/5/2024    Care Plan:   Care Plan: General       Problem: HP GENERAL PROBLEM       Long-Range Goal: I would like to work with the Bariatric Clinic to assist me with weight loss.       Start Date: 6/28/2023 Expected End Date: 2/28/2024    This Visit's Progress: 70% Recent Progress: 60%    Priority: High    Note:     Barriers: elevated BMI  Strengths: strong advocate for himself.  Patient expressed understanding of goal: yes  Action steps to achieve this goal:  1. I understand Dr. Louis has sent  referral to the Bariatric Clinic on my behalf.   2. I understand a representative from the Bariatric Clinic will contact me directly to scheduled an intake appointment.   3. I will report progress towards this goal at scheduled outreach telephone calls from my Care Coordination team.      Discussed 3/15- Next appt scheduled 4/1/2024  Discussed on 2/16/24  Discussed with sister 3/7 next appt  Discussed on 1/18/24 11/22 discussed, next PCP appt 11/28, next weight mgmt visit 1/12 and 1/16/24  Discussed - completed virtual visit  Discussed on 8/2/23                              Intervention and Education during outreach: Supportive Listening, CHW scheduled follow up CC RN call today to discuss patient's questions.    CHW spoke to Jose Daniel today for monthly CC outreach call, patient states he is doing well. [facility] continues to attend all scheduled appointments. Patient states  that he has questions, on how and where to check status of labs and appointments- CHW scheduled follow up CC RN call today to address concerns.  Jose Daniel denies any other questions and is thankful for outreach cole today.    CHW Plan: Continue with monthly outreach call to discuss, support and update CC goal progression    Next CHW outreach call: 4/15/2024    Siri EVANS  Community Health Worker  [facility] Coordination  [place] [email or web address]  [email or web address]  Office: [phone]

## 2024-03-23 NOTE — TELEPHONE ENCOUNTER
Retail Pharmacy Prior Authorization Team   Phone: [phone]    PA Initiation    Medication: FEXOFENADINE  MG PO TABS  Insurance Company: [employer] - Phone [phone] Fax [phone]  Pharmacy Filling the Rx: [id number] - [place], [place] - [address] AT [facility] OF [address]  Filling Pharmacy Phone: [phone]  Filling Pharmacy Fax:    Start Date: 3/23/2024

## 2024-03-24 ENCOUNTER — MYC MEDICAL ADVICE (OUTPATIENT)
Dept: ENDOCRINOLOGY | Facility: CLINIC | Age: 19
End: 2024-03-24
Payer: COMMERCIAL

## 2024-03-24 DIAGNOSIS — E66.01 CLASS 3 SEVERE OBESITY WITH SERIOUS COMORBIDITY AND BODY MASS INDEX (BMI) OF 50.0 TO 59.9 IN ADULT, UNSPECIFIED OBESITY TYPE (H): ICD-10-CM

## 2024-03-24 DIAGNOSIS — E66.813 CLASS 3 SEVERE OBESITY WITH SERIOUS COMORBIDITY AND BODY MASS INDEX (BMI) OF 50.0 TO 59.9 IN ADULT, UNSPECIFIED OBESITY TYPE (H): ICD-10-CM

## 2024-03-25 NOTE — TELEPHONE ENCOUNTER
Sent patient MyChart informing him of insurance decision, and how to utilize [facility] to obtain medication with lower out of pocket cost.    Imani SINGH RN

## 2024-03-25 NOTE — TELEPHONE ENCOUNTER
PRIOR AUTHORIZATION DENIED    Medication: FEXOFENADINE  MG PO TABS  Insurance Company: [employer] - Phone [phone] Fax [phone]  Denial Date: 3/23/2024  Denial Reason(s):             Appeal Information:         Patient Notified: No

## 2024-03-27 ENCOUNTER — HOSPITAL ENCOUNTER (OUTPATIENT)
Dept: CT IMAGING | Facility: CLINIC | Age: 19
Discharge: HOME | End: 2024-03-27
Attending: OTOLARYNGOLOGY
Payer: COMMERCIAL

## 2024-03-27 ENCOUNTER — HOSPITAL ENCOUNTER (OUTPATIENT)
Dept: RADIOLOGY | Facility: CLINIC | Age: 19
Discharge: HOME | End: 2024-03-27
Attending: OTOLARYNGOLOGY
Payer: COMMERCIAL

## 2024-03-27 DIAGNOSIS — K21.9 LPRD (LARYNGOPHARYNGEAL REFLUX DISEASE): ICD-10-CM

## 2024-03-27 DIAGNOSIS — R09.81 CONGESTION OF PARANASAL SINUS: ICD-10-CM

## 2024-03-27 PROCEDURE — 70486 CT MAXILLOFACIAL W/O DYE: CPT

## 2024-03-27 PROCEDURE — 250N000013 HC RX MED GY IP 250 OP 250 PS 637: Performed by: OTOLARYNGOLOGY

## 2024-03-27 PROCEDURE — 74221 X-RAY XM ESOPHAGUS 2CNTRST: CPT

## 2024-03-27 RX ADMIN — ANTACID/ANTIFLATULENT 4 G: 380; 550; 10; 10 GRANULE, EFFERVESCENT ORAL at 12:35

## 2024-03-29 RX ORDER — SEMAGLUTIDE 1.7 MG/.75ML
1.7 INJECTION, SOLUTION SUBCUTANEOUS WEEKLY
Qty: 3 ML | Refills: 3 | Status: SHIPPED | OUTPATIENT
Start: 2024-03-29 | End: 2024-07-03

## 2024-03-29 NOTE — TELEPHONE ENCOUNTER
Patient to continue at 1.7 mg dose per Nirmala Main PAC.  Patient notified.  Rx refill sent to pharmacy.

## 2024-04-01 ENCOUNTER — PATIENT OUTREACH (OUTPATIENT)
Dept: CARE COORDINATION | Facility: CLINIC | Age: 19
End: 2024-04-01

## 2024-04-01 ENCOUNTER — OFFICE VISIT (OUTPATIENT)
Dept: SURGERY | Facility: CLINIC | Age: 19
End: 2024-04-01
Attending: OTOLARYNGOLOGY
Payer: COMMERCIAL

## 2024-04-01 VITALS
SYSTOLIC BLOOD PRESSURE: 140 MMHG | WEIGHT: 287 LBS | BODY MASS INDEX: 46.12 KG/M2 | DIASTOLIC BLOOD PRESSURE: 80 MMHG | HEIGHT: 66 IN

## 2024-04-01 DIAGNOSIS — K21.9 LPRD (LARYNGOPHARYNGEAL REFLUX DISEASE): ICD-10-CM

## 2024-04-01 LAB
EXPTIME-%CHANGE-CHLG: 3 %
EXPTIME-CHLG: 4.91 SEC
EXPTIME-PRE: 4.73 SEC
FEF2575-%CHANGE-CHLG: -2 %
FEF2575-%PRED-CHLG: 137 %
FEF2575-%PRED-POST: 142 %
FEF2575-%PRED-PRE: 140 %
FEF2575-POST: 5.85 L/SEC
FEF2575-PRE: 5.79 L/SEC
FEF2575-PRED: 4.12 L/SEC
FEFMAX-%PRED-PRE: 107 %
FEFMAX-PRE: 9.48 L/SEC
FEFMAX-PRED: 8.8 L/SEC
FEV1-%PRED-PRE: 129 %
FEV1-PRE: 4.63 L
FEV1FEV6-PRE: 88 %
FEV1FEV6-PRED: 84 %
FEV1FVC-PRE: 88 %
FEV1FVC-PRED: 88 %
FIFMAX-%CHANGE-CHLG: -5 %
FIFMAX-CHLG: 5.71 L/SEC
FIFMAX-PRE: 6.05 L/SEC
FVC-%PRED-PRE: 128 %
FVC-PRE: 5.26 L
FVC-PRED: 4.09 L

## 2024-04-01 PROCEDURE — 99213 OFFICE O/P EST LOW 20 MIN: CPT | Performed by: SURGERY

## 2024-04-01 NOTE — LETTER
[facility]  Patient Centered Plan of Care  About Me:        Patient Name:  Quan Sky,     Here is a copy of your Care Plan with the goal we are supporting you with at this time. Please let me know if I can help with any else.     Thanks,     Dave Myhre, RN   CCC RN  [phone]    YOB: 2005  Age:         18 year old   Thai MRN:    [id number] Telephone Information:  Home Phone [phone]   Home Phone Not on file.   Mobile [phone]       Address:  [address] [place], [place] [address] Email address:  [email or web address]      Emergency Contact(s)    Name Relationship Lgl Grd Work Phone Home Phone Mobile Phone   WILMAR SORENSEN Sister    [phone]           Primary language:  English     needed? No   [employer] [place] [employer]:  [phone] op. 1  Other communication barriers:None    Preferred Method of Communication:     Current living arrangement: I live in a private home with family    Mobility Status/ Medical Equipment: Independent        Health Maintenance  Health Maintenance Reviewed: Due/Overdue   Health Maintenance Due   Topic Date Due    ASTHMA ACTION PLAN  Never done    Pneumococcal Vaccine: Pediatrics (0 to 5 Years) and At-Risk Patients (6 to 64 Years) (1 of 2 - PCV) Never done    HEPATITIS B IMMUNIZATION (1 of 3 - 19+ 3-dose series) Never done    COVID-19 Vaccine (1 - 2023-24 season) Never done    DTAP/TDAP/TD IMMUNIZATION (2 - Td or Tdap) 10/24/2023    HPV IMMUNIZATION (2 - Male 3-dose series) 10/24/2023          My Access Plan  Medical Emergency 911   Primary Clinic Line [facility] - [phone]   24 Hour Appointment Line [phone] or  [phone]) (toll-free)   24 Hour Nurse Line [phone] (toll-free)   Preferred Urgent Care [facility] [phone]     [facility]  [phone]     Preferred Pharmacy [id number] - [place], [place] - [address]     Behavioral Health Crisis Line The National Suicide Prevention Lifeline at 1-465.933.8826 or Text/Call 988           My Care Team Members  Patient Care Team         Relationship Specialty Notifications Start End    Nidhi Little DO PCP - General Family Medicine  7/9/23     Phone: [phone] Fax: [phone]         [address] [facility] [address]    Edyta Hawkins, NP Nurse Practitioner Nurse Practitioner  3/3/15     ref to nephro    Phone: [phone] Fax: [phone]         [facility] [address] [facility] [address]    Pili Hernandez MD MD Pediatric Nephrology  3/3/15     Phone: [phone] Fax: [phone]         [address] [address]    Edyta Hawkins NP  Nurse Practitioner  6/10/23     Merged    Phone: [phone] Fax: [phone]         [facility] [address] [facility] [address]    Vivian Gonzalez MD MD Family Medicine  6/12/23     Referring to DERM    Phone: [phone] Fax: [phone]         [address] [facility] [address]    Jake Lundberg MD MD Dermapathology  6/12/23     Phone: [phone] Fax: [phone]         [address] [address]    Bebeto Vinson MD MD Allergy & Immunology  6/15/23     Phone: [phone] Fax: [phone]         [address] [place] [address]    Lashawn Jhaveri MD MD Dermatology  6/15/23     referring to Allergy    Phone: [phone] Fax: [phone]         [address] [facility] [address] [place] [address]    Nidhi Little DO Assigned PCP   6/15/23     Phone: [phone] Fax: [phone] [address] [facility] [address]    Enrrique Ibarra MD MD Nephrology  6/27/23     Phone: [phone] Fax: [phone] [address] [facility] [address]    Myhre, David J, RN Lead Care Coordinator Primary Care - CC Admissions 6/27/23      Phone: [phone]         Nidhi Little DO Referring Physician Family Medicine  7/10/23     Referring Physician to eye    Phone: [phone] Fax: [phone]         [address] [place] [address]    Kailyn Matias MD MD Ophthalmology  7/10/23     Phone: [phone] Fax: [phone]         [address] [facility] [address]    Veronica Abbasi Chi, OD MD Optometry  7/18/23     Phone: [phone] Fax: [phone]         [address] [facility] [address]    Shaina Saunders CHW Community Health Worker  [facility] 8/2/23     Norman Couch MD MD Dermatology  8/30/23     Phone: [phone] Fax: [phone]         [address] [place] [address]    Uche Islas MD MD Otolaryngology  9/11/23     Phone: [phone] Fax: [phone]         [address] [place] [address]    Enrrique Ibarra MD Assigned Nephrology Provider   9/16/23     Phone: [phone] Fax: [phone]         [address] [place] [address]    Cierra Kyle, PharmD MTM Pharamcist Pharmacist  1/23/24     Phone: [phone] Fax: [phone]         [address] [place] [address]    Bebeto Vinson MD Assigned Surgical Provider   1/25/24     Phone: [phone] Fax: [phone]         [address] [place] [address]    Cierra Kyle, PharmD Assigned MTM Pharmacist   2/2/24     Phone: [phone] Fax: [phone]         [address] [place] [address]    Bloch, Lauren Turner, [facility] Pharmacist Pharmacist  2/14/24     Phone: [phone]          [address] [place] [address]                My Care Plans  Self Management and Treatment Plan    Care Plan  Care Plan: General       Problem: HP GENERAL PROBLEM       Long-Range Goal: I would like to work with the Bariatric Clinic to assist me with weight loss.       Start Date: 6/28/2023 Expected End Date: 2/28/2024    This Visit's Progress: 70% Recent Progress: 60%    Priority: High    Note:     Barriers: elevated  BMI  Strengths: strong advocate for himself.  Patient expressed understanding of goal: yes  Action steps to achieve this goal:  1. I understand Dr. Louis has sent  referral to the Bariatric Clinic on my behalf.   2. I understand a representative from the Bariatric Clinic will contact me directly to scheduled an intake appointment.   3. I will report progress towards this goal at scheduled outreach telephone calls from my Care Coordination team.      Discussed 3/15- Next appt scheduled 4/1/2024  Discussed on 2/16/24  Discussed with sister 3/7 next appt  Discussed on 1/18/24 11/22 discussed, next PCP appt 11/28, next weight mgmt visit 1/12 and 1/16/24  Discussed - completed virtual visit  Discussed on 8/2/23                              Action Plans on File:                       Advance Care Plans/Directives:   Advanced Care Plan/Directives on file:   No    Discussed with patient/caregiver(s): No data recorded           My Medical and Care Information  Problem List   Patient Active Problem List   Diagnosis    Elevated blood pressure    Pre-diabetes    Class 3 severe obesity with serious comorbidity and body mass index (BMI) of 50.0 to 59.9 in adult (H)      Current Medications and Allergies:  See printed Medication Report.    Care Coordination Start Date: 6/26/2023   Frequency of Care Coordination: monthly, more frequently as needed     Form Last Updated: 04/01/2024

## 2024-04-01 NOTE — LETTER
2024         RE: Quan Davidson  [address]  [facility] [place] [facility] [address]        Dear Colleague,    Thank you for referring your patient, Quan Davidson, to the [facility] SURGERY CLINIC AND BARIATRICS CARE [place]. Please see a copy of my visit note below.    HPI: Quan Davidson is a 18 year old male referred to see me by Nidhi Little for evaluation of gastroesophageal reflux disease.  He notes  a history of mild throat irritation with phlegm buildup and a globus sensation.  This is worse in the morning.  He has a deviated septum for which she has seen ENT.  He does sleep  propped up with pillows and notes that the mucus buildup in the morning is significantly better.  He denies any esophageal burning or regurgitation.  Denies any nighttime cough.  GERD HR Q OL is 2 for mild throat irritation.  He has been on Pepcid twice daily for the past 5 months and states there is mild improvement.  He is unable to take the Protonix prescribed by another physician secondary to having a solitary kidney.    Allergies:Ibuprofen    Past Medical History:   Diagnosis Date     Glaucoma (increased eye pressure)      Term birth of male       Traumatic brain injury (H) 2014     UPJ (ureteropelvic junction) obstruction 03/10/2015     UPJ obstruction, congenital 2014    identified on imaging for unrelated trauma       Past Surgical History:   Procedure Laterality Date     NO HISTORY OF SURGERY         CURRENT MEDS:    Current Outpatient Medications:      azelastine (ASTELIN) 0.1 % nasal spray, 2 sprays each nostril 1-2x daily as needed for nasal congestion (use nightly for first 2 week), Disp: 30 mL, Rfl: 11     budesonide-formoterol (SYMBICORT) 160-4.5 MCG/ACT Inhaler, Inhale 2 puffs into the lungs 2 times daily, Disp: 10.2 g, Rfl: 3     desonide (DESOWEN) 0.05 % external ointment, Apply topically 2 times daily, Disp: , Rfl:      famotidine (PEPCID) 40 MG tablet, Take 1  tablet (40 mg) by mouth 2 times daily, Disp: 90 tablet, Rfl: 1     fexofenadine (ALLEGRA) 180 MG tablet, Use up to 2 tablets daily for itching, Disp: 60 tablet, Rfl: 3     lisinopril (ZESTRIL) 10 MG tablet, Take 1 tablet (10 mg) by mouth daily for 360 days, Disp: 90 tablet, Rfl: 3     naltrexone (DEPADE/REVIA) 50 MG tablet, Take 1/2 tablet once daily 1-2 hours prior to worst cravings for 1 week, then increase to 1 tablet daily as directed if tolerating, Disp: 30 tablet, Rfl: 3     olopatadine (PATANOL) 0.1 % ophthalmic solution, Place 1 drop into both eyes 2 times daily, Disp: 5 mL, Rfl: 1     Semaglutide-Weight Management (WEGOVY) 1.7 MG/0.75ML pen, Inject 1.7 mg Subcutaneous once a week, Disp: 3 mL, Rfl: 3     pantoprazole (PROTONIX) 40 MG EC tablet, Take 1 tablet (40 mg) by mouth daily for 90 days (Patient not taking: Reported on 3/12/2024), Disp: 90 tablet, Rfl: 0    Family History   Problem Relation Age of Onset     Diabetes Father      Hypertension Father         reports that he has never smoked. He has been exposed to tobacco smoke. He has never used smokeless tobacco.    Review of Systems:  The 12 system review is within normal limits except for as mentioned above.  General ROS: No complaints or constitutional symptoms  Ophthalmic ROS: No complaints of visual changes  Skin: No complaints or symptoms   Endocrine: No complaints or symptoms  Hematologic/Lymphatic: No symptoms or complaints  Psychiatric: No symptoms or complaints  Respiratory ROS: no cough, shortness of breath, or wheezing  Cardiovascular ROS: no chest pain or dyspnea on exertion  Gastrointestinal ROS: As per HPI  Genito-Urinary ROS: no dysuria, trouble voiding, or hematuria  Musculoskeletal ROS: no joint or muscle pain  Neurological ROS: no TIA or stroke symptoms      EXAM:  There were no vitals taken for this visit.  GENERAL: Well developed male, No acute distress, pleasant and conversant   EYES: Pupils equal, round and reactive, no scleral  icterus  ABDOMEN: Soft, non-tender, no masses, non-distended  SKIN: Pink, warm and dry, no obvious rashes or lesions   NEURO:No focal deficits, ambulatory  MUSCULOSKELETAL:No obvious deformities, no swelling, normal appearing      LABS:  Lab Results   Component Value Date    WBC 10.9 03/08/2024    HGB 15.3 03/08/2024    HCT 44.7 03/08/2024    MCV 81 03/08/2024     03/08/2024     INR/Prothrombin Time  @LABRCNTIP(NA,K,CL,co2,bun,creatinine,labglom,glucose,calcium)@  Lab Results   Component Value Date    ALT 22 06/10/2023    AST 28 06/10/2023    ALKPHOS 83 06/10/2023       IMAGES:   Relevant images were reviewed and discussed with the patient.  Notable findings were: Esophagram images reviewed demonstrates reflux    Assessment/Plan:   Quan Davidson is a 18 year old male with signs and symptoms consistent with mild gastroesophageal reflux disease.  I have explained the pathophysiology of GERD in detail as well as the surgical versus non-operative management strategies.      At this point, given his mild symptoms, we will continue with lifestyle medication management strategies.  The esophagram does demonstrate some reflux and there is likely hiatal hernia component.  After discussion, plan will be for continued observation and if his symptoms worsen he will follow-up with me for an endoscopy to establish baseline and sizing of his hiatal hernia and biopsies of his GE junction.  He is happy with the plan will follow-up accordingly.    Aldair Steiner DO [facility]  [phone]  [facility] Department of Surgery      Again, thank you for allowing me to participate in the care of your patient.        Sincerely,        Aldair Steiner DO

## 2024-04-01 NOTE — PROGRESS NOTES
HPI: Quan Davidson is a 18 year old male referred to see me by Nidhi Little for evaluation of gastroesophageal reflux disease.  He notes  a history of mild throat irritation with phlegm buildup and a globus sensation.  This is worse in the morning.  He has a deviated septum for which she has seen ENT.  He does sleep  propped up with pillows and notes that the mucus buildup in the morning is significantly better.  He denies any esophageal burning or regurgitation.  Denies any nighttime cough.  GERD HR Q OL is 2 for mild throat irritation.  He has been on Pepcid twice daily for the past 5 months and states there is mild improvement.  He is unable to take the Protonix prescribed by another physician secondary to having a solitary kidney.    Allergies:Ibuprofen    Past Medical History:   Diagnosis Date    Glaucoma (increased eye pressure)     Term birth of male      Traumatic brain injury (H) 2014    UPJ (ureteropelvic junction) obstruction 03/10/2015    UPJ obstruction, congenital 2014    identified on imaging for unrelated trauma       Past Surgical History:   Procedure Laterality Date    NO HISTORY OF SURGERY         CURRENT MEDS:    Current Outpatient Medications:     azelastine (ASTELIN) 0.1 % nasal spray, 2 sprays each nostril 1-2x daily as needed for nasal congestion (use nightly for first 2 week), Disp: 30 mL, Rfl: 11    budesonide-formoterol (SYMBICORT) 160-4.5 MCG/ACT Inhaler, Inhale 2 puffs into the lungs 2 times daily, Disp: 10.2 g, Rfl: 3    desonide (DESOWEN) 0.05 % external ointment, Apply topically 2 times daily, Disp: , Rfl:     famotidine (PEPCID) 40 MG tablet, Take 1 tablet (40 mg) by mouth 2 times daily, Disp: 90 tablet, Rfl: 1    fexofenadine (ALLEGRA) 180 MG tablet, Use up to 2 tablets daily for itching, Disp: 60 tablet, Rfl: 3    lisinopril (ZESTRIL) 10 MG tablet, Take 1 tablet (10 mg) by mouth daily for 360 days, Disp: 90 tablet, Rfl: 3    naltrexone  (DEPADE/REVIA) 50 MG tablet, Take 1/2 tablet once daily 1-2 hours prior to worst cravings for 1 week, then increase to 1 tablet daily as directed if tolerating, Disp: 30 tablet, Rfl: 3    olopatadine (PATANOL) 0.1 % ophthalmic solution, Place 1 drop into both eyes 2 times daily, Disp: 5 mL, Rfl: 1    Semaglutide-Weight Management (WEGOVY) 1.7 MG/0.75ML pen, Inject 1.7 mg Subcutaneous once a week, Disp: 3 mL, Rfl: 3    pantoprazole (PROTONIX) 40 MG EC tablet, Take 1 tablet (40 mg) by mouth daily for 90 days (Patient not taking: Reported on 3/12/2024), Disp: 90 tablet, Rfl: 0    Family History   Problem Relation Age of Onset    Diabetes Father     Hypertension Father         reports that he has never smoked. He has been exposed to tobacco smoke. He has never used smokeless tobacco.    Review of Systems:  The 12 system review is within normal limits except for as mentioned above.  General ROS: No complaints or constitutional symptoms  Ophthalmic ROS: No complaints of visual changes  Skin: No complaints or symptoms   Endocrine: No complaints or symptoms  Hematologic/Lymphatic: No symptoms or complaints  Psychiatric: No symptoms or complaints  Respiratory ROS: no cough, shortness of breath, or wheezing  Cardiovascular ROS: no chest pain or dyspnea on exertion  Gastrointestinal ROS: As per HPI  Genito-Urinary ROS: no dysuria, trouble voiding, or hematuria  Musculoskeletal ROS: no joint or muscle pain  Neurological ROS: no TIA or stroke symptoms      EXAM:  There were no vitals taken for this visit.  GENERAL: Well developed male, No acute distress, pleasant and conversant   EYES: Pupils equal, round and reactive, no scleral icterus  ABDOMEN: Soft, non-tender, no masses, non-distended  SKIN: Pink, warm and dry, no obvious rashes or lesions   NEURO:No focal deficits, ambulatory  MUSCULOSKELETAL:No obvious deformities, no swelling, normal appearing      LABS:  Lab Results   Component Value Date    WBC 10.9 03/08/2024    HGB  15.3 03/08/2024    HCT 44.7 03/08/2024    MCV 81 03/08/2024     03/08/2024     INR/Prothrombin Time  @LABRCNTIP(NA,K,CL,co2,bun,creatinine,labglom,glucose,calcium)@  Lab Results   Component Value Date    ALT 22 06/10/2023    AST 28 06/10/2023    ALKPHOS 83 06/10/2023       IMAGES:   Relevant images were reviewed and discussed with the patient.  Notable findings were: Esophagram images reviewed demonstrates reflux    Assessment/Plan:   Quandelroy Davidson is a 18 year old male with signs and symptoms consistent with mild gastroesophageal reflux disease.  I have explained the pathophysiology of GERD in detail as well as the surgical versus non-operative management strategies.      At this point, given his mild symptoms, we will continue with lifestyle medication management strategies.  The esophagram does demonstrate some reflux and there is likely hiatal hernia component.  After discussion, plan will be for continued observation and if his symptoms worsen he will follow-up with me for an endoscopy to establish baseline and sizing of his hiatal hernia and biopsies of his GE junction.  He is happy with the plan will follow-up accordingly.    Aldair Steiner,  FACS  [phone]  [facility] Department of Surgery

## 2024-04-01 NOTE — PROGRESS NOTES
Clinic Care Coordination Contact  Care Coordination Clinician Chart Review    Situation: Patient chart reviewed by Care Coordinator.       Background: Care Coordination Program started: 6/26/2023. Initial assessment completed and patient-centered care plan(s) were developed with participation from patient. Lead CC handed patient off to CHW for continued outreaches.       Assessment: Per chart review, patient outreach completed by CC CHW on 3/15/24.  Patient is actively working to accomplish goal(s). Patient's goal(s) appropriate and relevant at this time. Patient is due for updated Plan of Care.  Assessments will be completed annually or as needed/with change of patient status.      Care Plan: General       Problem: HP GENERAL PROBLEM       Long-Range Goal: I would like to work with the Bariatric Clinic to assist me with weight loss.       Start Date: 6/28/2023 Expected End Date: 2/28/2024    This Visit's Progress: 70% Recent Progress: 60%    Priority: High    Note:     Barriers: elevated BMI  Strengths: strong advocate for himself.  Patient expressed understanding of goal: yes  Action steps to achieve this goal:  1. I understand Dr. Louis has sent  referral to the Bariatric Clinic on my behalf.   2. I understand a representative from the Bariatric Clinic will contact me directly to scheduled an intake appointment.   3. I will report progress towards this goal at scheduled outreach telephone calls from my Care Coordination team.      Discussed 3/15- Next appt scheduled 4/1/2024  Discussed on 2/16/24  Discussed with sister 3/7 next appt  Discussed on 1/18/24 11/22 discussed, next PCP appt 11/28, next weight mgmt visit 1/12 and 1/16/24  Discussed - completed virtual visit  Discussed on 8/2/23                                 Plan/Recommendations: The patient will continue working with Care Coordination to achieve goal(s) as above. CHW will continue outreaches at minimum every 30 days and will involve Lead CC as needed  or if patient is ready to move to Maintenance. Lead CC will continue to monitor CHW outreaches and patient's progress to goal(s) every 6 weeks.     Plan of Care updated and sent to patient: Yes, via [email or web address]

## 2024-04-02 ENCOUNTER — VIRTUAL VISIT (OUTPATIENT)
Dept: CARDIOLOGY | Facility: CLINIC | Age: 19
End: 2024-04-02
Payer: COMMERCIAL

## 2024-04-02 VITALS — BODY MASS INDEX: 46.32 KG/M2 | WEIGHT: 287 LBS

## 2024-04-02 DIAGNOSIS — L29.9 ITCHING: ICD-10-CM

## 2024-04-02 DIAGNOSIS — E66.01 CLASS 3 SEVERE OBESITY WITH SERIOUS COMORBIDITY AND BODY MASS INDEX (BMI) OF 50.0 TO 59.9 IN ADULT, UNSPECIFIED OBESITY TYPE (H): Primary | ICD-10-CM

## 2024-04-02 DIAGNOSIS — E66.813 CLASS 3 SEVERE OBESITY WITH SERIOUS COMORBIDITY AND BODY MASS INDEX (BMI) OF 50.0 TO 59.9 IN ADULT, UNSPECIFIED OBESITY TYPE (H): Primary | ICD-10-CM

## 2024-04-02 DIAGNOSIS — J30.2 SEASONAL ALLERGIES: ICD-10-CM

## 2024-04-02 DIAGNOSIS — K21.9 GERD WITHOUT ESOPHAGITIS: ICD-10-CM

## 2024-04-02 DIAGNOSIS — L20.9 ATOPIC DERMATITIS, UNSPECIFIED TYPE: ICD-10-CM

## 2024-04-02 RX ORDER — PIMECROLIMUS 10 MG/G
CREAM TOPICAL 2 TIMES DAILY
COMMUNITY
End: 2024-05-07

## 2024-04-02 ASSESSMENT — PAIN SCALES - GENERAL: PAINLEVEL: NO PAIN (0)

## 2024-04-02 NOTE — PROGRESS NOTES
Medication Therapy Management (MTM) Encounter    ASSESSMENT:                            Medication Adherence/Access: No issues identified    Weight Management:   Would benefit from trial off naltrexone to see if necessary within regimen, to continue solely Wegovy.     Atopic Dermatitis/Pruritus/Allergies:  Fexofenadine denial looks as if needing to trial cetirizine and loratadine first, doesn't appear denial is related to dosing. Pharmacist will reach out to Dermatologist to see thoughts about switching RX to cetirizine.     GERD:   Stable for now. Appears was discussion about PPI in future, but as stable will hold off for now. If weight loss continues to improve symptoms, can look at stepping down on famotidine dose in future as well.     PLAN:                            Stop naltrexone. Continue Wegovy at 1.7 mg weekly.   Pharmacist to await to hear from Dermatology regarding switch RX from fexofenadine to cetirizine 10-20 mg daily    Follow-up: Return in about 3 months (around 7/2/2024) for Medication Therapy Management Pharmacist Visit.    SUBJECTIVE/OBJECTIVE:                          Jose Daniel Davidson is a 18 year old male contacted via secure video for a follow-up visit.       Reason for visit: Wegovy check in.    Allergies/ADRs: Reviewed in chart  Past Medical History: Reviewed in chart  Tobacco: He reports that he has never smoked. He has been exposed to tobacco smoke. He has never used smokeless tobacco.  Alcohol: not currently using    Medication Adherence/Access: no issues reported    Obesity   Weight Management:   Wegovy 1.7 mg once weekly    Naltrexone 50 mg daily     Followed for Return Medical Weight Management. Patient is experiencing the follow side effects: None. Reports that the first month on Wegovy, had nausea that was bothersome at times, but this 2nd month now has been much improved. Has continued taking naltrexone while on Wegovy. He finds that he has more energy during the day.  "  Nutrition/Eating Behaviors: Patient reports getting in 2 regular meals + 2 snack sized meals per day. Typically doesn't take breakfast.     Exercise/Activity: Patient reports hasn't been going to the gym recently, but typically had been doing 2-3 times per week at gym - walking and lifting weights. He does find with walking, that walking feels better now with weight loss and pushes him to want to walk more.    Medications Tried/Failed: Saxenda: ineffective.     Current weight today: 287 lbs 0 oz    Wt Readings from Last 4 Encounters:   04/02/24 130.2 kg (287 lb)   04/01/24 130.2 kg (287 lb)   03/08/24 134.9 kg (297 lb 8 oz)   03/07/24 136.1 kg (300 lb)     Estimated body mass index is 46.32 kg/m  as calculated from the following:    Height as of 4/1/24: 1.676 m (5' 6\").    Weight as of this encounter: 130.2 kg (287 lb).    Hemoglobin A1C   Date Value Ref Range Status   09/06/2023 6.0 (H) <5.7 % Final     Comment:     Normal <5.7%   Prediabetes 5.7-6.4%    Diabetes 6.5% or higher     Note: Adopted from ADA consensus guidelines.        Wt Readings from Last 4 Encounters:   04/02/24 130.2 kg (287 lb)   04/01/24 130.2 kg (287 lb)   03/08/24 134.9 kg (297 lb 8 oz)   03/07/24 136.1 kg (300 lb)     Estimated body mass index is 46.32 kg/m  as calculated from the following:    Height as of 4/1/24: 1.676 m (5' 6\").    Weight as of this encounter: 130.2 kg (287 lb).    Atopic Dermatitis/Pruritus/Allergies:   Pimecrolimus 1% cream - not taking currently   Fexofenadine 180-360 mg daily - not taking currently     Last follow up with Dermatology, Dr. Couch, 3/12/2024. Continues to report flaking under left eye. Also reports intermittent episodes of pruritus and tearing/watering of eye throughout the day. Wasn't able to start fexofenadine due to Prior Authorization denial and out of pocket over-the-counter expensive. Had found the Elidel helpful for itching/dermatitis symptoms but difficult to apply and keep on with continued eye " watering. Reports had previously been on cetirizine, think was helpful but can't remember.     GERD:   Famotidine 40 mg twice daily   Patient reports no current symptoms.   Patient feels that current regimen is effective.    Today's Vitals: Wt 130.2 kg (287 lb)   BMI 46.32 kg/m    ----------------  I spent 20 minutes with this patient today. All changes were made via collaborative practice agreement with Nirmala Torre PA-C. A copy of the visit note was provided to the patient's provider(s).    A summary of these recommendations was sent via [employer].    Lauren Bloch, PharmD, BCACP   Medication Therapy Management Pharmacist   [facility]    Telemedicine Visit Details  Type of service:  Video Conference via [employer]  Start Time:  1:30 PM  End Time:  1:50 PM     Medication Therapy Recommendations  Atopic dermatitis, unspecified type    Current Medication: fexofenadine (ALLEGRA) 180 MG tablet   Rationale: Cannot afford medication product - Cost - Adherence   Recommendation: Change Medication - cetirizine 10 MG tablet   Status: Contact Provider - Awaiting Response         Class 3 severe obesity with serious comorbidity and body mass index (BMI) of 50.0 to 59.9 in adult (H)    Current Medication: naltrexone (DEPADE/REVIA) 50 MG tablet (Discontinued)   Rationale: No medical indication at this time - Unnecessary medication therapy - Indication   Recommendation: Discontinue Medication   Status: Accepted per CPA

## 2024-04-02 NOTE — PATIENT INSTRUCTIONS
"Recommendations from today's MTM visit:                                                    MTM (medication therapy management) is a service provided by a clinical pharmacist designed to help you get the most of out of your medicines.      Stop naltrexone. Continue Wegovy at 1.7 mg weekly.   Pharmacist to await to hear from Dermatology regarding switch RX from fexofenadine to cetirizine 10-20 mg daily    Follow-up: Return in about 3 months (around 7/2/2024) for Medication Therapy Management Pharmacist Visit.    It was great speaking with you today.  I value your experience and would be very thankful for your time in providing feedback in our clinic survey. In the next few days, you may receive an email or text message from [facility] with a link to a survey related to your  clinical pharmacist.\"     To schedule another MTM appointment, please call the clinic directly or you may call the MTM scheduling line at [phone] or toll-free at [phone].     My Clinical Pharmacist's contact information:                                                      Please feel free to contact me with any questions or concerns you have.      Lauren Bloch, PharmD, BCACP   Medication Therapy Management Pharmacist   [facility]       "

## 2024-04-02 NOTE — LETTER
4/2/2024      RE: Quan Davidson  [address]  [place] [address]       Dear Colleague,    Thank you for the opportunity to participate in the care of your patient, Quan Davidson, at the [facility] [place] [facility]. Please see a copy of my visit note below.    Medication Therapy Management (MTM) Encounter    ASSESSMENT:                            Medication Adherence/Access: No issues identified    Weight Management:   Would benefit from trial off naltrexone to see if necessary within regimen, to continue solely Wegovy.     Atopic Dermatitis/Pruritus/Allergies:  Fexofenadine denial looks as if needing to trial cetirizine and loratadine first, doesn't appear denial is related to dosing. Pharmacist will reach out to Dermatologist to see thoughts about switching RX to cetirizine.     GERD:   Stable for now. Appears was discussion about PPI in future, but as stable will hold off for now. If weight loss continues to improve symptoms, can look at stepping down on famotidine dose in future as well.     PLAN:                            Stop naltrexone. Continue Wegovy at 1.7 mg weekly.   Pharmacist to await to hear from Dermatology regarding switch RX from fexofenadine to cetirizine 10-20 mg daily    Follow-up: Return in about 3 months (around 7/2/2024) for Medication Therapy Management Pharmacist Visit.    SUBJECTIVE/OBJECTIVE:                          Jose Daniel Davidson is a 18 year old male contacted via secure video for a follow-up visit.       Reason for visit: Wegovy check in.    Allergies/ADRs: Reviewed in chart  Past Medical History: Reviewed in chart  Tobacco: He reports that he has never smoked. He has been exposed to tobacco smoke. He has never used smokeless tobacco.  Alcohol: not currently using    Medication Adherence/Access: no issues reported    Obesity  Weight Management:   Wegovy 1.7 mg once weekly    Naltrexone 50  "mg daily     Followed for Return Medical Weight Management. Patient is experiencing the follow side effects: None. Reports that the first month on Wegovy, had nausea that was bothersome at times, but this 2nd month now has been much improved. Has continued taking naltrexone while on Wegovy. He finds that he has more energy during the day.   Nutrition/Eating Behaviors: Patient reports getting in 2 regular meals + 2 snack sized meals per day. Typically doesn't take breakfast.     Exercise/Activity: Patient reports hasn't been going to the gym recently, but typically had been doing 2-3 times per week at gym - walking and lifting weights. He does find with walking, that walking feels better now with weight loss and pushes him to want to walk more.    Medications Tried/Failed: Saxenda: ineffective.     Current weight today: 287 lbs 0 oz    Wt Readings from Last 4 Encounters:   04/02/24 130.2 kg (287 lb)   04/01/24 130.2 kg (287 lb)   03/08/24 134.9 kg (297 lb 8 oz)   03/07/24 136.1 kg (300 lb)     Estimated body mass index is 46.32 kg/m  as calculated from the following:    Height as of 4/1/24: 1.676 m (5' 6\").    Weight as of this encounter: 130.2 kg (287 lb).    Hemoglobin A1C   Date Value Ref Range Status   09/06/2023 6.0 (H) <5.7 % Final     Comment:     Normal <5.7%   Prediabetes 5.7-6.4%    Diabetes 6.5% or higher     Note: Adopted from ADA consensus guidelines.        Wt Readings from Last 4 Encounters:   04/02/24 130.2 kg (287 lb)   04/01/24 130.2 kg (287 lb)   03/08/24 134.9 kg (297 lb 8 oz)   03/07/24 136.1 kg (300 lb)     Estimated body mass index is 46.32 kg/m  as calculated from the following:    Height as of 4/1/24: 1.676 m (5' 6\").    Weight as of this encounter: 130.2 kg (287 lb).    Atopic Dermatitis/Pruritus/Allergies:   Pimecrolimus 1% cream - not taking currently   Fexofenadine 180-360 mg daily - not taking currently     Last follow up with Dermatology, Dr. Couch, 3/12/2024. Continues to report " flaking under left eye. Also reports intermittent episodes of pruritus and tearing/watering of eye throughout the day. Wasn't able to start fexofenadine due to Prior Authorization denial and out of pocket over-the-counter expensive. Had found the Elidel helpful for itching/dermatitis symptoms but difficult to apply and keep on with continued eye watering. Reports had previously been on cetirizine, think was helpful but can't remember.     GERD:   Famotidine 40 mg twice daily   Patient reports no current symptoms.   Patient feels that current regimen is effective.    Today's Vitals: Wt 130.2 kg (287 lb)   BMI 46.32 kg/m    ----------------  I spent 20 minutes with this patient today. All changes were made via collaborative practice agreement with Nirmala Torre PA-C. A copy of the visit note was provided to the patient's provider(s).    A summary of these recommendations was sent via [employer].    Lauren Bloch, PharmD, BCACP   Medication Therapy Management Pharmacist   [facility]    Telemedicine Visit Details  Type of service:  Video Conference via XTWIP  Start Time:  1:30 PM  End Time:  1:50 PM     Medication Therapy Recommendations  Atopic dermatitis, unspecified type    Current Medication: fexofenadine (ALLEGRA) 180 MG tablet   Rationale: Cannot afford medication product - Cost - Adherence   Recommendation: Change Medication - cetirizine 10 MG tablet   Status: Contact Provider - Awaiting Response         Class 3 severe obesity with serious comorbidity and body mass index (BMI) of 50.0 to 59.9 in adult (H)    Current Medication: naltrexone (DEPADE/REVIA) 50 MG tablet (Discontinued)   Rationale: No medical indication at this time - Unnecessary medication therapy - Indication   Recommendation: Discontinue Medication   Status: Accepted per CPA                Please do not hesitate to contact me if you have any questions/concerns.     Sincerely,     Lauren T. Bloch, [facility]

## 2024-04-02 NOTE — NURSING NOTE
Is the patient currently in the state of [place]? YES    Visit mode:VIDEO    If the visit is dropped, the patient can be reconnected by: VIDEO VISIT: Send to e-mail at: [email or web address]    Will anyone else be joining the visit? NO  (If patient encounters technical issues they should call [phone])    How would you like to obtain your AVS? MyChart    Are changes needed to the allergy or medication list? No, Pt stated no changes to allergies, and Pt stated no med changes      Reason for visit: Consult    Ligia Sparrow VVF    Pt stated weight last taken about 1.5 wks ago

## 2024-04-04 RX ORDER — CETIRIZINE HYDROCHLORIDE 10 MG/1
TABLET ORAL
Qty: 120 TABLET | Refills: 11 | Status: SHIPPED | OUTPATIENT
Start: 2024-04-04

## 2024-04-04 NOTE — PROGRESS NOTES
Update 4/4/2024:   Dr. Couch approved transition to cetirizine. RX sent into pharmacy. Patient informed via [employer].     Lauren Bloch, PharmD, BCACP   Medication Therapy Management Pharmacist   [facility]

## 2024-04-05 ENCOUNTER — OFFICE VISIT (OUTPATIENT)
Dept: FAMILY MEDICINE | Facility: CLINIC | Age: 19
End: 2024-04-05
Payer: COMMERCIAL

## 2024-04-05 VITALS
SYSTOLIC BLOOD PRESSURE: 128 MMHG | BODY MASS INDEX: 50.84 KG/M2 | RESPIRATION RATE: 14 BRPM | TEMPERATURE: 98.1 F | WEIGHT: 286.9 LBS | DIASTOLIC BLOOD PRESSURE: 80 MMHG | HEART RATE: 108 BPM | OXYGEN SATURATION: 96 % | HEIGHT: 63 IN

## 2024-04-05 DIAGNOSIS — Z23 ENCOUNTER FOR VACCINATION: ICD-10-CM

## 2024-04-05 DIAGNOSIS — R73.03 PRE-DIABETES: Primary | ICD-10-CM

## 2024-04-05 LAB — HBA1C MFR BLD: 5.2 % (ref 0–5.6)

## 2024-04-05 PROCEDURE — 83036 HEMOGLOBIN GLYCOSYLATED A1C: CPT | Performed by: FAMILY MEDICINE

## 2024-04-05 PROCEDURE — 99213 OFFICE O/P EST LOW 20 MIN: CPT | Mod: 25 | Performed by: FAMILY MEDICINE

## 2024-04-05 PROCEDURE — 36415 COLL VENOUS BLD VENIPUNCTURE: CPT | Performed by: FAMILY MEDICINE

## 2024-04-05 PROCEDURE — 90651 9VHPV VACCINE 2/3 DOSE IM: CPT | Performed by: FAMILY MEDICINE

## 2024-04-05 PROCEDURE — 90471 IMMUNIZATION ADMIN: CPT | Performed by: FAMILY MEDICINE

## 2024-04-05 PROCEDURE — 90472 IMMUNIZATION ADMIN EACH ADD: CPT | Performed by: FAMILY MEDICINE

## 2024-04-05 PROCEDURE — 90715 TDAP VACCINE 7 YRS/> IM: CPT | Performed by: FAMILY MEDICINE

## 2024-04-05 NOTE — RESULT ENCOUNTER NOTE
Results discussed directly with patient while patient was present. Any further details documented in the note.   NACHO AGUILAR, DO

## 2024-04-05 NOTE — PROGRESS NOTES
"  Assessment & Plan     Pre-diabetes  Chronic, resolved. Likely with patient's dietary changes (reduced carbohydrates, calories), forty pound weight loss. Also continue 1.7 subcutaneous of wegovy, however, strictly for weight management.   - Hemoglobin A1c  - Hemoglobin A1c    Encounter for vaccination  - TDAP 7+ (ADACEL,BOOSTRIX)  - HPV 9Y+ (Gardasil 9)      Ordering of each unique test  I spent a total of 20 minutes on the day of the visit.   Time spent by me doing chart review, history and exam, documentation and further activities per the note        See Patient Instructions    Alf Sky is a 18 year old, presenting for the following health issues:  Follow Up (Pt states about pre-diabetic and want to know if anything changes since he loss weight. )        4/5/2024    12:40 PM   Additional Questions   Roomed by Mark CALDERA   Accompanied by Self     History of Present Illness       Diabetes:   He presents for follow up of diabetes.    He is not checking blood glucose.        He is concerned about other.    He is not experiencing numbness or burning in feet, excessive thirst, blurry vision, weight changes or redness, sores or blisters on feet.           He eats 0-1 servings of fruits and vegetables daily.He consumes 0 sweetened beverage(s) daily.He exercises with enough effort to increase his heart rate 60 or more minutes per day.  He exercises with enough effort to increase his heart rate 5 days per week.   He is taking medications regularly.     Patient no longer using inhaler, and chest pain has not returned. He has not woken up at night with.     Pt states about pre-diabetic and see if there is any changes to it since he loss some weight.       Review of Systems  Constitutional, HEENT, cardiovascular, pulmonary, gi and gu systems are negative, except as otherwise noted.      Objective    /80   Pulse 108   Temp 98.1  F (36.7  C) (Oral)   Resp 14   Ht 1.6 m (5' 3\")   Wt 130.1 kg (286 lb 14.4 oz)   " SpO2 96%   BMI 50.82 kg/m    Body mass index is 50.82 kg/m .  Physical Exam   GENERAL: alert and no distress  EYES: Eyes grossly normal to inspection, PERRL and conjunctivae and sclerae normal  NECK: no adenopathy, no asymmetry, masses, or scars  MS: no gross musculoskeletal defects noted, no edema  SKIN: no suspicious lesions or rashes    Results for orders placed or performed in visit on 04/05/24 (from the past 24 hour(s))   Hemoglobin A1c   Result Value Ref Range    Hemoglobin A1C 5.2 0.0 - 5.6 %           Signed Electronically by: NACHO AGUILAR DO

## 2024-04-16 ENCOUNTER — PATIENT OUTREACH (OUTPATIENT)
Dept: CARE COORDINATION | Facility: CLINIC | Age: 19
End: 2024-04-16

## 2024-04-16 NOTE — PROGRESS NOTES
Clinic Care Coordination Contact  Community Health Worker Follow Up    Care Gaps:     Health Maintenance Due   Topic Date Due    ASTHMA ACTION PLAN  Never done    Pneumococcal Vaccine: Pediatrics (0 to 5 Years) and At-Risk Patients (6 to 64 Years) (1 of 2 - PCV) Never done    HEPATITIS B IMMUNIZATION (1 of 3 - 19+ 3-dose series) Never done    COVID-19 Vaccine (1 - 2023-24 season) Never done       Care Gaps Last addressed on 4/16/2024    Care Plan:   Care Plan: General       Problem: HP GENERAL PROBLEM       Long-Range Goal: I would like to work with the Bariatric Clinic to assist me with weight loss.       Start Date: 6/28/2023 Expected End Date: 2/28/2024    Recent Progress: 70%    Priority: High    Note:     Barriers: elevated BMI  Strengths: strong advocate for himself.  Patient expressed understanding of goal: yes  Action steps to achieve this goal:  1. I understand Dr. Louis has sent  referral to the Bariatric Clinic on my behalf.   2. I understand a representative from the Bariatric Clinic will contact me directly to scheduled an intake appointment.   3. I will report progress towards this goal at scheduled outreach telephone calls from my Care Coordination team.      Discussed, next appt is 5/7  Discussed 3/15- Next appt scheduled 4/1/2024  Discussed on 2/16/24  Discussed with sister 3/7 next appt  Discussed on 1/18/24 11/22 discussed, next PCP appt 11/28, next weight mgmt visit 1/12 and 1/16/24  Discussed - completed virtual visit  Discussed on 8/2/23                              Intervention and Education during outreach: Supportive Listening, CHW scheduled follow up CC RN call today 4/16 @ 2:00 pm    CHW spoke to [facility] today for monthly CC outreach call, patient is concerned about his breakout on his forearms, he states the heat/sun cause the outbreaks. He has tried several ointments nothing seems to work- he has PCP appointment scheduled 5/30- [facility] did see dermatologist, he states they were more concerned  about eczema on face. Next dermatology appointment is scheduled 6/14/2024.  CHW has scheduled follow up CC RN call for today at 2:00 pm to discuss patients concerns and to see if patient should be seen before 5/30 PCP appointment.    CHW and patient discussed and updated current CC goal progression regarding Bariatric Clinic    CHW Plan: Continue with monthly outreach call to discuss, support and update CC goal progression    Next CHW outreach call: 5/16/2024    Siri EVANS  Community Health Worker  [facility] Coord[facility]  [place] s[place] [email or web address]  Office: [phone]

## 2024-04-29 NOTE — PROGRESS NOTES
"Video-Visit Details    Type of service:  Video Visit    Video Start Time: 12:43 PM   Video End Time: 12:55 PM    Originating Location (pt. Location): Home    Distant Location (provider location): Offsite (providers home) [facility] [place]     Platform used for Video Visit: Workboard        Weight Management Nutrition Consultation    Quan \"Danie\" FLORA Davidson is a 18 year old male presents today for return weight management nutrition consultation.  Patient referred by Nirmala Torre PA-C on October 19, 2023.    Patient with Co-morbidities of obesity including:      10/12/2023     9:22 AM   --   I have the following health issues associated with obesity Pre-Diabetes    High Blood Pressure   I have the following symptoms associated with obesity Knee Pain    Back Pain    Fatigue    Hip Pain   Only has one kidney.     Anthropometrics:  Estimated body mass index is 54.92 kg/m  as calculated from the following:    Height as of 10/17/23: 1.613 m (5' 3.5\").    Weight as of 10/17/23: 142.9 kg (315 lb).    Pt reports the last weight check he had was end of March: 273 lbs (-42 lbs from initial)     Medications for Weight Loss:  Wegovy     NUTRITION HISTORY  Food allergies: None  Food intolerances: seafood  Vitamin/Mineral Supplements: None     Previous methods of diet modification for weight loss: Was 260lbs in high school. Was able to lose from 260lbs to 214lb wyatt year through going to the gym daily and low calorie diet. Was not sustainble through the pandemic, and regain weight. More recently had followed 36 hr fast followed by 12 h eating window, consistently for a month. Lost ~10-15 lbs while following. 2.5 years ago, stopped drinking all calorie-containing beverages. Also cut out eating chips and most desserts.   Tracked intake in guilherme called \"Nutricheck.\" Consuming 4707-8431 cole/day, protein:  gm, carb 192-220 gm.     1/12/24 - Injured shoulder which had significantly reduced " his physical activity, however is feeling better now. He reports eating 3 meals daily, much smaller portions. No longer following multiple day fasts.       2/5/24 - has tracked nutritional intake for past 25 days. He is not sure if home scale is accurate, but feels like he is losing weight.   Protein intake ranges from 50-70 gm protein daily, some days is only getting 15-20 gm protein. Has been skipping breakfast, otherwise reports he would be over calorie goal.     3/7/24- Switched to Wegovy. Endorses many benefits from starting this med - less appetite, smaller portions, less reflux and mucous.  Increased rate of weight loss this month, losing avg 2-3 lbs per week (~1%).  Calories 2695-9258 cole/day. Feels great at this level. Would have to force intake for greater calorie intake. Consuming  gm protein daily. Going to the gym with brother now as well. Helping to motivate family to make healthier diet change as they are seeing his results.     Today - Continues Wegovy, on 1.7 mg dose currently. Reports diet/lifestyle changes has been going very well overall. He does notice he has not been watching protein intake as closely lately. Also lost [employer] membership, so has not been to the gym, however has increased walking to 7000+ steps daily.   On streak of tracking for 115 days consecutively. Limiting calories to 1500 cole/day, typically consuming 1300 cole/day. Typically doing two meals and two snacks. Limits snacks to 300 cole/day.   Reports consuming more fruit/veggies as family is making changes too.  Significantly cut down on bread, rice, fast-food   Identifies needing to work on increasing water intake.  HgbA1c 5.2 on 4/5/24.    Diet Recall:  Breakfast: banana, doughnut or skip  Lunch: lean ground beef/turkey/eggs   Dinner: sister's making - Chicken tinga; steak and potatoes        Progress Towards Previous Goals:  1) Continue tracking nutrition in guilherme. Aim for 1876-6020 cole/day. - Met, continues   2) Consume 90+ gm  "protein daily. - Met at times  3) Add in a serving of fruit or vegetable with at least one meal daily - Met, continues to ensure each meal has protein and fruit/veggie.   4) Consider starting a daily multivitamin - Not met    Physical Activity:  3500 steps/day. Doing 100 push-up, sit-ups and curls.   Started going to the gym with his brother.     Nutrition Prescription  Recommended energy/nutrient modification.  0642-3386 calories/day    Nutrition Diagnosis  Obesity r/t long history of positive energy balance aeb BMI >30. - Ongoing    Nutrition Intervention  Materials/education provided on hypocaloric diet for weight loss.   Reviewed progress towards previous goals. Praised pt on the progress he has made with diet/lifestyle and reducing HgbA1c.   Encouraged continuing to focus on high-nutrient value foods within calorie deficit. Encouraged daily MVI to meet micronutrient needs.   Encouraged increased fluid intake, he plans to down load and guilherme to help with this.   Encouraged increased physical activity.   Patient demonstrates understanding.  Co-developed goals to work towards.   Provided pt with list of goals and resources below via [email or web address].     Expected Engagement: good  Follow-Up Plans:      Nutrition Goals  1) Continue tracking nutrition in guilherme. Aim for <1500 cole/day.   2) Consume 90+ gm protein daily.   3) Add in a serving of fruit or vegetable with at least one meal daily   4) Consider starting a daily multivitamin   5) Increase fluid intake, aim for 96 oz/day.  6) Increase physical activity outside of the gym        Quick/Easy Protein Sources:  Hard boiled eggs  Part-skim cheese sticks  Baby Bell cheese rounds  Low-fat/low-sugar Greek yogurt  Low-fat cottage cheese  Lean deli meat (chicken/turkey/ham)  Roasted chickpeas or lentils  Nuts   Turkey meat stick  Protein shake/bar  \"P3\" snack (cheese, nuts, deli meat)  Aldi's \"Protein Bread\"   \"Egglife\" egg white wrap    Tuna/salmon can/packet     Protein Sources "   [email or web address]       Meal Replacement Shake Options:   *Protein Shake Criteria: no more than 210 Calories, at least 20 grams of protein, and less than 10 grams of sugar   [facility] smoothie (160 Calories, 20 g protein)   Premier Protein (160 Calories, 30 g protein)  Slim Fast Advanced Nutrition (180 Calories, 20 g protein)  Muscle Milk, lactose-free, 17 oz bottle (210 Calories, 30 g protein)  Integrated Supplements, no artificial sugars (110 Calories, 20 g protein)  Boost/Ensure Max (160 calories, 30 gm protein)   Fairlife Protein Shakes (160-230 calories, 26-42 gm protein)  Aldi's Elevation Protein Powder (180 calories, 30 gm protein)   Orgain Protein Shakes (130-160 calories, 20-26 gm protein)     Meal Replacement Bar Options:  [facility] Protein Shake (160 Calories, 15 g protein)  Quest Protein Bars (190 Calories, 20 g protein)  Built Bar (170 Calories, 15-20 g protein)  One Protein Bar (210 calories, 20 g protein)  [place] Signature Protein Bar (Costco) (190 Calories, 21 g protein)  Pure Protein Bars (180 Calories, 21 g protein)      At Home Exercise Resources  ACE Fitness Library - Exercises by Muscle Group  https://www.SuccessTSM.org/resources/everyone/exercise-library/    Channels with Exercise Modifications:  Coach Geller (strengthening) [email or web address]  HASfit (strengthening): https://www.MyCareube.com/channel/QQZRT7-ZEHQv80BC-z4KZmqO  Yoga with Zelinda: [email or web address]      Handouts Relating to Exercise :    1.     Learning About Being Physically Active     2.      Muscle Conditionin Exercises    3.     Resistance Training with Free Weights: 3 Exercises    4.      Resistance Training with Surgical Tubin Exercises    5.     Stretchin Exercises    6.     Seated Exercises for Arms and Legs: 11 Exercises      Follow-Up:  3 months, PRN    Time spent with patient: 12 minutes.  Montserrat Nazario, RD, LD

## 2024-04-30 ENCOUNTER — VIRTUAL VISIT (OUTPATIENT)
Dept: ENDOCRINOLOGY | Facility: CLINIC | Age: 19
End: 2024-04-30
Payer: COMMERCIAL

## 2024-04-30 VITALS — BODY MASS INDEX: 50.83 KG/M2 | HEIGHT: 63 IN

## 2024-04-30 DIAGNOSIS — E66.813 CLASS 3 SEVERE OBESITY WITH SERIOUS COMORBIDITY AND BODY MASS INDEX (BMI) OF 50.0 TO 59.9 IN ADULT, UNSPECIFIED OBESITY TYPE (H): Primary | ICD-10-CM

## 2024-04-30 DIAGNOSIS — E66.01 CLASS 3 SEVERE OBESITY WITH SERIOUS COMORBIDITY AND BODY MASS INDEX (BMI) OF 50.0 TO 59.9 IN ADULT, UNSPECIFIED OBESITY TYPE (H): Primary | ICD-10-CM

## 2024-04-30 DIAGNOSIS — Z71.3 NUTRITIONAL COUNSELING: ICD-10-CM

## 2024-04-30 PROCEDURE — 99207 PR NO CHARGE LOS: CPT | Mod: 95 | Performed by: DIETITIAN, REGISTERED

## 2024-04-30 PROCEDURE — 97803 MED NUTRITION INDIV SUBSEQ: CPT | Mod: 95 | Performed by: DIETITIAN, REGISTERED

## 2024-04-30 ASSESSMENT — PAIN SCALES - GENERAL: PAINLEVEL: NO PAIN (0)

## 2024-04-30 NOTE — NURSING NOTE
Is the patient currently in the state of [place]? YES    Visit mode:VIDEO    If the visit is dropped, the patient can be reconnected by: VIDEO VISIT: Text to cell phone:   Telephone Information:   Mobile [phone]       Will anyone else be joining the visit? NO  (If patient encounters technical issues they should call [phone]    How would you like to obtain your AVS? MyChart    Are changes needed to the allergy or medication list? N/A    Are refills needed on medications prescribed by this physician? NO    Reason for visit: RECHECK (ELISABETH Nutrition)    Ligia VEGA

## 2024-04-30 NOTE — PATIENT INSTRUCTIONS
"Hi Jose Daniel,    Follow-up with RD in 3 months.     Thank you,    Montserrat Nazario, RD, LD  If you would like to schedule or reschedule an appointment with the RD, please call [phone]    Nutrition Goals  1) Continue tracking nutrition in guilherme. Aim for <1500 cole/day.   2) Consume 90+ gm protein daily.   3) Add in a serving of fruit or vegetable with at least one meal daily   4) Consider starting a daily multivitamin   5) Increase fluid intake, aim for 96 oz/day.  6) Increase physical activity outside of the gym        Quick/Easy Protein Sources:  Hard boiled eggs  Part-skim cheese sticks  Baby Bell cheese rounds  Low-fat/low-sugar Greek yogurt  Low-fat cottage cheese  Lean deli meat (chicken/turkey/ham)  Roasted chickpeas or lentils  Nuts   Turkey meat stick  Protein shake/bar  \"P3\" snack (cheese, nuts, deli meat)  Aldi's \"Protein Bread\"   \"Egglife\" egg white wrap    Tuna/salmon can/packet     Protein Sources   [email or web address]       Meal Replacement Shake Options:   *Protein Shake Criteria: no more than 210 Calories, at least 20 grams of protein, and less than 10 grams of sugar   [facility] smoothie (160 Calories, 20 g protein)   Premier Protein (160 Calories, 30 g protein)  Slim Fast Advanced Nutrition (180 Calories, 20 g protein)  Muscle Milk, lactose-free, 17 oz bottle (210 Calories, 30 g protein)  Integrated Supplements, no artificial sugars (110 Calories, 20 g protein)  Boost/Ensure Max (160 calories, 30 gm protein)   Fairlife Protein Shakes (160-230 calories, 26-42 gm protein)  Aldi's Elevation Protein Powder (180 calories, 30 gm protein)   Orgain Protein Shakes (130-160 calories, 20-26 gm protein)     Meal Replacement Bar Options:  [facility] Protein Shake (160 Calories, 15 g protein)  Quest Protein Bars (190 Calories, 20 g protein)  Built Bar (170 Calories, 15-20 g protein)  One Protein Bar (210 calories, 20 g protein)  [place] Signature Protein Bar (Costco) (190 Calories, 21 g protein)  Pure " Protein Bars (180 Calories, 21 g protein)      At Home Exercise Resources  ACE Fitness Library - Exercises by Muscle Group  https://www.[employer].org/resources/everyone/exercise-library/    Channels with Exercise Modifications:  Coach Geller (strengthening) [email or web address] (strengthening): https://www.ZenRoboticsube.com/channel/MXYTB5-VVKBg03TC-[email or web address]  Yoga with Zelinda: [email or web address]      Handouts Relating to Exercise :    1.     Learning About Being Physically Active     2.      Muscle Conditionin Exercises    3.     Resistance Training with Free Weights: 3 Exercises    4.      Resistance Training with Surgical Tubin Exercises    5.     Stretchin Exercises    6.     Seated Exercises for Arms and Legs: 11 Exercises          COMPREHENSIVE WEIGHT MANAGEMENT PROGRAM  VIRTUAL SUPPORT GROUPS    At [facility], our Comprehensive Weight Management program offers on-line support groups for patients who are working on weight loss and considering, preparing for, or have had weight loss surgery.     There is no cost for this opportunity.  You are invited to attend the?Virtual Support Groups?provided by any of the following locations:    [facility] via [employer] Teams with Karen Hendrix RN  2.   [place] via [employer] with Bebeto Liu, PhD, LP  3.   [place] via [employer] with Brianda Short RN  4.   [facility] via a Zoom Meeting with JESÚS Rose    The following Support Group information can also be found on our website:  [email or web address]      [facility] Weight Loss Surgery Support Group  The support group is a patient-lead forum that meets monthly to share experiences, encouragement and education. It is open to those who have had weight loss surgery, are scheduled for surgery, or are considering surgery.   WHEN: This group meets  "on the 3rd Wednesday of each month from 5:00PM - 6:00PM virtually using Microsoft Teams.   FACILITATOR: Led by Karen Abdullahi RD, LD, RN, the program's Clinical Coordinator.   TO REGISTER: Please contact the clinic via [employer] or call the nurse line directly at [phone] to inform our staff that you would like an invite sent to you and to let us know the email you would like the invite sent to. Prior to the meeting, a link with directions on how to join the meeting will be sent to you.    2023 and 2024 Meetings   December 20  January 17  February 21  March 20  April 17  May 15  Nayely 19      [facility] Bariatric Care Support Group?  This is open to all pre- and post- operative bariatric surgery patients as well as their support system.   WHEN: This group meets the 3rd Tuesday of each month from 6:30 PM - 8:00 PM virtually using Microsoft Teams.   FACILITATOR: Led by Bebeto Liu, Ph.D who is a Licensed Psychologist with the [facility] Comprehensive Weight Management Program.   TO REGISTER: Please send an email to Bebeto Liu, Ph.D., LP at?[email or web address]?if you would like an invitation to the group. Prior to the meeting, a link with directions on how to join the meeting will be sent to you.    2023 and 2024 Meetings  December 19 January 16: \"Medication Management and Bariatric Surgery\", Ramya Chaparro, PharmD, Pharmacy Resident at [facility]  February 20: \"A Bariatric Surgery Patient's Perspective\", WILI Farrar, [facility], Behavioral Health Clinician at [facility]  March 19  April 16  May 21  Nayely 18: \"Nutritional Labeling\", Dietitian speaker to be announced.  November 19: \"Holiday Eating\", Dietitian speaker to be announced.    [facility] Post-Operative Bariatric Surgery Support Group  This is a support group for [facility] " "bariatric patients (and those external to [facility]) who have had bariatric surgery and are at least 3 months post-surgery.  WHEN: This support group meets the 4th Wednesday of the month from 11:00 AM - 12:00 PM virtually using Microsoft Teams.   FACILITATOR: Led by Certified Bariatric Nurse, Brianda Short RN.   TO REGISTER: Please send an email to Brianda at [email or web address] if you would like an invitation to the group.  Prior to the meeting, a link with directions on how to join the meeting will be sent to you.    2023 and 2024 Meetings  December 27  January 24  February 28  March 27  April 24  May 22  Nayely 26    [facility] Healthy Lifestyle Group?  This is a 60 minute virtual coaching group for those who want to lead a healthier lifestyle. Come together to set goals and overcome barriers in a supportive group environment. We will address the four pillars of health: nutrition, exercise, sleep and emotional well-being.  This group is highly recommended for those who are participating in the 24 week Healthy Lifestyle Plan and our Health Coaching sessions.  WHEN: This group meets the 1st Friday of the month, 12:30 PM - 1:30 PM online, via a zoom meeting.    FACILITATOR: Led by National Board Certified Health and , Brianda Miller [facility].   TO REGISTER: Please call the Call Center at [phone] to register.  You will get an appointment to attend in [place]. Fifteen minutes prior to the meeting, complete the e-check in and you will get the link to join the meeting.    There is no charge to attend this group and space is limited.     2023 and 2024 Meetings  December 1: \"Let's Talk\" (guided discussion on our wins and challenges)  January 5: \"New Years Vision: Manifest your Best 2024!\" (guided imagery,  journaling and discussion)  February 2: \"Let's Talk\"  March 1: \"10 Percent Happier\" by Juan Jose Munoz (Book Bites - a guided discussion on the nuggets of " "wisdom from favorite wellness books, no need to read the book but highly encouraged)  April 5: \"Let's Talk\"  May 3: \"Essentialism: The Disciplined Pursuit of Less\" by Glen Alex (Book Bites discussion)  June 7: \"Let's Talk\"  July 5: NO MEETING, off for the 4th of July Holiday  August 2: \"The Blue Zones, Secrets for Living a Longer Life\" by Juan Jose Hopkins (Book Bites discussion)                    "

## 2024-04-30 NOTE — LETTER
"4/30/2024       RE: Quan Davidson  [address]  [place] [address]     Dear Colleague,    Thank you for referring your patient, Quan Davidson, to the [facility] [place] at [facility]. Please see a copy of my visit note below.    Video-Visit Details    Type of service:  Video Visit    Video Start Time: 12:43 PM   Video End Time: 12:55 PM    Originating Location (pt. Location): Home    Distant Location (provider location): Offsite (providers home) [facility] [place]     Platform used for Video Visit: Geosophic        Weight Management Nutrition Consultation    Quan \"Danie\" FLORA Davidson is a 18 year old male presents today for return weight management nutrition consultation.  Patient referred by Nirmala Torre PA-C on October 19, 2023.    Patient with Co-morbidities of obesity including:      10/12/2023     9:22 AM   --   I have the following health issues associated with obesity Pre-Diabetes    High Blood Pressure   I have the following symptoms associated with obesity Knee Pain    Back Pain    Fatigue    Hip Pain   Only has one kidney.     Anthropometrics:  Estimated body mass index is 54.92 kg/m  as calculated from the following:    Height as of 10/17/23: 1.613 m (5' 3.5\").    Weight as of 10/17/23: 142.9 kg (315 lb).    Pt reports the last weight check he had was end of March: 273 lbs (-42 lbs from initial)     Medications for Weight Loss:  Wegovy     NUTRITION HISTORY  Food allergies: None  Food intolerances: seafood  Vitamin/Mineral Supplements: None     Previous methods of diet modification for weight loss: Was 260lbs in high school. Was able to lose from 260lbs to 214lb wyatt year through going to the gym daily and low calorie diet. Was not sustainble through the pandemic, and regain weight. More recently had followed 36 hr fast followed by 12 h eating window, " "consistently for a month. Lost ~10-15 lbs while following. 2.5 years ago, stopped drinking all calorie-containing beverages. Also cut out eating chips and most desserts.   Tracked intake in guilherme called \"Nutricheck.\" Consuming 4548-5678 cole/day, protein:  gm, carb 192-220 gm.     1/12/24 - Injured shoulder which had significantly reduced his physical activity, however is feeling better now. He reports eating 3 meals daily, much smaller portions. No longer following multiple day fasts.       2/5/24 - has tracked nutritional intake for past 25 days. He is not sure if home scale is accurate, but feels like he is losing weight.   Protein intake ranges from 50-70 gm protein daily, some days is only getting 15-20 gm protein. Has been skipping breakfast, otherwise reports he would be over calorie goal.     3/7/24- Switched to Wegovy. Endorses many benefits from starting this med - less appetite, smaller portions, less reflux and mucous.  Increased rate of weight loss this month, losing avg 2-3 lbs per week (~1%).  Calories 0038-6304 cole/day. Feels great at this level. Would have to force intake for greater calorie intake. Consuming  gm protein daily. Going to the gym with brother now as well. Helping to motivate family to make healthier diet change as they are seeing his results.     Today - Continues Wegovy, on 1.7 mg dose currently. Reports diet/lifestyle changes has been going very well overall. He does notice he has not been watching protein intake as closely lately. Also lost [employer] membership, so has not been to the gym, however has increased walking to 7000+ steps daily.   On streak of tracking for 115 days consecutively. Limiting calories to 1500 cole/day, typically consuming 1300 cole/day. Typically doing two meals and two snacks. Limits snacks to 300 cole/day.   Reports consuming more fruit/veggies as family is making changes too.  Significantly cut down on bread, rice, fast-food   Identifies needing to work on " increasing water intake.  HgbA1c 5.2 on 4/5/24.    Diet Recall:  Breakfast: banana, doughnut or skip  Lunch: lean ground beef/turkey/eggs   Dinner: sister's making - Chicken tinga; steak and potatoes        Progress Towards Previous Goals:  1) Continue tracking nutrition in guilherme. Aim for 1717-7845 cole/day. - Met, continues   2) Consume 90+ gm protein daily. - Met at times  3) Add in a serving of fruit or vegetable with at least one meal daily - Met, continues to ensure each meal has protein and fruit/veggie.   4) Consider starting a daily multivitamin - Not met    Physical Activity:  3500 steps/day. Doing 100 push-up, sit-ups and curls.   Started going to the gym with his brother.     Nutrition Prescription  Recommended energy/nutrient modification.  8364-5654 calories/day    Nutrition Diagnosis  Obesity r/t long history of positive energy balance aeb BMI >30. - Ongoing    Nutrition Intervention  Materials/education provided on hypocaloric diet for weight loss.   Reviewed progress towards previous goals. Praised pt on the progress he has made with diet/lifestyle and reducing HgbA1c.   Encouraged continuing to focus on high-nutrient value foods within calorie deficit. Encouraged daily MVI to meet micronutrient needs.   Encouraged increased fluid intake, he plans to down load and guilherme to help with this.   Encouraged increased physical activity.   Patient demonstrates understanding.  Co-developed goals to work towards.   Provided pt with list of goals and resources below via Innozt.     Expected Engagement: good  Follow-Up Plans:      Nutrition Goals  1) Continue tracking nutrition in guilherme. Aim for <1500 cole/day.   2) Consume 90+ gm protein daily.   3) Add in a serving of fruit or vegetable with at least one meal daily   4) Consider starting a daily multivitamin   5) Increase fluid intake, aim for 96 oz/day.  6) Increase physical activity outside of the gym        Quick/Easy Protein Sources:  Hard boiled eggs  Part-skim  "cheese sticks  Baby Bell cheese rounds  Low-fat/low-sugar Greek yogurt  Low-fat cottage cheese  Lean deli meat (chicken/turkey/ham)  Roasted chickpeas or lentils  Nuts   Turkey meat stick  Protein shake/bar  \"P3\" snack (cheese, nuts, deli meat)  Aldi's \"Protein Bread\"   \"Egglife\" egg white wrap    Tuna/salmon can/packet     Protein Sources   [email or web address]       Meal Replacement Shake Options:   *Protein Shake Criteria: no more than 210 Calories, at least 20 grams of protein, and less than 10 grams of sugar   [facility] smoothie (160 Calories, 20 g protein)   Premier Protein (160 Calories, 30 g protein)  Slim Fast Advanced Nutrition (180 Calories, 20 g protein)  Muscle Milk, lactose-free, 17 oz bottle (210 Calories, 30 g protein)  Integrated Supplements, no artificial sugars (110 Calories, 20 g protein)  Boost/Ensure Max (160 calories, 30 gm protein)   Fairlife Protein Shakes (160-230 calories, 26-42 gm protein)  Aldi's Elevation Protein Powder (180 calories, 30 gm protein)   Orgain Protein Shakes (130-160 calories, 20-26 gm protein)     Meal Replacement Bar Options:  [facility] Protein Shake (160 Calories, 15 g protein)  Quest Protein Bars (190 Calories, 20 g protein)  Built Bar (170 Calories, 15-20 g protein)  One Protein Bar (210 calories, 20 g protein)  [place] Signature Protein Bar (Costco) (190 Calories, 21 g protein)  Pure Protein Bars (180 Calories, 21 g protein)      At Home Exercise Resources  ACE Fitness Library - Exercises by Muscle Group  https://www.[employer].org/resources/everyone/exercise-library/    Channels with Exercise Modifications:  Coach Geller (strengthening) https://www.[email or web address]/@Howard [email or web address] (strengthening): https://www.[employer]WOT [employer] Ltd.ube.com/channel/SBIGD9-KQXKk06RQ-w1TVajN  Yoga with Zelinda: https://www.youtube.com/@yogawithzelinda      Handouts Relating to Exercise :    1.     Learning About Being Physically Active     2.      Muscle Conditionin Exercises    3.     " Resistance Training with Free Weights: 3 Exercises    4.      Resistance Training with Surgical Tubin Exercises    5.     Stretchin Exercises    6.     Seated Exercises for Arms and Legs: 11 Exercises      Follow-Up:  3 months, PRN    Time spent with patient: 12 minutes.  Montserrat Nazario RD, LD

## 2024-05-03 ENCOUNTER — TELEPHONE (OUTPATIENT)
Dept: ENDOCRINOLOGY | Facility: CLINIC | Age: 19
End: 2024-05-03
Payer: COMMERCIAL

## 2024-05-03 NOTE — TELEPHONE ENCOUNTER
Left Voicemail (1st Attempt) and Sent Mychart (1st Attempt) for the patient to call back and schedule the following:    Appointment type: RET [facility] Nutrition  Provider: Montserrat Nazario  Return date: around 7/30/24  Specialty phone number: [phone]  Additional appointment(s) needed: n/a  Additonal Notes: n/a

## 2024-05-07 ENCOUNTER — OFFICE VISIT (OUTPATIENT)
Dept: FAMILY MEDICINE | Facility: CLINIC | Age: 19
End: 2024-05-07
Payer: COMMERCIAL

## 2024-05-07 VITALS
HEIGHT: 63 IN | RESPIRATION RATE: 14 BRPM | OXYGEN SATURATION: 98 % | HEART RATE: 92 BPM | DIASTOLIC BLOOD PRESSURE: 72 MMHG | SYSTOLIC BLOOD PRESSURE: 108 MMHG | WEIGHT: 273.6 LBS | TEMPERATURE: 98.5 F | BODY MASS INDEX: 48.48 KG/M2

## 2024-05-07 DIAGNOSIS — R09.81 NASAL CONGESTION: ICD-10-CM

## 2024-05-07 DIAGNOSIS — J02.9 SORE THROAT: ICD-10-CM

## 2024-05-07 DIAGNOSIS — L50.5 CHOLINERGIC URTICARIA: Primary | ICD-10-CM

## 2024-05-07 DIAGNOSIS — R06.83 SNORING: ICD-10-CM

## 2024-05-07 PROBLEM — E66.01 CLASS 3 SEVERE OBESITY WITH SERIOUS COMORBIDITY AND BODY MASS INDEX (BMI) OF 50.0 TO 59.9 IN ADULT (H): Status: RESOLVED | Noted: 2023-10-18 | Resolved: 2024-05-07

## 2024-05-07 PROBLEM — E66.813 CLASS 3 SEVERE OBESITY WITH SERIOUS COMORBIDITY AND BODY MASS INDEX (BMI) OF 50.0 TO 59.9 IN ADULT (H): Status: RESOLVED | Noted: 2023-10-18 | Resolved: 2024-05-07

## 2024-05-07 PROCEDURE — 99214 OFFICE O/P EST MOD 30 MIN: CPT | Performed by: FAMILY MEDICINE

## 2024-05-07 RX ORDER — HYDROXYZINE PAMOATE 50 MG/1
50 CAPSULE ORAL 3 TIMES DAILY PRN
Qty: 90 CAPSULE | Refills: 0 | Status: SHIPPED | OUTPATIENT
Start: 2024-05-07 | End: 2024-07-05

## 2024-05-07 RX ORDER — CROMOLYN SODIUM 5.2 MG
1 AEROSOL, SPRAY WITH PUMP (ML) NASAL 2 TIMES DAILY
Qty: 13 ML | Refills: 1 | Status: SHIPPED | OUTPATIENT
Start: 2024-05-07

## 2024-05-07 RX ORDER — HYDROCORTISONE 25 MG/G
OINTMENT TOPICAL
COMMUNITY
Start: 2024-04-22

## 2024-05-07 NOTE — PROGRESS NOTES
Assessment & Plan     Cholinergic urticaria  Chronic. Unclear if symptoms of flexural surface rash from atopic dermatitis versus cholinergic urticaria. Given patient symptoms, consider cholinergic urticaria. Since patient not seeing improvement with zyrtec, recommend trial of first generation anti histamine. Discussed concerns regarding long term use, however, therapy for this condition is limited. Additionally, recommend dermatology and allergy weigh in, messaged specialists.   - hydrOXYzine (VISTARIL) 50 MG capsule  Dispense: 90 capsule; Refill: 0    Sore throat  Chronic, likely multifactorial from seasonal allergies, environmental irritants, snoring. Recommend improving all three.     Snoring  Chronic, without low energy as it has improved since losing weight. However, given other symptoms and patient still at risk, he would like a sleep study.   - Adult Sleep Eval & Management  Referral    Nasal congestion  Chronic, not well controlled. Given chronic otalgia, recommend additional nasal spray to improve allergic symptoms.   - cromolyn sodium (NASALCROM) 5.2 MG/ACT nasal aerosol  Dispense: 13 mL; Refill: 1      Ordering of each unique test  Prescription drug management  I spent a total of 31 minutes on the day of the visit.   Time spent by me doing chart review, history and exam, documentation and further activities per the note        See Patient Instructions    Subjective   Jose Daniel is a 18 year old, presenting for the following health issues:  Arm sensitive  (Both forearm is breaking out eczema when it's in direct sunlight and heat. It is also itchy. ) and Ear Problem (Left ear hurts when blowing nose, also having a popping nose and having a lot of pain and started yesterday. Pt also stated that last week and half his throat was dry and had a scratchy feeling and not sure if it is tie to the ear pain. )        5/7/2024     9:52 AM   Additional Questions   Roomed by Mark CALDERA MA   Accompanied by Self  "    History of Present Illness       Reason for visit:  Eczema flare ups due to sensitivity to sunlight and heat  Symptom onset:  More than a month  Symptoms include:  Extreme itching  Symptom intensity:  Moderate  Symptom progression:  Worsening  Had these symptoms before:  Yes  Has tried/received treatment for these symptoms:  Yes  Previous treatment was successful:  No  What makes it worse:  Scratching it  What makes it better:  Cooling it down with cold air or ice pack    He eats 2-3 servings of fruits and vegetables daily.He consumes 0 sweetened beverage(s) daily.He exercises with enough effort to increase his heart rate 9 or less minutes per day.  He exercises with enough effort to increase his heart rate 7 days per week.   He is taking medications regularly.       Pt mentioned that he tried using hydrocortisone on the arms but it didn't help at all. The only thing that help was using a ice pack. Even with wearing long sleeve to protect it from direct sunlight, pt states that the heat is still making it itchy and irritated.       Heat urticaria  -Patient has been to [facility] and [facility], but addressed only the facial dermatitis.   -Patient's arm rash is more severe, and gets only worse with direct heat or sunlight. Sunscreen does not help, neither does using the steroid cream as a preventative.   -The pimecrolimus also makes it worse   -He has been using the zyrtec three times a day with no help  -Does not appear in other parts of the body.       Review of Systems  Constitutional, HEENT, cardiovascular, pulmonary, gi and gu systems are negative, except as otherwise noted.      Objective    /72   Pulse 92   Temp 98.5  F (36.9  C) (Oral)   Resp 14   Ht 1.6 m (5' 2.99\")   Wt 124.1 kg (273 lb 9.6 oz)   SpO2 98%   BMI 48.48 kg/m    Body mass index is 48.48 kg/m .  Physical Exam   GENERAL: alert and no distress  EYES: Eyes grossly normal to inspection, PERRL and conjunctivae and sclerae " normal  HENT: normal cephalic/atraumatic, both ears: clear effusion, nose and mouth without ulcers or lesions, oropharynx clear, oral mucous membranes moist, tonsillar hypertrophy, and tonsillar erythema  NECK: no adenopathy, no asymmetry, masses, or scars  MS: no gross musculoskeletal defects noted, no edema  SKIN: no suspicious lesions or rashes          Signed Electronically by: NACHO AGUILAR,

## 2024-05-07 NOTE — PATIENT INSTRUCTIONS
I will message back when I hear from Dr. Couch or Karel regarding your rash    I think your ear tube pain is from congestion. We will try an additional nasal spray. It can cause dryness and possible nose bleeds.     Let me know if you have concerns about anything else!

## 2024-05-17 ENCOUNTER — TELEPHONE (OUTPATIENT)
Dept: NEPHROLOGY | Facility: CLINIC | Age: 19
End: 2024-05-17
Payer: COMMERCIAL

## 2024-05-17 DIAGNOSIS — E66.813 CLASS 3 SEVERE OBESITY WITH SERIOUS COMORBIDITY AND BODY MASS INDEX (BMI) OF 50.0 TO 59.9 IN ADULT, UNSPECIFIED OBESITY TYPE (H): Primary | ICD-10-CM

## 2024-05-17 DIAGNOSIS — E66.01 CLASS 3 SEVERE OBESITY WITH SERIOUS COMORBIDITY AND BODY MASS INDEX (BMI) OF 50.0 TO 59.9 IN ADULT, UNSPECIFIED OBESITY TYPE (H): Primary | ICD-10-CM

## 2024-05-17 RX ORDER — MULTIVITAMIN
1 TABLET ORAL DAILY
Qty: 90 TABLET | Refills: 3 | Status: SHIPPED | OUTPATIENT
Start: 2024-05-17

## 2024-05-22 ENCOUNTER — PATIENT OUTREACH (OUTPATIENT)
Dept: CARE COORDINATION | Facility: CLINIC | Age: 19
End: 2024-05-22
Payer: COMMERCIAL

## 2024-05-22 NOTE — PROGRESS NOTES
Clinic Care Coordination Contact  Community Health Worker Follow Up    Care Gaps:     Health Maintenance Due   Topic Date Due    ASTHMA ACTION PLAN  Never done    Pneumococcal Vaccine: Pediatrics (0 to 5 Years) and At-Risk Patients (6 to 64 Years) (1 of 2 - PCV) Never done    HEPATITIS B IMMUNIZATION (1 of 3 - 19+ 3-dose series) Never done    COVID-19 Vaccine (1 - 2023-24 season) Never done       Care Gaps Last addressed on 5/22/2024    Care Plan:   Care Plan: General       Problem: HP GENERAL PROBLEM       Long-Range Goal: I would like to work with the Bariatric Clinic to assist me with weight loss.       Start Date: 6/28/2023 Expected End Date: 2/28/2024    This Visit's Progress: 80% Recent Progress: 70%    Priority: High    Note:     Barriers: elevated BMI  Strengths: strong advocate for himself.  Patient expressed understanding of goal: yes  Action steps to achieve this goal:  1. I understand Dr. Louis has sent  referral to the Bariatric Clinic on my behalf.   2. I understand a representative from the Bariatric Clinic will contact me directly to scheduled an intake appointment.   3. I will report progress towards this goal at scheduled outreach telephone calls from my Care Coordination team.      Discussed- doing well, looking for prescribed scale  Discussed, next appt is 5/7  Discussed 3/15- Next appt scheduled 4/1/2024  Discussed on 2/16/24  Discussed with sister 3/7 next appt  Discussed on 1/18/24 11/22 discussed, next PCP appt 11/28, next weight mgmt visit 1/12 and 1/16/24  Discussed - completed virtual visit  Discussed on 8/2/23                              Intervention and Education during outreach: Supportive listening, CHW sent message to PCP per patient's request.    CHW spoke to Jose Daniel today for monthly CC outreach call, patient states he is doing well.  Jose Daniel continues to attend all scheduled appointments.  Patient is wondering how he can go about getting a prescribed scale for him as he has not been  able to gain himself in over a month.  Jose Daniel was also interested in scheduling a lab appointment as patient would like to see what his blood work is like now.  CHW will send message to PCP and CC RN.    CHW Plan: Continue with monthly outreach call to discuss, support and update CC goal progression    Next CHW outreach call: 6/21/2024    Siri EVANS  Community Health Worker  [facility] Care Coordination  [facility] [email or web address]  Office: [phone]

## 2024-05-23 ENCOUNTER — VIRTUAL VISIT (OUTPATIENT)
Dept: ENDOCRINOLOGY | Facility: CLINIC | Age: 19
End: 2024-05-23
Payer: COMMERCIAL

## 2024-05-23 ENCOUNTER — ALLIED HEALTH/NURSE VISIT (OUTPATIENT)
Dept: FAMILY MEDICINE | Facility: CLINIC | Age: 19
End: 2024-05-23
Payer: COMMERCIAL

## 2024-05-23 VITALS — BODY MASS INDEX: 48.47 KG/M2 | HEIGHT: 63 IN

## 2024-05-23 VITALS — WEIGHT: 266.3 LBS | BODY MASS INDEX: 47.17 KG/M2

## 2024-05-23 DIAGNOSIS — E66.01 CLASS 3 SEVERE OBESITY WITH SERIOUS COMORBIDITY AND BODY MASS INDEX (BMI) OF 50.0 TO 59.9 IN ADULT, UNSPECIFIED OBESITY TYPE (H): Primary | ICD-10-CM

## 2024-05-23 DIAGNOSIS — Z71.3 NUTRITIONAL COUNSELING: ICD-10-CM

## 2024-05-23 DIAGNOSIS — Z76.89 ENCOUNTER FOR WEIGHT MANAGEMENT: Primary | ICD-10-CM

## 2024-05-23 DIAGNOSIS — E66.813 CLASS 3 SEVERE OBESITY WITH SERIOUS COMORBIDITY AND BODY MASS INDEX (BMI) OF 50.0 TO 59.9 IN ADULT, UNSPECIFIED OBESITY TYPE (H): Primary | ICD-10-CM

## 2024-05-23 PROCEDURE — 99207 PR NO CHARGE LOS: CPT | Mod: 95 | Performed by: DIETITIAN, REGISTERED

## 2024-05-23 PROCEDURE — 99207 PR NO CHARGE NURSE ONLY: CPT

## 2024-05-23 PROCEDURE — 97803 MED NUTRITION INDIV SUBSEQ: CPT | Mod: 95 | Performed by: DIETITIAN, REGISTERED

## 2024-05-23 NOTE — PATIENT INSTRUCTIONS
Hi Jose Daniel,    Follow-up with RD in 2-3 months.     Thank you,    Montserrat Nazario, RD, LD  If you would like to schedule or reschedule an appointment with the RD, please call [phone]    Nutrition Goals  1) Continue tracking nutrition in guilherme. Aim for <1500 cole/day.   2) Consume 90+ gm protein daily.   3) Add in a serving of fruit or vegetable with at least one meal daily   4) Increase fluid intake, aim for 96 oz/day.  5) Increase physical activity as able  6) Have weight checked in clinic     Protein Sources   [email or web address]       Meal Replacement Shake Options:   *Protein Shake Criteria: no more than 210 Calories, at least 20 grams of protein, and less than 10 grams of sugar   [facility] smoothie (160 Calories, 20 g protein)   Premier Protein (160 Calories, 30 g protein)  Slim Fast Advanced Nutrition (180 Calories, 20 g protein)  Muscle Milk, lactose-free, 17 oz bottle (210 Calories, 30 g protein)  Integrated Supplements, no artificial sugars (110 Calories, 20 g protein)  Boost/Ensure Max (160 calories, 30 gm protein)   Fairlife Protein Shakes (160-230 calories, 26-42 gm protein)  Aldi's Elevation Protein Powder (180 calories, 30 gm protein)   Orgain Protein Shakes (130-160 calories, 20-26 gm protein)     Meal Replacement Bar Options:  [facility] Protein Shake (160 Calories, 15 g protein)  Quest Protein Bars (190 Calories, 20 g protein)  Built Bar (170 Calories, 15-20 g protein)  One Protein Bar (210 calories, 20 g protein)  [place] Signature Protein Bar ([employer]co) (190 Calories, 21 g protein)  Pure Protein Bars (180 Calories, 21 g protein)      At Home Exercise Resources  ACE Fitness Library - Exercises by Muscle Group  https://www.[employer].org/resources/everyone/exercise-library/    Channels with Exercise Modifications:  Coach Geller (strengthening) https://www.[email or web address]/@Howard [email or web address] (strengthening): https://www.youtube.com/channel/QNNLN5-YPZGr28ST-q5HAseP  Yoga with Zelinda:  https://www.youtube.com/@yogawithzelinda      Handouts Relating to Exercise :    1.     Learning About Being Physically Active     2.      Muscle Conditionin Exercises    3.     Resistance Training with Free Weights: 3 Exercises    4.      Resistance Training with Surgical Tubin Exercises    5.     Stretchin Exercises    6.     Seated Exercises for Arms and Legs: 11 Exercises      COMPREHENSIVE WEIGHT MANAGEMENT PROGRAM  VIRTUAL SUPPORT GROUPS    At [facility], our Comprehensive Weight Management program offers on-line support groups for patients who are working on weight loss and considering, preparing for, or have had weight loss surgery.     There is no cost for this opportunity.  You are invited to attend the?Virtual Support Groups?provided by any of the following locations:    [facility] via Microsoft Teams with Karen Hendrix RN  2.   [place] via [employer] with Bebeto Liu, PhD, LP  3.   [place] via [employer] with Brianda Short RN  4.   [facility] via a Zoom Meeting with JESÚS Rose    The following Support Group information can also be found on our website:  [email or web address]-loss-[email or web address]      [facility] Weight Loss Surgery Support Group  The support group is a patient-lead forum that meets monthly to share experiences, encouragement and education. It is open to those who have had weight loss surgery, are scheduled for surgery, or are considering surgery.   WHEN: This group meets on the  of each month from 5:00PM - 6:00PM virtually using Microsoft Teams.   FACILITATOR: Led by Karen Abdullahi RD, LD, RN, the program's Clinical Coordinator.   TO REGISTER: Please contact the clinic via [employer] or call the nurse line directly at [phone] to inform our staff that you would like an invite sent to you and to let us know the email you would like the invite sent to.  "Prior to the meeting, a link with directions on how to join the meeting will be sent to you.    2023 and 2024 Meetings   December 20  January 17  February 21  March 20  April 17  May 15  Nayely 19      [facility] Bariatric Care Support Group?  This is open to all pre- and post- operative bariatric surgery patients as well as their support system.   WHEN: This group meets the 3rd Tuesday of each month from 6:30 PM - 8:00 PM virtually using Microsoft Teams.   FACILITATOR: Led by Bebeto Liu, Ph.D who is a Licensed Psychologist with the [facility] Comprehensive Weight Management Program.   TO REGISTER: Please send an email to Bebeto Liu, Ph.D., LP at?[email or web address]?if you would like an invitation to the group. Prior to the meeting, a link with directions on how to join the meeting will be sent to you.    2023 and 2024 Meetings  December 19 January 16: \"Medication Management and Bariatric Surgery\", Ramya Chaparro, PharmD, Pharmacy Resident at [facility]  February 20: \"A Bariatric Surgery Patient's Perspective\", WILI Farrar, [facility], Behavioral Health Clinician at [facility]  March 19  April 16  May 21  Nayely 18: \"Nutritional Labeling\", Dietitian speaker to be announced.  November 19: \"Holiday Eating\", Dietitian speaker to be announced.    [facility] Post-Operative Bariatric Surgery Support Group  This is a support group for [facility] bariatric patients (and those external to [facility]) who have had bariatric surgery and are at least 3 months post-surgery.  WHEN: This support group meets the 4th Wednesday of the month from 11:00 AM - 12:00 PM virtually using Microsoft Teams.   FACILITATOR: Led by Certified Bariatric Nurse, Brianda Short RN.   TO REGISTER: Please send an email to Brianda at [email or web address] if you " "would like an invitation to the group.  Prior to the meeting, a link with directions on how to join the meeting will be sent to you.    2023 and 2024 Meetings  December 27  January 24  February 28  March 27  April 24  May 22  Nayely 26    [facility] Healthy Lifestyle Group?  This is a 60 minute virtual coaching group for those who want to lead a healthier lifestyle. Come together to set goals and overcome barriers in a supportive group environment. We will address the four pillars of health: nutrition, exercise, sleep and emotional well-being.  This group is highly recommended for those who are participating in the 24 week Healthy Lifestyle Plan and our Health Coaching sessions.  WHEN: This group meets the 1st Friday of the month, 12:30 PM - 1:30 PM online, via a zoom meeting.    FACILITATOR: Led by National Board Certified Health and , Brianda Miller [facility].   TO REGISTER: Please call the Call Center at [phone] to register.  You will get an appointment to attend in [facility]. Fifteen minutes prior to the meeting, complete the e-check in and you will get the link to join the meeting.    There is no charge to attend this group and space is limited.     2023 and 2024 Meetings  December 1: \"Let's Talk\" (guided discussion on our wins and challenges)  January 5: \"New Years Vision: Manifest your Best 2024!\" (guided imagery,  journaling and discussion)  February 2: \"Let's Talk\"  March 1: \"10 Percent Happier\" by Juan Jose Munoz (Book Bites - a guided discussion on the nuggets of wisdom from favorite wellness books, no need to read the book but highly encouraged)  April 5: \"Let's Talk\"  May 3: \"Essentialism: The Disciplined Pursuit of Less\" by Glen Alex (Book Bites discussion)  June 7: \"Let's Talk\"  July 5: NO MEETING, off for the 4th of July Holiday  August 2: \"The Blue Zones, Secrets for Living a Longer Life\" by Juan Jose Hopkins (Book Bites " discussion)

## 2024-05-23 NOTE — PROGRESS NOTES
"Video-Visit Details    Type of service:  Video Visit    Video Start Time: 12:36 PM   Video End Time: 12:45 PM    Originating Location (pt. Location): Home    Distant Location (provider location): Offsite (providers home) [facility] [place]     Platform used for Video Visit: [employer]        Weight Management Nutrition Consultation    Quan \"Danie\" I Joey Davidson is a 18 year old male presents today for return weight management nutrition consultation.  Patient referred by Nirmala Torre PA-C on October 19, 2023.    Patient with Co-morbidities of obesity including:      10/12/2023     9:22 AM   --   I have the following health issues associated with obesity Pre-Diabetes    High Blood Pressure   I have the following symptoms associated with obesity Knee Pain    Back Pain    Fatigue    Hip Pain   Only has one kidney.     Anthropometrics:  Estimated body mass index is 54.92 kg/m  as calculated from the following:    Height as of 10/17/23: 1.613 m (5' 3.5\").    Weight as of 10/17/23: 142.9 kg (315 lb).      Estimated body mass index is 48.47 kg/m  as calculated from the following:    Height as of this encounter: 1.6 m (5' 3\").    Weight as of 5/7/24: 124.1 kg (273 lb 9.6 oz). - Pt states he has not checked weight since end of March.     Has a scale at home, but doesn't think it is accurate. Planning to check weight at his primary clinic.     Medications for Weight Loss:  Wegovy     NUTRITION HISTORY  Food allergies: None  Food intolerances: seafood  Vitamin/Mineral Supplements: Multivitmain     Previous methods of diet modification for weight loss: Was 260lbs in high school. Was able to lose from 260lbs to 214lb wyatt year through going to the gym daily and low calorie diet. Was not sustainble through the pandemic, and regain weight. More recently had followed 36 hr fast followed by 12 h eating window, consistently for a month. Lost ~10-15 lbs while following. 2.5 years ago, stopped " "drinking all calorie-containing beverages. Also cut out eating chips and most desserts.   Tracked intake in guilherme called \"NutriFoap ABck.\" Consuming 9740-5507 cole/day, protein:  gm, carb 192-220 gm.     1/12/24 - Injured shoulder which had significantly reduced his physical activity, however is feeling better now. He reports eating 3 meals daily, much smaller portions. No longer following multiple day fasts.       2/5/24 - has tracked nutritional intake for past 25 days. He is not sure if home scale is accurate, but feels like he is losing weight.   Protein intake ranges from 50-70 gm protein daily, some days is only getting 15-20 gm protein. Has been skipping breakfast, otherwise reports he would be over calorie goal.     3/7/24- Switched to Wegovy. Endorses many benefits from starting this med - less appetite, smaller portions, less reflux and mucous.  Increased rate of weight loss this month, losing avg 2-3 lbs per week (~1%).  Calories 0191-1515 cole/day. Feels great at this level. Would have to force intake for greater calorie intake. Consuming  gm protein daily. Going to the gym with brother now as well. Helping to motivate family to make healthier diet change as they are seeing his results.     4/30/24 - Continues Wegovy, on 1.7 mg dose currently. Reports diet/lifestyle changes has been going very well overall. He does notice he has not been watching protein intake as closely lately. Also lost Y membership, so has not been to the gym, however has increased walking to 7000+ steps daily.   On streak of tracking for 115 days consecutively. Limiting calories to 1500 cole/day, typically consuming 1300 cole/day. Typically doing two meals and two snacks. Limits snacks to 300 cole/day.   Reports consuming more fruit/veggies as family is making changes too.  Significantly cut down on bread, rice, fast-food   Identifies needing to work on increasing water intake.  HgbA1c 5.2 on 4/5/24.    Today - Doing well with " incorporating veggies at each meal period, working on fruits as able. Started a daily multivitamin.     Diet Recall:  Breakfast: skip   Lunch: lean ground beef/turkey/eggs and steamer bag of veggies   Dinner: sister's making - Chicken tinga; steak and potatoes        Progress Towards Previous Goals:  1) Continue tracking nutrition in guilherme. Aim for <1500 cole/day. - Met, continues to track intake, most days of the week 1300 cole/day   2) Consume 90+ gm protein daily. - Met most days   3) Add in a serving of fruit or vegetable with at least one meal daily - Incorporating corn, mixed veggies, green beans.   4) Consider starting a daily multivitamin - Met, continues   5) Increase fluid intake, aim for 96 oz/day. - Improving   6) Increase physical activity outside of the gym  - Not met     Physical Activity:  3500+ steps/day.   No longer has gym membership.    Nutrition Prescription  Recommended energy/nutrient modification.  8221-2246 calories/day    Nutrition Diagnosis  Obesity r/t long history of positive energy balance aeb BMI >30. - Ongoing    Nutrition Intervention  Materials/education provided on hypocaloric diet for weight loss.   Reviewed progress towards previous goals. Praised pt on the progress he has made with diet/lifestyle and reducing HgbA1c.   Encouraged continuing to focus on high-nutrient value foods within calorie deficit.   Encouraged increased fluid intake, he plans to down load and guilherme to help with this.   Encouraged increased physical activity.   Patient demonstrates understanding.  Co-developed goals to work towards.   Provided pt with list of goals and resources below via Brain[employer].     Expected Engagement: good  Follow-Up Plans:      Nutrition Goals  1) Continue tracking nutrition in guilherme. Aim for <1500 cole/day.   2) Consume 90+ gm protein daily.   3) Add in a serving of fruit or vegetable with at least one meal daily   4) Increase fluid intake, aim for 96 oz/day.  5) Increase physical activity as  able  6) Have weight checked in clinic     Protein Sources   [email or web address]       Meal Replacement Shake Options:   *Protein Shake Criteria: no more than 210 Calories, at least 20 grams of protein, and less than 10 grams of sugar   [facility] smoothie (160 Calories, 20 g protein)   Premier Protein (160 Calories, 30 g protein)  Slim Fast Advanced Nutrition (180 Calories, 20 g protein)  Muscle Milk, lactose-free, 17 oz bottle (210 Calories, 30 g protein)  Integrated Supplements, no artificial sugars (110 Calories, 20 g protein)  Boost/Ensure Max (160 calories, 30 gm protein)   Fairlife Protein Shakes (160-230 calories, 26-42 gm protein)  Aldi's Elevation Protein Powder (180 calories, 30 gm protein)   Orgain Protein Shakes (130-160 calories, 20-26 gm protein)     Meal Replacement Bar Options:  [facility] Protein Shake (160 Calories, 15 g protein)  Quest Protein Bars (190 Calories, 20 g protein)  Built Bar (170 Calories, 15-20 g protein)  One Protein Bar (210 calories, 20 g protein)  [place] Signature Protein Bar (Costco) (190 Calories, 21 g protein)  Pure Protein Bars (180 Calories, 21 g protein)      At Home Exercise Resources  [employer] Library - Exercises by Muscle Group  https://www.LemonStand..org/resources/everyone/exercise-library/    Channels with Exercise Modifications:  Coach Geller (strengthening) [email or web address]  HASfit (strengthening): https://www.youKiteDeskube.com/channel/SXADG6-TXOEc38IH-b3WFscA  Yoga with Zelinda: https://[email or web address] [email or web address]      Handouts Relating to Exercise :    1.     Learning About Being Physically Active     2.      Muscle Conditionin Exercises    3.     Resistance Training with Free Weights: 3 Exercises    4.      Resistance Training with Surgical Tubin Exercises    5.     Stretchin Exercises    6.     Seated Exercises for Arms and Legs: 11 Exercises      Follow-Up:  2-3 months, PRN    Time spent with patient: 9  minutes.  Montserrat Nazario RD, LD

## 2024-05-23 NOTE — LETTER
"5/23/2024       RE: Quan Davidson  [address]  [place] [address]     Dear Colleague,    Thank you for referring your patient, Quan Davidson, to the [facility] [place] [facility]. Please see a copy of my visit note below.    Video-Visit Details    Type of service:  Video Visit    Video Start Time: 12:36 PM   Video End Time: 12:45 PM    Originating Location (pt. Location): Home    Distant Location (provider location): Offsite (providers home) [facility] [place]     Platform used for Video Visit: ObjectWay        Weight Management Nutrition Consultation    Quan \"Danie\" FLORA Davidson is a 18 year old male presents today for return weight management nutrition consultation.  Patient referred by Nirmala Torre PA-C on October 19, 2023.    Patient with Co-morbidities of obesity including:      10/12/2023     9:22 AM   --   I have the following health issues associated with obesity Pre-Diabetes    High Blood Pressure   I have the following symptoms associated with obesity Knee Pain    Back Pain    Fatigue    Hip Pain   Only has one kidney.     Anthropometrics:  Estimated body mass index is 54.92 kg/m  as calculated from the following:    Height as of 10/17/23: 1.613 m (5' 3.5\").    Weight as of 10/17/23: 142.9 kg (315 lb).      Estimated body mass index is 48.47 kg/m  as calculated from the following:    Height as of this encounter: 1.6 m (5' 3\").    Weight as of 5/7/24: 124.1 kg (273 lb 9.6 oz). - Pt states he has not checked weight since end of March.     Has a scale at home, but doesn't think it is accurate. Planning to check weight at his primary clinic.     Medications for Weight Loss:  Wegovy     NUTRITION HISTORY  Food allergies: None  Food intolerances: seafood  Vitamin/Mineral Supplements: Multivitmain     Previous methods of diet modification for weight loss: Was 260lbs " "in high school. Was able to lose from 260lbs to 214lb wyatt year through going to the gym daily and low calorie diet. Was not sustainble through the pandemic, and regain weight. More recently had followed 36 hr fast followed by 12 h eating window, consistently for a month. Lost ~10-15 lbs while following. 2.5 years ago, stopped drinking all calorie-containing beverages. Also cut out eating chips and most desserts.   Tracked intake in guilherme called \"Nutricheck.\" Consuming 9866-9241 cole/day, protein:  gm, carb 192-220 gm.     1/12/24 - Injured shoulder which had significantly reduced his physical activity, however is feeling better now. He reports eating 3 meals daily, much smaller portions. No longer following multiple day fasts.       2/5/24 - has tracked nutritional intake for past 25 days. He is not sure if home scale is accurate, but feels like he is losing weight.   Protein intake ranges from 50-70 gm protein daily, some days is only getting 15-20 gm protein. Has been skipping breakfast, otherwise reports he would be over calorie goal.     3/7/24- Switched to Wegovy. Endorses many benefits from starting this med - less appetite, smaller portions, less reflux and mucous.  Increased rate of weight loss this month, losing avg 2-3 lbs per week (~1%).  Calories 2844-0471 cole/day. Feels great at this level. Would have to force intake for greater calorie intake. Consuming  gm protein daily. Going to the gym with brother now as well. Helping to motivate family to make healthier diet change as they are seeing his results.     4/30/24 - Continues Wegovy, on 1.7 mg dose currently. Reports diet/lifestyle changes has been going very well overall. He does notice he has not been watching protein intake as closely lately. Also lost Y membership, so has not been to the gym, however has increased walking to 7000+ steps daily.   On streak of tracking for 115 days consecutively. Limiting calories to 1500 cole/day, " typically consuming 1300 cole/day. Typically doing two meals and two snacks. Limits snacks to 300 cole/day.   Reports consuming more fruit/veggies as family is making changes too.  Significantly cut down on bread, rice, fast-food   Identifies needing to work on increasing water intake.  HgbA1c 5.2 on 4/5/24.    Today - Doing well with incorporating veggies at each meal period, working on fruits as able. Started a daily multivitamin.     Diet Recall:  Breakfast: skip   Lunch: lean ground beef/turkey/eggs and steamer bag of veggies   Dinner: sister's making - Chicken tinga; steak and potatoes        Progress Towards Previous Goals:  1) Continue tracking nutrition in guilherme. Aim for <1500 cole/day. - Met, continues to track intake, most days of the week 1300 cole/day   2) Consume 90+ gm protein daily. - Met most days   3) Add in a serving of fruit or vegetable with at least one meal daily - Incorporating corn, mixed veggies, green beans.   4) Consider starting a daily multivitamin - Met, continues   5) Increase fluid intake, aim for 96 oz/day. - Improving   6) Increase physical activity outside of the gym  - Not met     Physical Activity:  3500+ steps/day.   No longer has gym membership.    Nutrition Prescription  Recommended energy/nutrient modification.  7105-3196 calories/day    Nutrition Diagnosis  Obesity r/t long history of positive energy balance aeb BMI >30. - Ongoing    Nutrition Intervention  Materials/education provided on hypocaloric diet for weight loss.   Reviewed progress towards previous goals. Praised pt on the progress he has made with diet/lifestyle and reducing HgbA1c.   Encouraged continuing to focus on high-nutrient value foods within calorie deficit.   Encouraged increased fluid intake, he plans to down load and guilherme to help with this.   Encouraged increased physical activity.   Patient demonstrates understanding.  Co-developed goals to work towards.   Provided pt with list of goals and resources below  via [employer].     Expected Engagement: good  Follow-Up Plans:      Nutrition Goals  1) Continue tracking nutrition in guilherme. Aim for <1500 cole/day.   2) Consume 90+ gm protein daily.   3) Add in a serving of fruit or vegetable with at least one meal daily   4) Increase fluid intake, aim for 96 oz/day.  5) Increase physical activity as able  6) Have weight checked in clinic     Protein Sources   [email or web address]       Meal Replacement Shake Options:   *Protein Shake Criteria: no more than 210 Calories, at least 20 grams of protein, and less than 10 grams of sugar   [facility] smoothie (160 Calories, 20 g protein)   Premier Protein (160 Calories, 30 g protein)  Slim Fast Advanced Nutrition (180 Calories, 20 g protein)  Muscle Milk, lactose-free, 17 oz bottle (210 Calories, 30 g protein)  Integrated Supplements, no artificial sugars (110 Calories, 20 g protein)  Boost/Ensure Max (160 calories, 30 gm protein)   Fairlife Protein Shakes (160-230 calories, 26-42 gm protein)  Aldi's Elevation Protein Powder (180 calories, 30 gm protein)   Orgain Protein Shakes (130-160 calories, 20-26 gm protein)     Meal Replacement Bar Options:  [facility] Protein Shake (160 Calories, 15 g protein)  Quest Protein Bars (190 Calories, 20 g protein)  Built Bar (170 Calories, 15-20 g protein)  One Protein Bar (210 calories, 20 g protein)  [place] Signature Protein Bar ([employer]co) (190 Calories, 21 g protein)  Pure Protein Bars (180 Calories, 21 g protein)      At Home Exercise Resources  [employer] Library - Exercises by Muscle Group  https://www.BuildingLayer.org/resources/everyone/exercise-library/    Channels with Exercise Modifications:  Coach Geller (strengthening) [email or web address]  HASfit (strengthening): https://www.youtube.com/channel/GIHDO9-HIIXf82HV-v6KJdbO  Yoga with Zelinda: https://www.[email or web address].com/@yogawithzelinda      Handouts Relating to Exercise :    1.     Learning About Being Physically Active      2.      Muscle Conditionin Exercises    3.     Resistance Training with Free Weights: 3 Exercises    4.      Resistance Training with Surgical Tubin Exercises    5.     Stretchin Exercises    6.     Seated Exercises for Arms and Legs: 11 Exercises      Follow-Up:  2-3 months, PRN    Time spent with patient: 9 minutes.  Montserrat Nazario RD, LD

## 2024-05-23 NOTE — NURSING NOTE
Is the patient currently in the state of [place]? YES    Visit mode:VIDEO    If the visit is dropped, the patient can be reconnected by: VIDEO VISIT: Text to cell phone:   Telephone Information:   Mobile [phone]       Will anyone else be joining the visit? NO  (If patient encounters technical issues they should call [phone])    How would you like to obtain your AVS? MyChart    Are changes needed to the allergy or medication list? No, patient reported no changes to e-check in information for visit.    Are refills needed on medications prescribed by this physician? NO    Reason for visit: RECHECK    Violet VEGA

## 2024-05-27 ENCOUNTER — MYC MEDICAL ADVICE (OUTPATIENT)
Dept: ENDOCRINOLOGY | Facility: CLINIC | Age: 19
End: 2024-05-27
Payer: COMMERCIAL

## 2024-05-27 DIAGNOSIS — E66.813 CLASS 3 SEVERE OBESITY WITH SERIOUS COMORBIDITY AND BODY MASS INDEX (BMI) OF 50.0 TO 59.9 IN ADULT, UNSPECIFIED OBESITY TYPE (H): Primary | ICD-10-CM

## 2024-05-27 DIAGNOSIS — E66.01 CLASS 3 SEVERE OBESITY WITH SERIOUS COMORBIDITY AND BODY MASS INDEX (BMI) OF 50.0 TO 59.9 IN ADULT, UNSPECIFIED OBESITY TYPE (H): Primary | ICD-10-CM

## 2024-05-28 ENCOUNTER — MYC MEDICAL ADVICE (OUTPATIENT)
Dept: INTERNAL MEDICINE | Facility: CLINIC | Age: 19
End: 2024-05-28
Payer: COMMERCIAL

## 2024-05-28 ENCOUNTER — PATIENT OUTREACH (OUTPATIENT)
Dept: CARE COORDINATION | Facility: CLINIC | Age: 19
End: 2024-05-28
Payer: COMMERCIAL

## 2024-05-28 NOTE — TELEPHONE ENCOUNTER
MYC MSG sent to pt regarding blood work request.       Malvin Hampton Jr., CMA on 5/28/2024 at 12:50 PM

## 2024-05-28 NOTE — PROGRESS NOTES
Clinic Care Coordination Contact  Care Coordination Clinician Chart Review    Situation: Patient chart reviewed by Care Coordinator.       Background: Care Coordination Program started: 6/26/2023. Initial assessment completed and patient-centered care plan(s) were developed with participation from patient. Lead CC handed patient off to CHW for continued outreaches.       Assessment: Per chart review, patient outreach completed by CC CHW on 5/22/24.  Patient is actively working to accomplish goal(s). Patient's goal(s) appropriate and relevant at this time. Patient is not due for updated Plan of Care.  Assessments will be completed annually or as needed/with change of patient status.      Care Plan: General       Problem: HP GENERAL PROBLEM       Long-Range Goal: I would like to work with the Bariatric Clinic to assist me with weight loss.       Start Date: 6/28/2023 Expected End Date: 2/28/2024    This Visit's Progress: 80% Recent Progress: 70%    Priority: High    Note:     Barriers: elevated BMI  Strengths: strong advocate for himself.  Patient expressed understanding of goal: yes  Action steps to achieve this goal:  1. I understand Dr. Louis has sent  referral to the Bariatric Clinic on my behalf.   2. I understand a representative from the Bariatric Clinic will contact me directly to scheduled an intake appointment.   3. I will report progress towards this goal at scheduled outreach telephone calls from my Care Coordination team.      Discussed- doing well, looking for prescribed scale  Discussed, next appt is 5/7  Discussed 3/15- Next appt scheduled 4/1/2024  Discussed on 2/16/24  Discussed with sister 3/7 next appt  Discussed on 1/18/24 11/22 discussed, next PCP appt 11/28, next weight mgmt visit 1/12 and 1/16/24  Discussed - completed virtual visit  Discussed on 8/2/23                                 Plan/Recommendations: The patient will continue working with Care Coordination to achieve goal(s) as above. CHW  will continue outreaches at minimum every 30 days and will involve Lead CC as needed or if patient is ready to move to Maintenance. Lead CC will continue to monitor CHW outreaches and patient's progress to goal(s) every 6 weeks.     Plan of Care updated and sent to patient: No         15-New-2022 01:36

## 2024-05-28 NOTE — TELEPHONE ENCOUNTER
Please let Jose Daniel know I typically only do bloodwork 1-2x a year. He should follow up with weight management or nephrology to see if they have other recommendations.

## 2024-05-30 ENCOUNTER — TELEPHONE (OUTPATIENT)
Dept: ENDOCRINOLOGY | Facility: CLINIC | Age: 19
End: 2024-05-30
Payer: COMMERCIAL

## 2024-05-30 NOTE — TELEPHONE ENCOUNTER
Patient confirmed scheduled appointment:  Date: 7/23/24  Time: 12 pm  Visit type: RET MWM Nutrition  Provider: Montserrat Nazario  Location: virtual  Testing/imaging: n/a  Additional notes: Pt confirmed will be in [place] for appointment

## 2024-06-03 RX ORDER — SEMAGLUTIDE 2.4 MG/.75ML
2.4 INJECTION, SOLUTION SUBCUTANEOUS WEEKLY
Qty: 3 ML | Refills: 0 | Status: SHIPPED | OUTPATIENT
Start: 2024-06-03 | End: 2024-07-03

## 2024-06-13 ENCOUNTER — TRANSFERRED RECORDS (OUTPATIENT)
Dept: HEALTH INFORMATION MANAGEMENT | Facility: CLINIC | Age: 19
End: 2024-06-13
Payer: COMMERCIAL

## 2024-06-21 ENCOUNTER — PATIENT OUTREACH (OUTPATIENT)
Dept: CARE COORDINATION | Facility: CLINIC | Age: 19
End: 2024-06-21
Payer: COMMERCIAL

## 2024-06-21 NOTE — PROGRESS NOTES
Clinic Care Coordination Contact  Community Health Worker Follow Up    Care Gaps:     Health Maintenance Due   Topic Date Due    ASTHMA ACTION PLAN  Never done    Pneumococcal Vaccine: Pediatrics (0 to 5 Years) and At-Risk Patients (6 to 64 Years) (1 of 2 - PCV) Never done    HEPATITIS B IMMUNIZATION (1 of 3 - 19+ 3-dose series) Never done    COVID-19 Vaccine (1 - 2023-24 season) Never done    HPV IMMUNIZATION (3 - Male 3-dose series) 06/28/2024       Care Gaps Last addressed on 6/21/2024    Care Plan:   Care Plan: General       Problem: HP GENERAL PROBLEM       Long-Range Goal: I would like to work with the Bariatric Clinic to assist me with weight loss.       Start Date: 6/28/2023 Expected End Date: 2/28/2024    This Visit's Progress: 80% Recent Progress: 80%    Priority: High    Note:     Barriers: elevated BMI  Strengths: strong advocate for himself.  Patient expressed understanding of goal: yes  Action steps to achieve this goal:  1. I understand Dr. Lousi has sent  referral to the Bariatric Clinic on my behalf.   2. I understand a representative from the Bariatric Clinic will contact me directly to scheduled an intake appointment.   3. I will report progress towards this goal at scheduled outreach telephone calls from my Care Coordination team.      Discussed 6/21- chw scheduled follow up call on 6/24/24 to address patient's questions, next weight loss clinic appt is scheduled 7/23/2024  Discussed- doing well, looking for prescribed scale  Discussed, next appt is 5/7  Discussed 3/15- Next appt scheduled 4/1/2024  Discussed on 2/16/24  Discussed with sister 3/7 next appt  Discussed on 1/18/24 11/22 discussed, next PCP appt 11/28, next weight mgmt visit 1/12 and 1/16/24  Discussed - completed virtual visit  Discussed on 8/2/23                              Intervention and Education during outreach: Supportive Listening, CHW scheduled follow up CC RN call on 6/24/24 to address patient's questions.    CHW spoke to  Jose Daniel today for monthly CC outreach call, patient states he is doing well- He is wondering about scheduling a PCP appointment to come in a weigh himself- Patient states he does not get a good reading on scale due to unlevel floor in residence.  CHW looked at PCP schedule, next opening is later in July, CHW scheduled follow up CC RN call to discuss other options.  Next weight loss clinic appt is scheduled for 7/23/2024    CHW Plan: Continue with monthly outreach call to discuss, support and update CC goal progression    Next CHW outreach call: 7/17/2024    Siri EVANS  Community Health Worker  [facility] Care Coordination  Stacey [email or web address]  [email or web address]  Office: [phone]

## 2024-06-24 ENCOUNTER — PATIENT OUTREACH (OUTPATIENT)
Dept: NURSING | Facility: CLINIC | Age: 19
End: 2024-06-24
Payer: COMMERCIAL

## 2024-06-24 DIAGNOSIS — E66.813 CLASS 3 SEVERE OBESITY WITH SERIOUS COMORBIDITY AND BODY MASS INDEX (BMI) OF 50.0 TO 59.9 IN ADULT, UNSPECIFIED OBESITY TYPE (H): ICD-10-CM

## 2024-06-24 DIAGNOSIS — E66.01 CLASS 3 SEVERE OBESITY WITH SERIOUS COMORBIDITY AND BODY MASS INDEX (BMI) OF 50.0 TO 59.9 IN ADULT, UNSPECIFIED OBESITY TYPE (H): ICD-10-CM

## 2024-06-24 NOTE — TELEPHONE ENCOUNTER
Please let Jose Daniel know that I will be able to write for 3 months which should give him enough time to find a provider. It maybe difficult for insurance coverage, so I would not be able to help on that end. I wish him luck!

## 2024-06-24 NOTE — PROGRESS NOTES
Clinic Care Coordination Contact  Follow Up Progress Note      Assessment: CCC RN spoke with patient today per his request. Patient reported he is going to be moving back to [place] in two weeks, and wondered if if Dr. Little would continue to prescribe his medications until he finds a new provider. Writer attempted to look for an appointment with his PCP, but none could be found during the next two weeks. Writer will send PCP a note to see if she has any availability in the next two weeks to see patient.   Patient also requesting a possible increase in his Wegovy related to increased hunger. Encouraged him to contact his Weight Loss Clinic provider to determine if this is a possibility. He agreed to send her a message a message through [employer]. No other needs or concerns.     Care Gaps:    Health Maintenance Due   Topic Date Due    ASTHMA ACTION PLAN  Never done    Pneumococcal Vaccine: Pediatrics (0 to 5 Years) and At-Risk Patients (6 to 64 Years) (1 of 2 - PCV) Never done    HEPATITIS B IMMUNIZATION (1 of 3 - 19+ 3-dose series) Never done    COVID-19 Vaccine (1 - 2023-24 season) Never done    HPV IMMUNIZATION (3 - Male 3-dose series) 06/28/2024           Care Plans  Care Plan: General       Problem: HP GENERAL PROBLEM       Long-Range Goal: I would like to work with the Bariatric Clinic to assist me with weight loss.       Start Date: 6/28/2023 Expected End Date: 2/28/2024    Recent Progress: 80%    Priority: High    Note:     Barriers: elevated BMI  Strengths: strong advocate for himself.  Patient expressed understanding of goal: yes  Action steps to achieve this goal:  1. I understand Dr. Louis has sent  referral to the Bariatric Clinic on my behalf.   2. I understand a representative from the Bariatric Clinic will contact me directly to scheduled an intake appointment.   3. I will report progress towards this goal at scheduled outreach telephone calls from my Care Coordination team.      Discussed on  6/24/24  Discussed 6/21- chw scheduled follow up call on 6/24/24 to address patient's questions, next weight loss clinic appt is scheduled 7/23/2024  Discussed- doing well, looking for prescribed scale  Discussed, next appt is 5/7  Discussed 3/15- Next appt scheduled 4/1/2024  Discussed on 2/16/24  Discussed with sister 3/7 next appt  Discussed on 1/18/24 11/22 discussed, next PCP appt 11/28, next weight mgmt visit 1/12 and 1/16/24  Discussed - completed virtual visit  Discussed on 8/2/23                              Intervention/Education provided during outreach: Discussed the importance of taking his medications as directed. Encouraged him to attend all upcoming appointments. Encouraged him to contact Care Coordination for any additional needs or concerns.               Plan: CCC RN will continue to monitor, support patient with current goal and will be available to assist as nursing needs arise.     Care Coordinator will dis-enroll patient from Care Coordination in two weeks as he will no longer be living in [place].

## 2024-07-03 ENCOUNTER — VIRTUAL VISIT (OUTPATIENT)
Dept: ENDOCRINOLOGY | Facility: CLINIC | Age: 19
End: 2024-07-03
Payer: COMMERCIAL

## 2024-07-03 VITALS — WEIGHT: 265 LBS | BODY MASS INDEX: 46.95 KG/M2 | HEIGHT: 63 IN

## 2024-07-03 DIAGNOSIS — E66.813 CLASS 3 SEVERE OBESITY WITH SERIOUS COMORBIDITY AND BODY MASS INDEX (BMI) OF 50.0 TO 59.9 IN ADULT, UNSPECIFIED OBESITY TYPE (H): ICD-10-CM

## 2024-07-03 DIAGNOSIS — E66.01 CLASS 3 SEVERE OBESITY WITH SERIOUS COMORBIDITY AND BODY MASS INDEX (BMI) OF 50.0 TO 59.9 IN ADULT, UNSPECIFIED OBESITY TYPE (H): ICD-10-CM

## 2024-07-03 PROCEDURE — 99214 OFFICE O/P EST MOD 30 MIN: CPT | Mod: 95 | Performed by: PHYSICIAN ASSISTANT

## 2024-07-03 RX ORDER — SEMAGLUTIDE 2.4 MG/.75ML
2.4 INJECTION, SOLUTION SUBCUTANEOUS WEEKLY
Qty: 3 ML | Refills: 0 | OUTPATIENT
Start: 2024-07-03

## 2024-07-03 RX ORDER — SEMAGLUTIDE 2.4 MG/.75ML
2.4 INJECTION, SOLUTION SUBCUTANEOUS WEEKLY
Qty: 9 ML | Refills: 1 | Status: SHIPPED | OUTPATIENT
Start: 2024-07-03

## 2024-07-03 ASSESSMENT — PAIN SCALES - GENERAL: PAINLEVEL: NO PAIN (0)

## 2024-07-03 NOTE — LETTER
7/3/2024       RE: Quan Davidson  [address]  [place] [address]     Dear Colleague,    Thank you for referring your patient, Quan Davidson, to the [facility] WE[facility]NAGEMENT [facility] [place] [facility]. Please see a copy of my visit note below.      Return Medical Weight Management Note     Quan Davidson  MRN:  [id number]  :  2005  RINA:  7/3/2024    Dear NACHO AGUILAR DO,    I had the pleasure of seeing your patient Quan Davidson. He is a 19 year old male who I am continuing to see for treatment of obesity related to:        10/12/2023     9:22 AM   --   I have the following health issues associated with obesity Pre-Diabetes    High Blood Pressure   I have the following symptoms associated with obesity Knee Pain    Back Pain    Fatigue    Hip Pain       Assessment & Plan  Problem List Items Addressed This Visit    None  Visit Diagnoses       Class 3 severe obesity with serious comorbidity and body mass index (BMI) of 50.0 to 59.9 in adult, unspecified obesity type (H)        Relevant Medications    Semaglutide-Weight Management (WEGOVY) 2.4 MG/0.75ML pen           Weight mgmt follow up  Sees Nirmala Nelson for weight mgmt    Taking Wegovy and initially lost 50 lbs in the first 5 months  After 5 months started having an increase in hunger on the 1.7mg dose and has stopped losing and regained 3 lb in the last month  Now taking Wegovy 2.4mg which seems to be more effective.    Moving out of [facility] to [place] this week. He will be switching insurance but will have current insurance for the next 2-3 months    Continue Wegovy 2.4mg weekly. Will send 90 days supply to get through the move.     Follow up with our team as needed if in [place]    INTERVAL HISTORY:    Anti-obesity medication history    Current:   Wegovy 2.4mg    Past/Failed/contraindicated:   AOM discussed with Nirmala Nelson 23:   - Phentermine  currently contraindicated with HTN, and BP not at gaol of <140/80. Can consider in the future when BP at goal.   - Topiramate - concern for hx of nephrectomy, currently only has 1 working kidney. GFR  >90. Would need to discuss with nephrology prior to starting   - Naltrexone - can consider in the future. No contraindications. No hx of liver disease. Not taking opioids.     CURRENT WEIGHT:   265 lbs 0 oz 2-3 weeks ago    Initial Weight (lbs): 315 lbs     Cumulative weight loss (lbs): 50  Weight Loss Percentage: 15.87%        7/3/2024    10:05 AM   Changes and Difficulties   I have made the following changes to my diet since my last visit: Still trying to make it back to what i currently have for a diet   With regards to my diet, I am still struggling with: Amount of calories eaten   I have made the following changes to my activity/exercise since my last visit: None   With regards to my activity/exercise, I am still struggling with: Amount of activity done         MEDICATIONS:   Current Outpatient Medications   Medication Sig Dispense Refill    Semaglutide-Weight Management (WEGOVY) 2.4 MG/0.75ML pen Inject 2.4 mg Subcutaneous once a week 9 mL 1    azelastine (ASTELIN) 0.1 % nasal spray 2 sprays each nostril 1-2x daily as needed for nasal congestion (use nightly for first 2 week) 30 mL 11    cetirizine (ZYRTEC) 10 MG tablet Take 1-2 tablet by mouth daily, can increase dose every 24-48 hours up to 4 tablets daily as needed. 120 tablet 11    cromolyn sodium (NASALCROM) 5.2 MG/ACT nasal aerosol Spray 1 spray (1 mL) in nostril 2 times daily 13 mL 1    famotidine (PEPCID) 40 MG tablet Take 1 tablet (40 mg) by mouth 2 times daily 90 tablet 1    hydrocortisone 2.5 % ointment       hydrOXYzine (VISTARIL) 50 MG capsule Take 1 capsule (50 mg) by mouth 3 times daily as needed for itching 90 capsule 0    lisinopril (ZESTRIL) 10 MG tablet Take 1 tablet (10 mg) by mouth daily for 360 days 90 tablet 3    multivitamin (ONE-DAILY)  "tablet Take 1 tablet by mouth daily 90 tablet 3           7/3/2024    10:05 AM   Weight Loss Medication History Reviewed With Patient   Which weight loss medications are you currently taking on a regular basis? Wegovy   Are you having any side effects from the weight loss medication that we have prescribed you? No     PHYSICAL EXAM:  Objective   Ht 1.6 m (5' 2.99\")   Wt 120.2 kg (265 lb)   BMI 46.95 kg/m      Vitals - Patient Reported  Pain Score: No Pain (0)        GENERAL: alert and no distress  EYES: Eyes grossly normal to inspection.  No discharge or erythema, or obvious scleral/conjunctival abnormalities.  RESP: No audible wheeze, cough, or visible cyanosis.    SKIN: Visible skin clear. No significant rash, abnormal pigmentation or lesions.  NEURO: Cranial nerves grossly intact.  Mentation and speech appropriate for age.  PSYCH: Appropriate affect, tone, and pace of words        Sincerely,    Aylin Brandon PA-C      30 minutes spent by me on the date of the encounter doing chart review, history and exam, documentation and further activities per the note    "

## 2024-07-03 NOTE — PROGRESS NOTES
Return Medical Weight Management Note     Quan FLORA Davidson  MRN:  [id number]  :  2005  RINA:  7/3/2024    Dear NACHO AGUILAR DO,    I had the pleasure of seeing your patient Quan FLORA Davidson. He is a 19 year old male who I am continuing to see for treatment of obesity related to:        10/12/2023     9:22 AM   --   I have the following health issues associated with obesity Pre-Diabetes    High Blood Pressure   I have the following symptoms associated with obesity Knee Pain    Back Pain    Fatigue    Hip Pain       Assessment & Plan   Problem List Items Addressed This Visit    None  Visit Diagnoses       Class 3 severe obesity with serious comorbidity and body mass index (BMI) of 50.0 to 59.9 in adult, unspecified obesity type (H)        Relevant Medications    Semaglutide-Weight Management (WEGOVY) 2.4 MG/0.75ML pen           Weight mgmt follow up  Sees Nirmala Nelson for weight mgmt    Taking Wegovy and initially lost 50 lbs in the first 5 months  After 5 months started having an increase in hunger on the 1.7mg dose and has stopped losing and regained 3 lb in the last month  Now taking Wegovy 2.4mg which seems to be more effective.    Moving out of [facility] to [place] this week. He will be switching insurance but will have current insurance for the next 2-3 months    Continue Wegovy 2.4mg weekly. Will send 90 days supply to get through the move.     Follow up with our team as needed if in [place]    INTERVAL HISTORY:    Anti-obesity medication history    Current:   Wegovy 2.4mg    Past/Failed/contraindicated:   AOM discussed with Nirmala Nelson 23:   - Phentermine currently contraindicated with HTN, and BP not at gaol of <140/80. Can consider in the future when BP at goal.   - Topiramate - concern for hx of nephrectomy, currently only has 1 working kidney. GFR  >90. Would need to discuss with nephrology prior to starting   - Naltrexone - can consider in the future. No contraindications. No hx of  liver disease. Not taking opioids.     CURRENT WEIGHT:   265 lbs 0 oz 2-3 weeks ago    Initial Weight (lbs): 315 lbs     Cumulative weight loss (lbs): 50  Weight Loss Percentage: 15.87%        7/3/2024    10:05 AM   Changes and Difficulties   I have made the following changes to my diet since my last visit: Still trying to make it back to what i currently have for a diet   With regards to my diet, I am still struggling with: Amount of calories eaten   I have made the following changes to my activity/exercise since my last visit: None   With regards to my activity/exercise, I am still struggling with: Amount of activity done         MEDICATIONS:   Current Outpatient Medications   Medication Sig Dispense Refill    Semaglutide-Weight Management (WEGOVY) 2.4 MG/0.75ML pen Inject 2.4 mg Subcutaneous once a week 9 mL 1    azelastine (ASTELIN) 0.1 % nasal spray 2 sprays each nostril 1-2x daily as needed for nasal congestion (use nightly for first 2 week) 30 mL 11    cetirizine (ZYRTEC) 10 MG tablet Take 1-2 tablet by mouth daily, can increase dose every 24-48 hours up to 4 tablets daily as needed. 120 tablet 11    cromolyn sodium (NASALCROM) 5.2 MG/ACT nasal aerosol Spray 1 spray (1 mL) in nostril 2 times daily 13 mL 1    famotidine (PEPCID) 40 MG tablet Take 1 tablet (40 mg) by mouth 2 times daily 90 tablet 1    hydrocortisone 2.5 % ointment       hydrOXYzine (VISTARIL) 50 MG capsule Take 1 capsule (50 mg) by mouth 3 times daily as needed for itching 90 capsule 0    lisinopril (ZESTRIL) 10 MG tablet Take 1 tablet (10 mg) by mouth daily for 360 days 90 tablet 3    multivitamin (ONE-DAILY) tablet Take 1 tablet by mouth daily 90 tablet 3           7/3/2024    10:05 AM   Weight Loss Medication History Reviewed With Patient   Which weight loss medications are you currently taking on a regular basis? Wegovy   Are you having any side effects from the weight loss medication that we have prescribed you? No     PHYSICAL  "EXAM:  Objective    Ht 1.6 m (5' 2.99\")   Wt 120.2 kg (265 lb)   BMI 46.95 kg/m      Vitals - Patient Reported  Pain Score: No Pain (0)        GENERAL: alert and no distress  EYES: Eyes grossly normal to inspection.  No discharge or erythema, or obvious scleral/conjunctival abnormalities.  RESP: No audible wheeze, cough, or visible cyanosis.    SKIN: Visible skin clear. No significant rash, abnormal pigmentation or lesions.  NEURO: Cranial nerves grossly intact.  Mentation and speech appropriate for age.  PSYCH: Appropriate affect, tone, and pace of words        Sincerely,    Aylin Brandon PA-C      30 minutes spent by me on the date of the encounter doing chart review, history and exam, documentation and further activities per the note  "

## 2024-07-03 NOTE — TELEPHONE ENCOUNTER
pt needs appt, pharm called  LCV:11-16-23 FYI to clinic/scheduling   (addressed in -25-24 my chart)    Received refill request for Semaglutide-Weight Management (WEGOVY) 2.4 MG/0.75ML pen . Patient needs appointment scheduled prior to any refills. Clinic Coordinator notified and will follow up with the patient as appropriate. The pharmacy has been notified that the medication will not be refilled prior to an appointment being scheduled.

## 2024-07-03 NOTE — NURSING NOTE
Current patient location:  [place]    Is the patient currently in the state of [place]? YES    Visit mode:VIDEO    If the visit is dropped, the patient can be reconnected by: VIDEO VISIT: Text to cell phone:   Telephone Information:   Mobile [phone]       Will anyone else be joining the visit? NO  (If patient encounters technical issues they should call [phone]    How would you like to obtain your AVS? MyChart    Are changes needed to the allergy or medication list? No    Are refills needed on medications prescribed by this physician? NO, but would like a refill on wegovy 2.4, famotidine, and hydroxyzine.    Reason for visit: JANINE ROMEROF

## 2024-07-05 DIAGNOSIS — L50.5 CHOLINERGIC URTICARIA: ICD-10-CM

## 2024-07-06 RX ORDER — HYDROXYZINE PAMOATE 50 MG/1
50 CAPSULE ORAL 3 TIMES DAILY PRN
Qty: 90 CAPSULE | Refills: 1 | Status: SHIPPED | OUTPATIENT
Start: 2024-07-06

## 2024-07-23 ENCOUNTER — TELEPHONE (OUTPATIENT)
Dept: ENDOCRINOLOGY | Facility: CLINIC | Age: 19
End: 2024-07-23

## 2024-07-23 NOTE — TELEPHONE ENCOUNTER
Patient needs to be rescheduled for their virtual visit due to Reason for Reschedule: No-Show    Scheduling team, please refer to service line late cancellation/no-show policies and reach out to patient at a later date for rescheduling.    Appointment mode: Video  Provider: Montserrat Nazario RD    Noshowed initial visit right before RD visit, pt needs to have initial visit before seeing RD.

## 2024-07-23 NOTE — TELEPHONE ENCOUNTER
Patient needs to be rescheduled for their virtual visit due to Reason for Reschedule: Out-of-State    Scheduling team, please refer to service line late cancellation/no-show policies and reach out to patient at a later date for rescheduling.    Appointment mode: Video  Provider: Montserrat Nazario RD

## 2024-07-24 ENCOUNTER — PATIENT OUTREACH (OUTPATIENT)
Dept: CARE COORDINATION | Facility: CLINIC | Age: 19
End: 2024-07-24
Payer: COMMERCIAL

## 2024-07-24 ENCOUNTER — TELEPHONE (OUTPATIENT)
Dept: ENDOCRINOLOGY | Facility: CLINIC | Age: 19
End: 2024-07-24
Payer: COMMERCIAL

## 2024-07-24 NOTE — TELEPHONE ENCOUNTER
Prior Authorization Approval    Medication: WEGOVY 2.4 MG/0.75ML SC SOAJ  Authorization Effective Date: 7/24/2024  Authorization Expiration Date: 7/24/2025  Approved Dose/Quantity: 3 ml per 28 days  Reference #: [id number]   Insurance Company: [employer] - Phone [phone] Fax [phone]  Expected CoPay: $    CoPay Card Available:      Financial Assistance Needed: No  Which Pharmacy is filling the prescription: [id number] - [place], [place] - [address]  Pharmacy Notified: No - renewal only  Patient Notified: No - renewal only            Thank you,     Jacob Pittman [facility]  Pharmacy Clinic [facility] [email or web address]   Phone: [phone]  Fax: [phone]

## 2024-07-24 NOTE — PROGRESS NOTES
Clinic Care Coordination Contact  Community Health Worker Follow Up    Care Gaps:     Health Maintenance Due   Topic Date Due    ASTHMA ACTION PLAN  Never done    Pneumococcal Vaccine: Pediatrics (0 to 5 Years) and At-Risk Patients (6 to 64 Years) (1 of 2 - PCV) Never done    HEPATITIS B IMMUNIZATION (1 of 3 - 19+ 3-dose series) Never done    COVID-19 Vaccine (1 - 2023-24 season) Never done    HPV IMMUNIZATION (3 - Male 3-dose series) 06/28/2024       Care Gaps Last addressed on 7/24/2024    Care Plan:   Care Plan: General Completed 7/24/2024      Problem: HP GENERAL PROBLEM  Resolved 7/24/2024      Long-Range Goal: I would like to work with the Bariatric Clinic to assist me with weight loss.  Completed 7/24/2024      Start Date: 6/28/2023 Expected End Date: 2/28/2024    This Visit's Progress: 100% Recent Progress: 80%    Priority: High    Note:     Barriers: elevated BMI  Strengths: strong advocate for himself.  Patient expressed understanding of goal: yes  Action steps to achieve this goal:  1. I understand Dr. Louis has sent  referral to the Bariatric Clinic on my behalf.   2. I understand a representative from the Bariatric Clinic will contact me directly to scheduled an intake appointment.   3. I will report progress towards this goal at scheduled outreach telephone calls from my Care Coordination team.      Discussed on 6/24/24  Discussed 6/21- chw scheduled follow up call on 6/24/24 to address patient's questions, next weight loss clinic appt is scheduled 7/23/2024  Discussed- doing well, looking for prescribed scale  Discussed, next appt is 5/7  Discussed 3/15- Next appt scheduled 4/1/2024  Discussed on 2/16/24  Discussed with sister 3/7 next appt  Discussed on 1/18/24 11/22 discussed, next PCP appt 11/28, next weight mgmt visit 1/12 and 1/16/24  Discussed - completed virtual visit  Discussed on 8/2/23                              Intervention and Education during outreach: Supportive Listening- CHW will  send message to CC RN and PCP regarding WEGOVY prescription.    CHW spoke to [facility] today for monthly CC outreach call, patient states the moved to [place] went perfect. He informed CHW that he picked up name stephen yesterday and [place]  Patient has not established care with new provider at this time, he needs to wait to apply for health insurance until he has been a resident of [place] for 60 days.    Jose Daniel did bring 3 month prescription for Wegovy to [facility]  [address] [place] [address]  [phone]    He states that the pharmacy only filled it for 1 month and took prescription- He is not showing any refills on his guilherme. Can we confirm the pharmacy has the remaining refills? CHW will send message to PCP and CC RN on patient's behalf.    CHW and Jose Daniel completed current CC goal, as he has now moved to [place], next outreach call planned for next month. Dr Louis will be able to fill prescriptions until September. CHW will follow up then graduate patient when appropriate.    CHW Plan: Continue with monthly outreach call to discuss, support and update CC goal progression    Next CHW outreach call: 8/20/2024    Siri EVANS  Community Health Worker  [facility] Care Coordination  [facility], [email or web address]  [email or web address]  Office: [phone]

## 2024-07-24 NOTE — TELEPHONE ENCOUNTER
I am unable to confirm if the pharmacy has the refills, but will write for up to two more additional months.     NACHO AGUILAR, DO

## 2024-07-25 NOTE — TELEPHONE ENCOUNTER
Notified patient- patient has 2 more weeks of medication- will reach out if [facility] unable to refill at that time

## 2024-08-27 ENCOUNTER — PATIENT OUTREACH (OUTPATIENT)
Dept: CARE COORDINATION | Facility: CLINIC | Age: 19
End: 2024-08-27
Payer: COMMERCIAL

## 2024-08-28 ENCOUNTER — PATIENT OUTREACH (OUTPATIENT)
Dept: CARE COORDINATION | Facility: CLINIC | Age: 19
End: 2024-08-28

## 2024-08-28 NOTE — PROGRESS NOTES
Clinic Care Coordination Contact  Community Health Worker Follow Up    Care Gaps:     Health Maintenance Due   Topic Date Due    ASTHMA ACTION PLAN  Never done    Pneumococcal Vaccine: Pediatrics (0 to 5 Years) and At-Risk Patients (6 to 64 Years) (1 of 2 - PCV) Never done    HEPATITIS B IMMUNIZATION (1 of 3 - 19+ 3-dose series) Never done    COVID-19 Vaccine (1 - 2023-24 season) Never done    HPV IMMUNIZATION (3 - Male 3-dose series) 06/28/2024    ASTHMA CONTROL TEST  09/06/2024    YEARLY PREVENTIVE VISIT  09/26/2024       Care Gaps Last addressed on 8/28/2024    Care Plan:       Intervention and Education during outreach: Supportive Listening, CHW scheduled follow up CC RN call to discuss medication questions/concerns.    CHW spoke to [facility] today for CC follow up call, patient moved to [place] and is now settled in at her place and job.  He is having issues getting his Wegovy prescription filled in [place].  CHW did assist patient today with cancelling an in person visit with [facility]las (6 month follow up) patient will look into scheduling virtual visit due to living out of state    CHW Plan: Continue with monthly outreach call to discuss, support and update CC goal progression    Next CHW outreach call: 9/27/2024    Siri EVANS  Community Health Worker  [facility]  Clinic Care Coordination  [email or web address]  [email or web address]  Office: [phone]

## 2024-08-29 ENCOUNTER — PATIENT OUTREACH (OUTPATIENT)
Dept: NURSING | Facility: CLINIC | Age: 19
End: 2024-08-29
Payer: COMMERCIAL

## 2024-09-02 NOTE — PROGRESS NOTES
Clinic Care Coordination Contact    Situation: Patient chart reviewed by care coordinator.    Background: Patient was scheduled with CCC RN today regarding issues with getting his Wegovy.     Assessment: CCC RN spoke with patient today per request. Writer found that his insurance was active and reported this to patient. Patient sent request to [facility] pharmacy to see if it would be covered by his insurance. Patient informed writer he received a message back from [employer] his Wegovy would be covered. Patient stated he would  medication today.   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 He stated he would contact HR at his job and discuss enrolling in an insurance plan.     Plan/Recommendations: Patient was dis-enrolled in from Care Coordination as he is no longer seeing a PCP through [facility].     David Myhre, RN   CCC RN

## 2024-09-10 ENCOUNTER — PATIENT OUTREACH (OUTPATIENT)
Dept: CARE COORDINATION | Facility: CLINIC | Age: 19
End: 2024-09-10
Payer: COMMERCIAL

## 2024-11-10 ENCOUNTER — HEALTH MAINTENANCE LETTER (OUTPATIENT)
Age: 19
End: 2024-11-10

## 2024-11-15 DIAGNOSIS — N18.1 CKD STAGE G1/A1, GFR > 90 AND ALBUMIN CREATININE RATIO <30 MG/G: Primary | ICD-10-CM

## 2025-03-25 ENCOUNTER — PATIENT OUTREACH (OUTPATIENT)
Dept: CARE COORDINATION | Facility: CLINIC | Age: 20
End: 2025-03-25
Payer: COMMERCIAL

## 2025-08-01 ENCOUNTER — TELEPHONE (OUTPATIENT)
Dept: ENDOCRINOLOGY | Facility: CLINIC | Age: 20
End: 2025-08-01
Payer: COMMERCIAL